# Patient Record
Sex: FEMALE | Race: WHITE | NOT HISPANIC OR LATINO | Employment: FULL TIME | URBAN - METROPOLITAN AREA
[De-identification: names, ages, dates, MRNs, and addresses within clinical notes are randomized per-mention and may not be internally consistent; named-entity substitution may affect disease eponyms.]

---

## 2017-03-08 ENCOUNTER — CONSULT (OUTPATIENT)
Dept: ONCOLOGY | Facility: CLINIC | Age: 42
End: 2017-03-08

## 2017-03-08 VITALS
SYSTOLIC BLOOD PRESSURE: 125 MMHG | HEART RATE: 72 BPM | WEIGHT: 198 LBS | DIASTOLIC BLOOD PRESSURE: 56 MMHG | BODY MASS INDEX: 32.99 KG/M2 | RESPIRATION RATE: 16 BRPM | HEIGHT: 65 IN | TEMPERATURE: 98.1 F

## 2017-03-08 DIAGNOSIS — C50.912 MALIGNANT NEOPLASM OF LEFT FEMALE BREAST, UNSPECIFIED SITE OF BREAST: Primary | ICD-10-CM

## 2017-03-08 PROCEDURE — 99204 OFFICE O/P NEW MOD 45 MIN: CPT | Performed by: INTERNAL MEDICINE

## 2017-03-08 NOTE — PROGRESS NOTES
Subjective     PROBLEM LIST:  1. [id number] invasive ductal carcinoma of the left breast, ER positive CO positive HER-2 negative  A) the patient presented with skin dimpling under the left breast.  A needle localized biopsy was done on 2017.  Pathology showed a 2.2 cm invasive ductal carcinoma.  There were focally positive margins.    CHIEF COMPLAINT: Newly diagnosed breast cancer      HISTORY OF PRESENT ILLNESS:  The patient is a 41 y.o. year old female, referred for evaluation of newly diagnosed breast cancer.  She reports that she noticed a left rib old appearance of the skin under her left breast.  She had had a prior mammogram at 36.  She contacted her doctor who set her up for a repeat mammogram.  This did show an abnormality in the left breast.  An initial biopsy showed a benign lesion but because of concern about the mammogram appearance she was referred for a surgical biopsy.  This was done by Dr. Bauer on 2017.  Her pathology showed a 2.2 cm ER-positive tumor with focally positive margins.    She currently says she's feeling well.  She has a little soreness in the breast but no other concerns.  She is trying to decide if she wants to have a lumpectomy or mastectomy for her cancer treatment.  After talking to friends and family members she is requesting referral to Dr. HARE for her breast cancer surgery.    REVIEW OF SYSTEMS:  A 14 point review of systems was performed and is negative except as noted above.    No past medical history on file.    GYN History: .  Menarche age 14.  Last menstrual period 2014.  She had an endometrial ablation in 2014.    No current outpatient prescriptions on file prior to visit.     No current facility-administered medications on file prior to visit.        No Known Allergies    Past Surgical History   Procedure Laterality Date   • Dilatation and curettage     • Tonsillectomy     • Breast biopsy Left 2017   • Breast lumpectomy Left 2018  "  • Hysteroscopy endometrial ablation tubal sterilization  07/2014   • Lipoma excision  07/2014     Right rib cage       Social History     Social History   • Marital status:      Spouse name: N/A   • Number of children: N/A   • Years of education: N/A     Social History Main Topics   • Smoking status: Former Smoker     Packs/day: 1.00     Years: 10.00     Types: Cigarettes     Quit date: 2004   • Smokeless tobacco: Never Used   • Alcohol use Yes      Comment: occ   • Drug use: No   • Sexual activity: Not Asked     Other Topics Concern   • None     Social History Narrative   • None    she is  and lives with her .  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    Family History   Problem Relation Age of Onset   • Throat cancer Maternal Uncle     no family history of breast or ovarian cancer    Objective     Visit Vitals   • /56   • Pulse 72   • Temp 98.1 °F (36.7 °C) (Temporal Artery )   • Resp 16   • Ht 64.5\" (163.8 cm)   • Wt 198 lb (89.8 kg)   • BMI 33.46 kg/m2     Performance Status: 0  General: well appearing female in no acute distress  Neuro: alert and oriented  HEENT: sclera anicteric, oropharynx clear  Lymphatics: no cervical, supraclavicular, or axillary adenopathy  Rest: There is a incision in the inner lower quadrant of the left breast.  Is healing well.  There is no significant erythema or induration  Cardiovascular: regular rate and rhythm, no murmurs  Lungs: clear to auscultation bilaterally  Abdomen: soft, nontender, nondistended.  No palpable organomegaly  Extremeties: no lower extremity edema  Skin: no rashes, lesions, bruising, or petechiae  Psych: mood and affect appropriate            Assessment/Plan     Mary Ambrosio is a 41 y.o. year old female with new diagnosed stage II ER positive breast cancer.  At this time we do not have any information on lymph nodes and additional surgery will be needed to excise the positive margins and do a sentinel lymph node biopsy.  This " was all explained to her previously by Dr. Bauer.  The patient has decided that she would like to see Dr. Ramsey Arellano for additional surgery, and in particular because she is considering bilateral mastectomy with reconstruction.    We spent some time discussing her surgical options.  I explained to her that in terms of her survival and breast cancer outcome there was no benefit to mastectomy versus lumpectomy followed by radiation.  Many patients will choose mastectomy based on a desire to avoid further mammography and for peace of mind.  We can have a more detailed discussion about adjuvant therapy options once we know the status of her lymph node.  On exam she does not have any palpable adenopathy.  Assuming that her axillary nodes are negative, this would be a stage IIa breast cancer and she would be a good candidate for Oncotype testing.  We did discuss that adjuvant hormonal therapy will be recommended, most likely with tamoxifen.    I will will plan to see her back in mid April.  This appointment can be moved if needed based on the timing of her surgery.           Nancy Caruso MD    3/8/2017

## 2017-03-14 ENCOUNTER — CLINICAL SUPPORT (OUTPATIENT)
Dept: GENETICS | Facility: HOSPITAL | Age: 42
End: 2017-03-14

## 2017-03-14 DIAGNOSIS — C50.919 MALIGNANT NEOPLASM OF FEMALE BREAST, UNSPECIFIED LATERALITY, UNSPECIFIED SITE OF BREAST: Primary | ICD-10-CM

## 2017-03-14 PROCEDURE — 96040: CPT | Performed by: GENETIC COUNSELOR, MS

## 2017-03-14 NOTE — PROGRESS NOTES
Mary Ambrosio is a 41-year old female who was seen for genetic counseling due to a personal history of breast cancer.  Ms. Ambrosio was recently diagnosed with an ER/VA+ invasive ductal breast cancer at age 41.  Ms. Ambrosio retains her uterus and ovaries.  Ms. Ambrosio was interested in discussing her risk for a hereditary cancer syndrome, and decided to pursue genetic testing.   Ms. Ambrosio opted to pursue comprehensive testing via the BreastNext panel ordered through [facility] which includes 17 genes that are known to increase the risk of breast cancer (GILDA, BARD1, BRCA1, BRCA2, BRIP1, CDH1, CHEK2, NBN, NF1, MRE11A, MUTYH, PALB2, PTEN, RAD50, RAD51C, RAD51D, TP53). Results are expected in 2-3 weeks.    FAMILY HISTORY:  Mat. Great-aunt: Pancreatic cancer  Mat. Great-uncle: Prostate cancer, 50s     RISK ASSESSMENT:  Ms. Ambrosio’s personal history of early onset breast cancer raises the question of a hereditary cancer syndrome.  We discussed BRCA1/2 testing as well as the option of pursuing a panel that would test for other genes known to impact breast cancer risk in addition to BRCA1/2.   Ms. Ambrosio clearly meets NCCN guidelines criteria for BRCA1/2 testing based on her age at diagnosis. Based on the family history, the likelihood of a BRCA1/2 mutation is approximately 5%.  This risk assessment is based on the family history information provided at the time of the appointment.  The assessment could change in the future should new information be obtained.    GENETIC COUNSELING (40 minutes): We reviewed the family history information in detail.  Cases of breast cancer follow three general patterns: sporadic, familial, and hereditary.  While most cancer is sporadic, some cases appear to occur in family clusters.  These cases are said to be familial and account for 10-20% of breast cancer cases.  Familial cases may be due to a combination of shared genes and environmental factors among family members.  In even fewer  families, the cancer is said to be inherited, and the genes responsible for the cancer are known.      Family histories typical of hereditary cancer syndromes usually include multiple first- and second-degree relatives diagnosed with cancer types that define a syndrome.  These cases tend to be diagnosed at younger-than-expected ages and can be bilateral or multifocal.  The cancer in these families follows an autosomal dominant inheritance pattern, which indicates the likely presence of a mutation in a cancer susceptibility gene.  Children and siblings of an individual believed to carry this mutation have a 50% chance of inheriting that mutation, thereby inheriting the increased risk to develop cancer.  These mutations can be passed down from the maternal or the paternal lineage.    Hereditary breast cancer accounts for 5-10% of all cases of breast cancer.  A significant proportion of hereditary breast cancer can be attributed to mutations in the BRCA1 and BRCA2 genes.  Mutations in these genes confer an increased risk for breast cancer, ovarian cancer, male breast cancer, prostate cancer and pancreatic cancer.  Women with a BRCA1 or BRCA2 mutation who have already been diagnosed with breast cancer have a 40-60% lifetime risk of a second breast cancer.     There are other genes that are known to be associated with an increased risk for breast cancer.  Some of these genes have well defined cancer risks and established management guidelines.  Other genes that can be tested for have been more recently described, and there may be less data regarding the risks and therefore may not have established  management guidelines.  Based on Ms. Ambrosio’s desire to get as much information as possible regarding her personal risks and potential risks for her family, she opted to pursue testing through a panel that would look at several other genes known to increase the risk for breast cancer.    GENETIC TESTING:  The risks, benefits  and limitations of genetic testing and implications for clinical management following testing were reviewed.  DNA test results can influence decisions regarding screening, prevention and surgical management.  Genetic testing can have significant psychological implications for both individuals and families.  Also discussed was the possibility of employment and insurance discrimination based on genetic test results and the laws in place to prevent this.    We discussed panel testing, which would involve testing for BRCA1/2 as well as several other genes at the same time (other genes include GILDA, BARD1, BRIP1, CDH1, CHEK2, NBN, NF1, MRE11A, MUTYH, PALB2, PTEN, RAD50, RAD51C, RAD51D, TP53). The benefits and limitations of genetic testing were discussed and Ms. Ambrosio decided to   pursue testing via the panel. The implications of a positive or negative test result were discussed. We discussed the possibility that, in some cases, genetic test results may be informative or may be ambiguous due to the identification of a genetic variant. These variants may or may not be associated with an increased cancer risk.  Given her personal history, a negative test result would not eliminate all breast cancer risk to her relatives, although the risk would not be as high as it would with positive genetic testing.        PLAN: Results are expected in 2-3 weeks.  Ms. Ambrosio is welcome to contact us in the meantime at [phone] with any questions she may have.      Jessica Sloan MS, [facility] [place]  Certified Genetic Counselor

## 2017-03-16 ENCOUNTER — DOCUMENTATION (OUTPATIENT)
Dept: OTHER | Facility: HOSPITAL | Age: 42
End: 2017-03-16

## 2017-03-16 NOTE — PROGRESS NOTES
Patient called to ask about oncotype - she wanted to know if it needed to be done before surgery - I explained that it would be ordered by Medical Oncologist if needed after we have the final pathology report - patient verbalized understanding.

## 2017-03-17 ENCOUNTER — DOCUMENTATION (OUTPATIENT)
Dept: OTHER | Facility: HOSPITAL | Age: 42
End: 2017-03-17

## 2017-03-17 NOTE — PROGRESS NOTES
I saw patient with Dr. HARE on 3/15/17. Patient' s  Guido was present. Stage IIA IG IDC. ER/SC positive and HER negative. The patient saw Dr Caruso prior to her surgical consultation.   She has an appointment to see genetics on 3/14/17. Arm measurement completed and given to Sendy for   EMR. Patient consented for decision study. Tumor at outside facility. Educational and supportive materials given and reviewed.

## 2017-03-31 ENCOUNTER — DOCUMENTATION (OUTPATIENT)
Dept: GENETICS | Facility: HOSPITAL | Age: 42
End: 2017-03-31

## 2017-04-11 ENCOUNTER — APPOINTMENT (OUTPATIENT)
Dept: PREADMISSION TESTING | Facility: HOSPITAL | Age: 42
End: 2017-04-11

## 2017-04-11 VITALS — HEIGHT: 66 IN | WEIGHT: 200.84 LBS | BODY MASS INDEX: 32.28 KG/M2

## 2017-04-11 LAB
ALBUMIN SERPL-MCNC: 4.3 G/DL (ref 3.2–4.8)
ALBUMIN/GLOB SERPL: 1.4 G/DL (ref 1.5–2.5)
ALP SERPL-CCNC: 79 U/L (ref 25–100)
ALT SERPL W P-5'-P-CCNC: 31 U/L (ref 7–40)
ANION GAP SERPL CALCULATED.3IONS-SCNC: 3 MMOL/L (ref 3–11)
AST SERPL-CCNC: 29 U/L (ref 0–33)
BILIRUB SERPL-MCNC: 0.7 MG/DL (ref 0.3–1.2)
BUN BLD-MCNC: 12 MG/DL (ref 9–23)
BUN/CREAT SERPL: 17.1 (ref 7–25)
CALCIUM SPEC-SCNC: 9.7 MG/DL (ref 8.7–10.4)
CHLORIDE SERPL-SCNC: 104 MMOL/L (ref 99–109)
CO2 SERPL-SCNC: 32 MMOL/L (ref 20–31)
CREAT BLD-MCNC: 0.7 MG/DL (ref 0.6–1.3)
DEPRECATED RDW RBC AUTO: 43 FL (ref 37–54)
ERYTHROCYTE [DISTWIDTH] IN BLOOD BY AUTOMATED COUNT: 13.3 % (ref 11.3–14.5)
GFR SERPL CREATININE-BSD FRML MDRD: 93 ML/MIN/1.73
GLOBULIN UR ELPH-MCNC: 3 GM/DL
GLUCOSE BLD-MCNC: 94 MG/DL (ref 70–100)
HCT VFR BLD AUTO: 43.2 % (ref 34.5–44)
HGB BLD-MCNC: 14 G/DL (ref 11.5–15.5)
MCH RBC QN AUTO: 28.4 PG (ref 27–31)
MCHC RBC AUTO-ENTMCNC: 32.4 G/DL (ref 32–36)
MCV RBC AUTO: 87.6 FL (ref 80–99)
PLATELET # BLD AUTO: 270 10*3/MM3 (ref 150–450)
PMV BLD AUTO: 9.8 FL (ref 6–12)
POTASSIUM BLD-SCNC: 3.9 MMOL/L (ref 3.5–5.5)
PROT SERPL-MCNC: 7.3 G/DL (ref 5.7–8.2)
RBC # BLD AUTO: 4.93 10*6/MM3 (ref 3.89–5.14)
SODIUM BLD-SCNC: 139 MMOL/L (ref 132–146)
WBC NRBC COR # BLD: 11.47 10*3/MM3 (ref 3.5–10.8)

## 2017-04-11 NOTE — PAT
Gaurav sy measurements:  Length: 21  Width: 15    Contacted doctors office re: orders from Dr. Burnett.  No orders were faxed during patient's visit in [facility].  Please have patient sign consent when orders are received from Dr. Burnett

## 2017-04-12 ENCOUNTER — HOSPITAL ENCOUNTER (OUTPATIENT)
Facility: HOSPITAL | Age: 42
Setting detail: OBSERVATION
Discharge: HOME | End: 2017-04-13
Attending: SURGERY | Admitting: SURGERY

## 2017-04-12 ENCOUNTER — ANESTHESIA EVENT (OUTPATIENT)
Dept: PERIOP | Facility: HOSPITAL | Age: 42
End: 2017-04-12

## 2017-04-12 ENCOUNTER — ANESTHESIA (OUTPATIENT)
Dept: PERIOP | Facility: HOSPITAL | Age: 42
End: 2017-04-12

## 2017-04-12 ENCOUNTER — HOSPITAL ENCOUNTER (OUTPATIENT)
Dept: NUCLEAR MEDICINE | Facility: HOSPITAL | Age: 42
Discharge: HOME | End: 2017-04-12

## 2017-04-12 DIAGNOSIS — C50.912 INVASIVE DUCTAL CARCINOMA OF LEFT BREAST (HCC): ICD-10-CM

## 2017-04-12 DIAGNOSIS — C50.919 BREAST CANCER (HCC): ICD-10-CM

## 2017-04-12 LAB
B-HCG UR QL: NEGATIVE
INTERNAL NEGATIVE CONTROL: NORMAL
INTERNAL POSITIVE CONTROL: REACTIVE
Lab: NORMAL

## 2017-04-12 PROCEDURE — 25010000003 CEFAZOLIN IN DEXTROSE 2-4 GM/100ML-% SOLUTION: Performed by: SURGERY

## 2017-04-12 PROCEDURE — 88331 PATH CONSLTJ SURG 1 BLK 1SPC: CPT | Performed by: SURGERY

## 2017-04-12 PROCEDURE — 38792 RA TRACER ID OF SENTINL NODE: CPT

## 2017-04-12 PROCEDURE — 25010000002 NEOSTIGMINE PER 0.5 MG: Performed by: NURSE ANESTHETIST, CERTIFIED REGISTERED

## 2017-04-12 PROCEDURE — C1789 PROSTHESIS, BREAST, IMP: HCPCS | Performed by: SURGERY

## 2017-04-12 PROCEDURE — 88307 TISSUE EXAM BY PATHOLOGIST: CPT | Performed by: SURGERY

## 2017-04-12 PROCEDURE — 25010000002 ONDANSETRON PER 1 MG: Performed by: NURSE ANESTHETIST, CERTIFIED REGISTERED

## 2017-04-12 PROCEDURE — 25010000002 PROPOFOL 10 MG/ML EMULSION: Performed by: NURSE ANESTHETIST, CERTIFIED REGISTERED

## 2017-04-12 PROCEDURE — 25010000002 DEXAMETHASONE SODIUM PHOSPHATE 10 MG/ML SOLUTION: Performed by: NURSE ANESTHETIST, CERTIFIED REGISTERED

## 2017-04-12 PROCEDURE — 25010000002 DEXAMETHASONE PER 1 MG: Performed by: NURSE ANESTHETIST, CERTIFIED REGISTERED

## 2017-04-12 PROCEDURE — G0378 HOSPITAL OBSERVATION PER HR: HCPCS

## 2017-04-12 PROCEDURE — A9541 TC99M SULFUR COLLOID: HCPCS | Performed by: SURGERY

## 2017-04-12 PROCEDURE — 25010000002 BUPRENORPHINE PER 0.1 MG: Performed by: NURSE ANESTHETIST, CERTIFIED REGISTERED

## 2017-04-12 PROCEDURE — 0 TECHNETIUM FILTERED SULFUR COLLOID: Performed by: SURGERY

## 2017-04-12 DEVICE — IMPLANTABLE DEVICE: Type: IMPLANTABLE DEVICE | Status: FUNCTIONAL

## 2017-04-12 RX ORDER — MAGNESIUM HYDROXIDE 1200 MG/15ML
LIQUID ORAL AS NEEDED
Status: DISCONTINUED | OUTPATIENT
Start: 2017-04-12 | End: 2017-04-12 | Stop reason: HOSPADM

## 2017-04-12 RX ORDER — ONDANSETRON 2 MG/ML
4 INJECTION INTRAMUSCULAR; INTRAVENOUS ONCE AS NEEDED
Status: DISCONTINUED | OUTPATIENT
Start: 2017-04-12 | End: 2017-04-12 | Stop reason: HOSPADM

## 2017-04-12 RX ORDER — PROMETHAZINE HYDROCHLORIDE 25 MG/ML
6.25 INJECTION, SOLUTION INTRAMUSCULAR; INTRAVENOUS EVERY 6 HOURS PRN
Status: DISCONTINUED | OUTPATIENT
Start: 2017-04-12 | End: 2017-04-13 | Stop reason: HOSPADM

## 2017-04-12 RX ORDER — PROMETHAZINE HYDROCHLORIDE 25 MG/ML
6.25 INJECTION, SOLUTION INTRAMUSCULAR; INTRAVENOUS ONCE AS NEEDED
Status: DISCONTINUED | OUTPATIENT
Start: 2017-04-12 | End: 2017-04-12 | Stop reason: HOSPADM

## 2017-04-12 RX ORDER — ACETAMINOPHEN 500 MG
1000 TABLET ORAL ONCE
Status: COMPLETED | OUTPATIENT
Start: 2017-04-12 | End: 2017-04-12

## 2017-04-12 RX ORDER — HYDROMORPHONE HYDROCHLORIDE 1 MG/ML
0.5 INJECTION, SOLUTION INTRAMUSCULAR; INTRAVENOUS; SUBCUTANEOUS
Status: DISCONTINUED | OUTPATIENT
Start: 2017-04-12 | End: 2017-04-12 | Stop reason: HOSPADM

## 2017-04-12 RX ORDER — SODIUM CHLORIDE, SODIUM LACTATE, POTASSIUM CHLORIDE, CALCIUM CHLORIDE 600; 310; 30; 20 MG/100ML; MG/100ML; MG/100ML; MG/100ML
100 INJECTION, SOLUTION INTRAVENOUS CONTINUOUS
Status: DISCONTINUED | OUTPATIENT
Start: 2017-04-12 | End: 2017-04-12 | Stop reason: SDUPTHER

## 2017-04-12 RX ORDER — BUPIVACAINE HYDROCHLORIDE 2.5 MG/ML
INJECTION, SOLUTION EPIDURAL; INFILTRATION; INTRACAUDAL AS NEEDED
Status: DISCONTINUED | OUTPATIENT
Start: 2017-04-12 | End: 2017-04-12 | Stop reason: SURG

## 2017-04-12 RX ORDER — CEFAZOLIN SODIUM 2 G/100ML
2 INJECTION, SOLUTION INTRAVENOUS EVERY 8 HOURS
Status: DISCONTINUED | OUTPATIENT
Start: 2017-04-12 | End: 2017-04-13 | Stop reason: HOSPADM

## 2017-04-12 RX ORDER — ATRACURIUM BESYLATE 10 MG/ML
INJECTION, SOLUTION INTRAVENOUS AS NEEDED
Status: DISCONTINUED | OUTPATIENT
Start: 2017-04-12 | End: 2017-04-12 | Stop reason: SURG

## 2017-04-12 RX ORDER — FENTANYL CITRATE 50 UG/ML
50 INJECTION, SOLUTION INTRAMUSCULAR; INTRAVENOUS
Status: DISCONTINUED | OUTPATIENT
Start: 2017-04-12 | End: 2017-04-12 | Stop reason: HOSPADM

## 2017-04-12 RX ORDER — SCOLOPAMINE TRANSDERMAL SYSTEM 1 MG/1
1 PATCH, EXTENDED RELEASE TRANSDERMAL ONCE
Status: DISCONTINUED | OUTPATIENT
Start: 2017-04-12 | End: 2017-04-12

## 2017-04-12 RX ORDER — FAMOTIDINE 20 MG/1
20 TABLET, FILM COATED ORAL ONCE
Status: COMPLETED | OUTPATIENT
Start: 2017-04-12 | End: 2017-04-12

## 2017-04-12 RX ORDER — SODIUM CHLORIDE 9 MG/ML
INJECTION, SOLUTION INTRAVENOUS CONTINUOUS PRN
Status: DISCONTINUED | OUTPATIENT
Start: 2017-04-12 | End: 2017-04-12 | Stop reason: HOSPADM

## 2017-04-12 RX ORDER — PROMETHAZINE HYDROCHLORIDE 25 MG/1
25 TABLET ORAL ONCE AS NEEDED
Status: DISCONTINUED | OUTPATIENT
Start: 2017-04-12 | End: 2017-04-12 | Stop reason: HOSPADM

## 2017-04-12 RX ORDER — PROMETHAZINE HYDROCHLORIDE 25 MG/1
25 SUPPOSITORY RECTAL ONCE AS NEEDED
Status: DISCONTINUED | OUTPATIENT
Start: 2017-04-12 | End: 2017-04-12 | Stop reason: HOSPADM

## 2017-04-12 RX ORDER — DEXAMETHASONE SODIUM PHOSPHATE 10 MG/ML
INJECTION, SOLUTION INTRAMUSCULAR; INTRAVENOUS AS NEEDED
Status: DISCONTINUED | OUTPATIENT
Start: 2017-04-12 | End: 2017-04-12 | Stop reason: SURG

## 2017-04-12 RX ORDER — SODIUM CHLORIDE, SODIUM LACTATE, POTASSIUM CHLORIDE, CALCIUM CHLORIDE 600; 310; 30; 20 MG/100ML; MG/100ML; MG/100ML; MG/100ML
9 INJECTION, SOLUTION INTRAVENOUS CONTINUOUS
Status: DISCONTINUED | OUTPATIENT
Start: 2017-04-12 | End: 2017-04-12 | Stop reason: SDUPTHER

## 2017-04-12 RX ORDER — ONDANSETRON 2 MG/ML
INJECTION INTRAMUSCULAR; INTRAVENOUS AS NEEDED
Status: DISCONTINUED | OUTPATIENT
Start: 2017-04-12 | End: 2017-04-12 | Stop reason: SURG

## 2017-04-12 RX ORDER — PREGABALIN 75 MG/1
75 CAPSULE ORAL ONCE
Status: COMPLETED | OUTPATIENT
Start: 2017-04-12 | End: 2017-04-12

## 2017-04-12 RX ORDER — SODIUM CHLORIDE 0.9 % (FLUSH) 0.9 %
1-10 SYRINGE (ML) INJECTION AS NEEDED
Status: DISCONTINUED | OUTPATIENT
Start: 2017-04-12 | End: 2017-04-12 | Stop reason: HOSPADM

## 2017-04-12 RX ORDER — DEXAMETHASONE SODIUM PHOSPHATE 4 MG/ML
INJECTION, SOLUTION INTRA-ARTICULAR; INTRALESIONAL; INTRAMUSCULAR; INTRAVENOUS; SOFT TISSUE AS NEEDED
Status: DISCONTINUED | OUTPATIENT
Start: 2017-04-12 | End: 2017-04-12 | Stop reason: SURG

## 2017-04-12 RX ORDER — CELECOXIB 200 MG/1
200 CAPSULE ORAL EVERY 12 HOURS
Status: DISCONTINUED | OUTPATIENT
Start: 2017-04-12 | End: 2017-04-13 | Stop reason: HOSPADM

## 2017-04-12 RX ORDER — ONDANSETRON 2 MG/ML
4 INJECTION INTRAMUSCULAR; INTRAVENOUS EVERY 6 HOURS PRN
Status: DISCONTINUED | OUTPATIENT
Start: 2017-04-12 | End: 2017-04-13 | Stop reason: HOSPADM

## 2017-04-12 RX ORDER — LIDOCAINE HYDROCHLORIDE 10 MG/ML
INJECTION, SOLUTION INFILTRATION; PERINEURAL AS NEEDED
Status: DISCONTINUED | OUTPATIENT
Start: 2017-04-12 | End: 2017-04-12 | Stop reason: SURG

## 2017-04-12 RX ORDER — LIDOCAINE HYDROCHLORIDE 10 MG/ML
1 INJECTION, SOLUTION EPIDURAL; INFILTRATION; INTRACAUDAL; PERINEURAL ONCE
Status: COMPLETED | OUTPATIENT
Start: 2017-04-12 | End: 2017-04-12

## 2017-04-12 RX ORDER — CARISOPRODOL 350 MG/1
350 TABLET ORAL EVERY 8 HOURS PRN
Status: DISCONTINUED | OUTPATIENT
Start: 2017-04-12 | End: 2017-04-13 | Stop reason: HOSPADM

## 2017-04-12 RX ORDER — ACETAMINOPHEN 500 MG
1000 TABLET ORAL EVERY 6 HOURS
Status: DISCONTINUED | OUTPATIENT
Start: 2017-04-13 | End: 2017-04-13 | Stop reason: HOSPADM

## 2017-04-12 RX ORDER — MEPERIDINE HYDROCHLORIDE 25 MG/ML
12.5 INJECTION INTRAMUSCULAR; INTRAVENOUS; SUBCUTANEOUS
Status: DISCONTINUED | OUTPATIENT
Start: 2017-04-12 | End: 2017-04-12 | Stop reason: HOSPADM

## 2017-04-12 RX ORDER — GLYCOPYRROLATE 0.2 MG/ML
INJECTION INTRAMUSCULAR; INTRAVENOUS AS NEEDED
Status: DISCONTINUED | OUTPATIENT
Start: 2017-04-12 | End: 2017-04-12 | Stop reason: SURG

## 2017-04-12 RX ORDER — DIPHENHYDRAMINE HYDROCHLORIDE 50 MG/ML
12.5 INJECTION INTRAMUSCULAR; INTRAVENOUS EVERY 6 HOURS PRN
Status: DISCONTINUED | OUTPATIENT
Start: 2017-04-12 | End: 2017-04-13 | Stop reason: HOSPADM

## 2017-04-12 RX ORDER — BUPRENORPHINE HYDROCHLORIDE 0.32 MG/ML
INJECTION INTRAMUSCULAR; INTRAVENOUS AS NEEDED
Status: DISCONTINUED | OUTPATIENT
Start: 2017-04-12 | End: 2017-04-12 | Stop reason: SURG

## 2017-04-12 RX ORDER — OXYCODONE HYDROCHLORIDE 5 MG/1
10 TABLET ORAL EVERY 4 HOURS PRN
Status: DISCONTINUED | OUTPATIENT
Start: 2017-04-12 | End: 2017-04-13 | Stop reason: HOSPADM

## 2017-04-12 RX ORDER — PROPOFOL 10 MG/ML
VIAL (ML) INTRAVENOUS AS NEEDED
Status: DISCONTINUED | OUTPATIENT
Start: 2017-04-12 | End: 2017-04-12 | Stop reason: SURG

## 2017-04-12 RX ORDER — FAMOTIDINE 10 MG/ML
20 INJECTION, SOLUTION INTRAVENOUS ONCE
Status: CANCELLED | OUTPATIENT
Start: 2017-04-12 | End: 2017-04-12

## 2017-04-12 RX ORDER — CEFAZOLIN SODIUM 2 G/100ML
2 INJECTION, SOLUTION INTRAVENOUS ONCE
Status: COMPLETED | OUTPATIENT
Start: 2017-04-12 | End: 2017-04-12

## 2017-04-12 RX ORDER — CARISOPRODOL 350 MG/1
350 TABLET ORAL 3 TIMES DAILY PRN
Status: DISCONTINUED | OUTPATIENT
Start: 2017-04-12 | End: 2017-04-12 | Stop reason: SDUPTHER

## 2017-04-12 RX ORDER — CELECOXIB 200 MG/1
200 CAPSULE ORAL ONCE
Status: COMPLETED | OUTPATIENT
Start: 2017-04-12 | End: 2017-04-12

## 2017-04-12 RX ORDER — PROPOFOL 10 MG/ML
VIAL (ML) INTRAVENOUS CONTINUOUS PRN
Status: DISCONTINUED | OUTPATIENT
Start: 2017-04-12 | End: 2017-04-12 | Stop reason: SURG

## 2017-04-12 RX ORDER — SODIUM CHLORIDE 9 MG/ML
100 INJECTION, SOLUTION INTRAVENOUS CONTINUOUS
Status: DISCONTINUED | OUTPATIENT
Start: 2017-04-12 | End: 2017-04-13 | Stop reason: HOSPADM

## 2017-04-12 RX ADMIN — CARISOPRODOL 350 MG: 350 TABLET ORAL at 19:29

## 2017-04-12 RX ADMIN — ONDANSETRON 4 MG: 2 INJECTION INTRAMUSCULAR; INTRAVENOUS at 16:58

## 2017-04-12 RX ADMIN — ROBINUL 0.4 MG: 0.2 INJECTION INTRAMUSCULAR; INTRAVENOUS at 17:20

## 2017-04-12 RX ADMIN — BUPIVACAINE HYDROCHLORIDE 30 ML: 2.5 INJECTION, SOLUTION EPIDURAL; INFILTRATION; INTRACAUDAL; PERINEURAL at 13:35

## 2017-04-12 RX ADMIN — CELECOXIB 200 MG: 200 CAPSULE ORAL at 12:07

## 2017-04-12 RX ADMIN — SCOPALAMINE 1 PATCH: 1 PATCH, EXTENDED RELEASE TRANSDERMAL at 11:46

## 2017-04-12 RX ADMIN — DEXAMETHASONE SODIUM PHOSPHATE 2 MG: 10 INJECTION, SOLUTION INTRAMUSCULAR; INTRAVENOUS at 13:40

## 2017-04-12 RX ADMIN — FAMOTIDINE 20 MG: 20 TABLET ORAL at 11:46

## 2017-04-12 RX ADMIN — Medication 3 MG: at 17:20

## 2017-04-12 RX ADMIN — BUPRENORPHINE HYDROCHLORIDE 0.15 MG: 0.3 INJECTION INTRAMUSCULAR; INTRAVENOUS at 13:35

## 2017-04-12 RX ADMIN — ATRACURIUM BESYLATE 50 MG: 10 INJECTION, SOLUTION INTRAVENOUS at 13:32

## 2017-04-12 RX ADMIN — CEFAZOLIN SODIUM 2 G: 2 INJECTION, SOLUTION INTRAVENOUS at 13:26

## 2017-04-12 RX ADMIN — ATRACURIUM BESYLATE 20 MG: 10 INJECTION, SOLUTION INTRAVENOUS at 15:30

## 2017-04-12 RX ADMIN — ACETAMINOPHEN 1000 MG: 500 TABLET ORAL at 11:45

## 2017-04-12 RX ADMIN — DEXAMETHASONE SODIUM PHOSPHATE 8 MG: 4 INJECTION, SOLUTION INTRAMUSCULAR; INTRAVENOUS at 13:45

## 2017-04-12 RX ADMIN — SODIUM CHLORIDE 100 ML/HR: 9 INJECTION, SOLUTION INTRAVENOUS at 18:00

## 2017-04-12 RX ADMIN — Medication 1 DOSE: at 13:26

## 2017-04-12 RX ADMIN — BUPIVACAINE HYDROCHLORIDE 30 ML: 2.5 INJECTION, SOLUTION EPIDURAL; INFILTRATION; INTRACAUDAL; PERINEURAL at 13:40

## 2017-04-12 RX ADMIN — PREGABALIN 75 MG: 75 CAPSULE ORAL at 11:45

## 2017-04-12 RX ADMIN — LIDOCAINE HYDROCHLORIDE 50 MG: 10 INJECTION, SOLUTION INFILTRATION; PERINEURAL at 13:32

## 2017-04-12 RX ADMIN — DEXAMETHASONE SODIUM PHOSPHATE 2 MG: 10 INJECTION, SOLUTION INTRAMUSCULAR; INTRAVENOUS at 13:35

## 2017-04-12 RX ADMIN — CEFAZOLIN SODIUM 2 G: 2 INJECTION, SOLUTION INTRAVENOUS at 21:49

## 2017-04-12 RX ADMIN — LIDOCAINE HYDROCHLORIDE 1 ML: 10 INJECTION, SOLUTION EPIDURAL; INFILTRATION; INTRACAUDAL; PERINEURAL at 11:46

## 2017-04-12 RX ADMIN — SODIUM CHLORIDE, POTASSIUM CHLORIDE, SODIUM LACTATE AND CALCIUM CHLORIDE: 600; 310; 30; 20 INJECTION, SOLUTION INTRAVENOUS at 15:45

## 2017-04-12 RX ADMIN — CELECOXIB 200 MG: 200 CAPSULE ORAL at 21:49

## 2017-04-12 RX ADMIN — PROPOFOL 200 MCG/KG/MIN: 10 INJECTION, EMULSION INTRAVENOUS at 13:32

## 2017-04-12 RX ADMIN — PROPOFOL 200 MG: 10 INJECTION, EMULSION INTRAVENOUS at 13:32

## 2017-04-12 RX ADMIN — BUPRENORPHINE HYDROCHLORIDE 0.15 MG: 0.3 INJECTION INTRAMUSCULAR; INTRAVENOUS at 13:40

## 2017-04-12 RX ADMIN — OXYCODONE HYDROCHLORIDE 10 MG: 5 TABLET ORAL at 19:29

## 2017-04-12 RX ADMIN — SODIUM CHLORIDE, POTASSIUM CHLORIDE, SODIUM LACTATE AND CALCIUM CHLORIDE 9 ML/HR: 600; 310; 30; 20 INJECTION, SOLUTION INTRAVENOUS at 11:46

## 2017-04-12 RX ADMIN — SODIUM CHLORIDE, POTASSIUM CHLORIDE, SODIUM LACTATE AND CALCIUM CHLORIDE 100 ML/HR: 600; 310; 30; 20 INJECTION, SOLUTION INTRAVENOUS at 18:39

## 2017-04-12 NOTE — BRIEF OP NOTE
BREAST MASTECTOMY WITH SENTINEL NODE BIOPSY AND AXILLARY NODE DISSECTION  Procedure Note    Mary VINOD Daily  4/12/2017    Pre-op Diagnosis:   Left breast cancer    Post-op Diagnosis:     Post-Op Diagnosis Codes:     * Cancer of overlapping sites of left female breast [C50.812]    Procedure/CPT® Codes:      Procedure(s):  LEFT BREAST MASTECTOMY WITH LEFT SENTINEL NODE BIOPSY AND RIGHT PROPHYLATIC MASTECTOMY  BILATERAL IMMED STAGED BREAST RECONSTRUCTION WITH EXPANDERS AND ALLODERM    Surgeon(s):  MD Kym Petty MD    Anesthesia: General    Staff:   Circulator: Jamie Vance RN  Scrub Person: Devonte Villasenor; Amy Chaidez  Nursing Assistant: Rama Saha CNA  Assistant: SIVA Carlson; Aniya Arcos PA-C    Estimated Blood Loss: * No values recorded between 4/12/2017 12:00 AM and 4/12/2017  3:23 PM *  Urine Voided: 125 mL    Specimens:                  ID Type Source Tests Collected by Time Destination   A :  Tissue Breast, Right TISSUE EXAM Patrice Almonte MD 4/12/2017 1425    B :  Tissue [place] Lymph Node TISSUE EXAM Patrice Almonte MD 4/12/2017 1459    C : Silk tie on the lateral access of breast, 1.08kg   Tissue Breast, Left TISSUE EXAM Patrice Almonte MD 4/12/2017 1519          Drains:   Urethral Catheter 04/12/17 1340 100% silicone 16 (Active)           Findings: SLN negative    Complications: none      Patrice Almonte MD     Date: 4/12/2017  Time: 3:23 PM

## 2017-04-12 NOTE — ANESTHESIA PROCEDURE NOTES
Airway  Urgency: elective    Date/Time: 4/12/2017 1:29 PM  End Time:4/12/2017 1:33 PM  Airway not difficult    General Information and Staff    Patient location during procedure: OR  Anesthesiologist: BOBBY OHARA  CRNA: ERICKSON HOUSER    Indications and Patient Condition  Indications for airway management: airway protection    Preoxygenated: yes  MILS not maintained throughout  Mask difficulty assessment: 1 - vent by mask    Final Airway Details  Final airway type: endotracheal airway      Successful airway: ETT  Cuffed: yes   Successful intubation technique: direct laryngoscopy  Endotracheal tube insertion site: oral  Blade: Olvin  Blade size: #3  ETT size: 7.0 mm  Cormack-Lehane Classification: grade I - full view of glottis  Placement verified by: chest auscultation and capnometry   Inital cuff pressure (cm H2O): 15  Cuff volume (mL): 6  Measured from: lips  ETT to lips (cm): 21  Number of attempts at approach: 1    Additional Comments  Negative epigastric sounds, Breath sound equal bilaterally with symmetric chest rise and fall

## 2017-04-12 NOTE — ANESTHESIA POSTPROCEDURE EVALUATION
Patient: Mary Ambrosio    Procedure Summary     Date Anesthesia Start Anesthesia Stop Room / Location    04/12/17 1016 [id number]  [facility] / [facility]       Procedure Diagnosis Surgeon Provider    LEFT BREAST MASTECTOMY WITH LEFT SENTINEL NODE BIOPSY AND POSS AXILLARY NODE DISSECTION, RIGHT PROPHYLATIC MASTECTOMY (Bilateral Breast); BILATERAL IMMED STAGED BREAST RECONSTRUCTION WITH EXPANDERS AND ALLODERM (Bilateral Breast) Cancer of overlapping sites of left female breast Patrice Almonte MD; MD Dean Monson MD          Anesthesia Type: general  Last vitals  BP      Temp      Pulse     Resp      SpO2        Post Anesthesia Care and Evaluation    Patient location during evaluation: PACU  Patient participation: complete - patient participated  Level of consciousness: sleepy but conscious  Pain score: 0  Pain management: adequate  Airway patency: patent  Anesthetic complications: No anesthetic complications  PONV Status: none  Cardiovascular status: hemodynamically stable and acceptable  Respiratory status: nonlabored ventilation, acceptable and nasal cannula  Hydration status: acceptable

## 2017-04-12 NOTE — OP NOTE
Date of Procedure: 4-12-17  Preoperative Diagnosis: acquired absence bilateral breasts, breast cancer (c50.812)  Postoperative Diagnosis: same  Procedure Performed: immediate staged bilateral breast reconstruction with expanders and alloderm  Surgeon: Kym Burnett MD  Assistant: SIVA Nunez  EBL: less than 10ml  Complications: none  HPI: the patient is a 41 year old with breast cancer who underwent bilateral mastectomies today and is undergoing immediate staged bilateral breast reconstruction with expanders and alloderm. The risks benefits and all alternatives to the procedure were discussed at length with the patient who has verbalized full understanding and wishes to proceed.  Description of the procedure/findings: The patient underwent bilateral mastectomies today and following this, I scrubbed into the case and the area was redraped over the existing drapes. We then started on the left side and created a subpectoral pocket. Meticulous hemostasis was maintained throughout. The pocket was irrigated with antibiotic irrigation. An expander was placed in this pocket and alloderm was placed and sewn in place to cover the lower half of the expander. It was sewn in with monocryl to the lower border of the pectoralis, laterally to the serratus and inferiorly to the inframammary fold. The subpectoral pocket was closed over a 10 oni drain and a 15 oni drain was placed in the subcutaneous pocket. The drains were sutured to the skin with 2-0 silk drain sutures and connected to bulbs. The incision was closed with deep dermal and soft tissue 3-0 vicryl and a running subcuticular 4-0 monocryl. In identical fashion, right sided breast reconstruction was performed. The areas were cleaned, dried, and dressed with skin affix and steristrips. She was placed in a postsurgical bra and was doing well throughout and at the completion of the procedure. There were no complications from the procedure.  All needle, sponge and  instrument counts were correct at the end of the procedure.

## 2017-04-12 NOTE — ANESTHESIA PROCEDURE NOTES
Peripheral Block    Patient location during procedure: OR  Start time: 4/12/2017 1:31 PM  Stop time: 4/12/2017 1:41 PM  Reason for block: at surgeon's request and post-op pain management  Performed by  Anesthesiologist: BOBBY OHARA  Preanesthetic Checklist  Completed: patient identified, site marked, surgical consent, pre-op evaluation, timeout performed, IV checked, risks and benefits discussed and monitors and equipment checked  Peripheral Block Prep:  Sterile barriers:cap, gloves, gown and mask  Prep: ChloraPrep  Patient monitoring: blood pressure monitoring, continuous pulse oximetry and EKG  Peripheral Procedure  Nursing cardiac assessment comments yes: General Anesthesia.  Guidance:ultrasound guided and landmark technique  Images:still images obtained    Laterality:Bilateral  Block Type:PECS I and PECS II (PECS)  Injection Technique:single-shot  Needle Type:short-bevel  Needle Gauge:20 G    Medications  Preservative Free Saline:10ml  Comment:Block Injection:  Total volume of LA divided between Right and Left sided blocks       Adjuncts:   Buprenorphine 0.30 mcg ,Decadron 4 mg PSF  Local Injected:bupivacaine 0.25% without epinephrine Local Amount Injected:60mL  Post Assessment  Injection Assessment: negative aspiration for heme and incremental injection  Patient Tolerance:comfortable throughout block  Complications:no  Additional Notes  The pt. Was placed in the Supine Position and was anesthetized per  General Anesthesia .     The insertion site was prepped with CHG and Ultrasound guidance with In-Plane techniquewas  a 4inch BBraun 360 degree echogenic needle was visualized.  Normal Saline PSF was  utilized for hydrodissection of tissue. PECS 1 Block- Pectoralis Major and Minor where identified and LA was injected between PMM and PmM at the level of the 3rd Rib(10ml),  PECS 2-  Pectoralis Minor and Serratus muscle where identified and the needle was advanced laterally in-plane with the 4th rib as a  backstop, pleura was monitored.  LA was injected between SA and PmM at the level of 4th rib( 20ml).  LA injection spread was visualized, injection was incremental 1-5ml, normal or low injection pressure, no intravascular injection, no pneumothorax appreciated.  Thank You.

## 2017-04-12 NOTE — INTERVAL H&P NOTE
"[facility] Pre-op    Full history and physical note from office is up to date.  See office note attached.    /63 (BP Location: Right arm, Patient Position: Lying)  Pulse 69  Temp 98.8 °F (37.1 °C) (Temporal Artery )   Resp 16  Ht 66\" (167.6 cm)  Wt 200 lb 13.4 oz (91.1 kg)  SpO2 97%  BMI 32.42 kg/m2    IMM:  Influenza:  Yes 2016  Pneumococcal:  Yes < 5 years  Tetanus:  unknown    Cancer Staging (if applicable)  Cancer Patient: __ yes __no __unknown__N/A; If yes, clinical stage T:__ N:__M:__, stage group or __N/A    Lashawn Almazan, APRN 4/12/2017 12:07 PM    "

## 2017-04-12 NOTE — ANESTHESIA PREPROCEDURE EVALUATION
Anesthesia Evaluation     Patient summary reviewed and Nursing notes reviewed   no history of anesthetic complications:  NPO Status: > 8 hours   Airway   Mallampati: II  TM distance: >3 FB  Neck ROM: full  no difficulty expected  Dental      Pulmonary - negative pulmonary ROS and normal exam   Cardiovascular - negative cardio ROS and normal exam        Neuro/Psych- negative ROS  GI/Hepatic/Renal/Endo - negative ROS     Musculoskeletal (-) negative ROS    Abdominal    Substance History - negative use     OB/GYN negative ob/gyn ROS         Other          Other Comment: brast cancer                            Anesthesia Plan    ASA 2     general   (Pecs  )  intravenous induction   Anesthetic plan and risks discussed with patient.    Plan discussed with CRNA.

## 2017-04-13 VITALS
BODY MASS INDEX: 32.28 KG/M2 | DIASTOLIC BLOOD PRESSURE: 53 MMHG | HEIGHT: 66 IN | RESPIRATION RATE: 16 BRPM | HEART RATE: 62 BPM | OXYGEN SATURATION: 95 % | TEMPERATURE: 98.1 F | SYSTOLIC BLOOD PRESSURE: 109 MMHG | WEIGHT: 200.84 LBS

## 2017-04-13 PROCEDURE — G0378 HOSPITAL OBSERVATION PER HR: HCPCS

## 2017-04-13 RX ORDER — CARISOPRODOL 350 MG/1
350 TABLET ORAL EVERY 8 HOURS PRN
Qty: 20 TABLET | Refills: 0 | Status: SHIPPED | OUTPATIENT
Start: 2017-04-13 | End: 2017-04-22

## 2017-04-13 RX ORDER — OXYCODONE HYDROCHLORIDE 10 MG/1
5 TABLET ORAL EVERY 4 HOURS PRN
Qty: 20 TABLET | Refills: 0 | Status: SHIPPED | OUTPATIENT
Start: 2017-04-13 | End: 2017-04-22

## 2017-04-13 RX ORDER — CELECOXIB 200 MG/1
200 CAPSULE ORAL EVERY 12 HOURS
Qty: 8 CAPSULE | Refills: 0 | Status: SHIPPED | OUTPATIENT
Start: 2017-04-13 | End: 2017-04-18

## 2017-04-13 RX ORDER — CEPHALEXIN 500 MG/1
500 CAPSULE ORAL 3 TIMES DAILY
Qty: 21 CAPSULE | Refills: 0 | Status: SHIPPED | OUTPATIENT
Start: 2017-04-13 | End: 2017-04-20

## 2017-04-13 RX ADMIN — ACETAMINOPHEN 1000 MG: 500 TABLET, FILM COATED ORAL at 12:07

## 2017-04-13 RX ADMIN — CARISOPRODOL 350 MG: 350 TABLET ORAL at 03:45

## 2017-04-13 RX ADMIN — SODIUM CHLORIDE 100 ML/HR: 9 INJECTION, SOLUTION INTRAVENOUS at 05:08

## 2017-04-13 RX ADMIN — OXYCODONE HYDROCHLORIDE 10 MG: 5 TABLET ORAL at 12:10

## 2017-04-13 RX ADMIN — OXYCODONE HYDROCHLORIDE 10 MG: 5 TABLET ORAL at 08:15

## 2017-04-13 RX ADMIN — ACETAMINOPHEN 1000 MG: 500 TABLET, FILM COATED ORAL at 00:02

## 2017-04-13 RX ADMIN — CELECOXIB 200 MG: 200 CAPSULE ORAL at 08:15

## 2017-04-13 NOTE — PROGRESS NOTES
"Mary Ambrosio  1975  [id number]    Surgery Progress Note    Date of visit: 4/13/2017    Subjective:minimal pain  No narcotics    Objective:    /56  Pulse 64  Temp 97.8 °F (36.6 °C)  Resp 16  Ht 66\" (167.6 cm)  Wt 200 lb 13.4 oz (91.1 kg)  SpO2 98%  BMI 32.42 kg/m2    Intake/Output Summary (Last 24 hours) at 04/13/17 0728  Last data filed at 04/13/17 0508   Gross per 24 hour   Intake          3763.33 ml   Output             4525 ml   Net          -761.67 ml       CV: Regular rate and rhythm  L: normal air entry  BREAST: flaps viable. MINA drainage adequate        LABS:      Results from last 7 days  Lab Units 04/11/17  1234   WBC 10*3/mm3 11.47*   HEMOGLOBIN g/dL 14.0   HEMATOCRIT % 43.2   PLATELETS 10*3/mm3 270       Results from last 7 days  Lab Units 04/11/17  1234   SODIUM mmol/L 139   POTASSIUM mmol/L 3.9   CHLORIDE mmol/L 104   TOTAL CO2 mmol/L 32.0*   BUN mg/dL 12   CREATININE mg/dL 0.70   CALCIUM mg/dL 9.7   BILIRUBIN mg/dL 0.7   ALK PHOS U/L 79   ALT (SGPT) U/L 31   AST (SGOT) U/L 29   GLUCOSE mg/dL 94       Results from last 7 days  Lab Units 04/11/17  1234   SODIUM mmol/L 139   POTASSIUM mmol/L 3.9   CHLORIDE mmol/L 104   TOTAL CO2 mmol/L 32.0*   BUN mg/dL 12   CREATININE mg/dL 0.70   GLUCOSE mg/dL 94   CALCIUM mg/dL 9.7     No results found for: LIPASE      Assessment/ Plan: stable post-op course  Increase activity  DC home  F/U in one week    Problem List Items Addressed This Visit     Breast cancer    Relevant Orders    Tissue Exam (Completed)            Patrice Almonte MD  4/13/2017  7:28 AM    "

## 2017-04-13 NOTE — PROGRESS NOTES
Discharge Planning Assessment  [facility]     Patient Name: Mary Ambrosio  MRN: [id number]  Today's Date: 4/13/2017    Admit Date: 4/12/2017          Discharge Needs Assessment       04/13/17 1040    Living Environment    Lives With spouse;child([place]), dependent    Living Arrangements house    Provides Primary Care For child([place])    Primary Care Provided By spouse/significant other    Quality Of Family Relationships supportive    Able to Return to Prior Living Arrangements yes    Discharge Needs Assessment    Concerns To Be Addressed adjustment to diagnosis/illness concerns;grief and loss concerns;no discharge needs identified;basic needs concerns;coping/stress concerns    Readmission Within The Last 30 Days no previous admission in last 30 days    Anticipated Changes Related to Illness other (see comments)   Post surgical recuperation    Equipment Currently Used at Home none    Equipment Needed After Discharge none    Transportation Available car    Discharge Disposition home or self-care    Discharge Contact Information if Applicable [phone]            Discharge Plan       04/13/17 1041    Case Management/Social Work Plan    Plan Home    Patient/Family In Agreement With Plan yes    Additional Comments Anticipates discharge to home with no discharge planning needs identified.         Discharge Placement     No information found        Expected Discharge Date and Time     Expected Discharge Date Expected Discharge Time    Apr 13, 2017               Demographic Summary       04/13/17 1035    Referral Information    Admission Type outpatient in a bed    Referral Source admission list    Record Reviewed medical record    Contact Information    Permission Granted to Share Information With     Primary Care Physician Information    Name Souleymanechris Guevara            Functional Status       04/13/17 1036    Functional Status Current    Ambulation 0-->independent    Transferring 0-->independent     Toileting 0-->independent    Bathing 0-->independent    Dressing 0-->independent    Eating 0-->independent    Communication 0-->understands/communicates without difficulty    Change in Functional Status Since Onset of Current Illness/Injury no    Functional Status Prior    Ambulation 0-->independent    Transferring 0-->independent    Toileting 0-->independent    Bathing 0-->independent    Dressing 0-->independent    Eating 0-->independent    Communication 0-->understands/communicates without difficulty    IADL    Medications independent    Meal Preparation assistive person   Tells me her spouse helps with household chores    Housekeeping assistive person    Laundry assistive person    Shopping independent    Oral Care independent    Activity Tolerance    Current Activity Limitations other (see comments)   Post surgical restrictions    Usual Activity Tolerance excellent    Current Activity Tolerance excellent    Employment/Financial    Employment/Finance Comments Tells me she has [employer] insurance, no concerns with medications coverage            Psychosocial     None            Abuse/Neglect     None            Legal     None            Substance Abuse     None            Patient Forms     None          July Fofana RN

## 2017-04-13 NOTE — PLAN OF CARE
Problem: Patient Care Overview (Adult)  Goal: Plan of Care Review  Outcome: Ongoing (interventions implemented as appropriate)    04/13/17 0428   Coping/Psychosocial Response Interventions   Plan Of Care Reviewed With patient   Patient Care Overview   Progress progress toward functional goals as expected   Outcome Evaluation   Outcome Summary/Follow up Plan minimal pain, rested well, lopez intact, good uop.         04/13/17 0428   Coping/Psychosocial Response Interventions   Plan Of Care Reviewed With patient   Patient Care Overview   Progress progress toward functional goals as expected   Outcome Evaluation   Outcome Summary/Follow up Plan minimal pain, rested well, lopez intact, good uop.       Goal: Adult Individualization and Mutuality  Outcome: Ongoing (interventions implemented as appropriate)  Goal: Discharge Needs Assessment  Outcome: Ongoing (interventions implemented as appropriate)    Problem: Mastectomy (Adult)  Goal: Signs and Symptoms of Listed Potential Problems Will be Absent or Manageable (Mastectomy)  Outcome: Ongoing (interventions implemented as appropriate)

## 2017-04-13 NOTE — PROGRESS NOTES
"The patient reports soreness, but says she is overall feeling well.  /53 (BP Location: Right leg, Patient Position: Lying)  Pulse 62  Temp 98.1 °F (36.7 °C) (Oral)   Resp 16  Ht 66\" (167.6 cm)  Wt 200 lb 13.4 oz (91.1 kg)  SpO2 95%  BMI 32.42 kg/m2  Bilaateral breast skin viable, incisions are intact with steristrips and drains look mostly serous.  Plan for discharge home this morning.     "

## 2017-04-13 NOTE — OP NOTE
DATE OF PROCEDURE:  04/12/2017    PREOPERATIVE DIAGNOSIS: Left breast invasive cancer at the 9 oclock position.     POSTOPERATIVE DIAGNOSIS: Left breast invasive cancer at the 9 oclock position.     PROCEDURES PERFORMED:  1. Left sentinel lymph node injection.   2. Left skin sparing mastectomy.   3. Left sentinel lymph node biopsy.   4. Prophylactic right skin sparing mastectomy.     ANESTHESIA: General.     SURGEON: Patrice Almonte MD    ASSISTANT: Cj Burnett PA-C    FINDINGS: The patient had 1 left sentinel lymph node that was negative for metastases.     ESTIMATED BLOOD LOSS: Very minimal.     INDICATIONS: The patient is a pleasant 41-year-old lady who presented with abnormal finding on mammogram and ultrasound in the left breast at the 9 oclock position with a mass that measured about 2.2 cm that prompted excisional biopsy that showed intermediate grade invasive ductal carcinoma with positive margins. She presents today for the above procedure with plan to proceed with immediate reconstruction by Dr. Burnett.     DESCRIPTION OF PROCEDURE: The patient was taken to the operating room by anesthesia and placed supine on the table. Following induction of general endotracheal anesthesia, TEDs and SCDs were placed. A Leach catheter was placed. She received 2 g of Kefzol IV. Then, 530 mCi of radioactive technetium was injected in the left subareolar region. Anesthesia placed pectoralis nerve block using ultrasound guidance bilaterally. The breasts, chest, axilla and upper arms were then prepped and draped in a sterile fashion. A timeout was observed. We proceeded with prophylactic right skin sparing mastectomy first which was done via transverse elliptical incision encompassing the areolar nipple complex. Superior and inferior flaps were raised dissecting the breast tissue away from the flaps down toward the muscle that was exposed maintaining adequate thickness of the flaps. We then proceeded by removing the breast  off the underlying pectoralis major muscle taking the fascia along with the specimen, starting medially and proceeding laterally with no difficulty. Some of the larger vessels of the breast were ligated with 3-0 silk suture. The breast was marked with a silk suture laterally, removed as a whole and sent fresh to pathology. The wound was inspected and was noted to be under adequate hemostasis. It was well irrigated with water with clear return of fluid noted. We then proceeded with the left skin sparing mastectomy, which was done in a similar fashion. Note, the elliptical incision encompassed the scar that she had at the medial aspect of the breast. Flaps were also raised dissecting the breast tissue away from the flaps down to where the muscle was exposed. Once in the axilla, we identified a large node that was consistent with a sentinel lymph node that had a very high radioactive count with no other palpable nodes or radioactivity noted in the axilla. This was removed as a whole and there was only one node in the specimen that was noted to be negative for metastases on frozen section. The breast also was removed as a whole, tacked with a silk suture laterally, and sent fresh to pathology. The wound was irrigated with water with clear return of fluid noted. The flaps were viable. At this time, Dr. Burnett proceeded with immediate reconstruction. The patient was doing relatively well with anesthesia. Sponge count and needle count were correct at the end of my procedure.       MD YELITZA Barrera/rory  DD: 04/12/2017 18:43:01  DT: 04/12/2017 20:01:31  Voice Rec. ID #[id number]  Voice Original ID #[id number]  Doc ID #[id number]  Rev. #0  cc:

## 2017-04-14 LAB
CYTO UR: NORMAL
LAB AP CASE REPORT: NORMAL
LAB AP CLINICAL INFORMATION: NORMAL
LAB AP DIAGNOSIS COMMENT: NORMAL
Lab: NORMAL
Lab: NORMAL
PATH REPORT.FINAL DX SPEC: NORMAL
PATH REPORT.GROSS SPEC: NORMAL

## 2017-04-18 ENCOUNTER — OFFICE VISIT (OUTPATIENT)
Dept: ONCOLOGY | Facility: CLINIC | Age: 42
End: 2017-04-18

## 2017-04-18 VITALS
HEART RATE: 75 BPM | TEMPERATURE: 98.8 F | HEIGHT: 66 IN | WEIGHT: 202 LBS | BODY MASS INDEX: 32.47 KG/M2 | DIASTOLIC BLOOD PRESSURE: 53 MMHG | SYSTOLIC BLOOD PRESSURE: 117 MMHG | RESPIRATION RATE: 16 BRPM

## 2017-04-18 DIAGNOSIS — C50.912 MALIGNANT NEOPLASM OF LEFT FEMALE BREAST, UNSPECIFIED SITE OF BREAST: Primary | ICD-10-CM

## 2017-04-18 PROCEDURE — 99214 OFFICE O/P EST MOD 30 MIN: CPT | Performed by: INTERNAL MEDICINE

## 2017-04-18 RX ORDER — CYCLOBENZAPRINE HCL 5 MG
TABLET ORAL
COMMUNITY
Start: 2017-04-13 | End: 2017-12-12

## 2017-04-18 RX ORDER — HYDROCODONE BITARTRATE AND ACETAMINOPHEN 5; 325 MG/1; MG/1
TABLET ORAL
COMMUNITY
Start: 2017-02-08 | End: 2017-04-18

## 2017-04-18 NOTE — PROGRESS NOTES
"      PROBLEM LIST:  1. [id number] invasive ductal carcinoma of the left breast, ER positive [place] positive HER-2 negative  A) the patient presented with skin dimpling under the left breast. A needle localized biopsy was done on 2/8/2017. Pathology showed a 2.2 cm invasive ductal carcinoma. There were focally positive margins.   Bilateral mastectomy done on 4/12/17.  Pathology showed residual in situ DICIS.  0/1 SLN involved.  [id number] (Stage IIA)    Subjective     HISTORY OF PRESENT ILLNESS:   Mary Ambrosio returns for follow-up.   She had bilateral mastectomy last week.  She still has some burning pain on the sides of her torso but this is getting better.  She has not had a bowel movement since surgery.    Past Medical History, Past Surgical History, Social History, Family History have been reviewed and are without significant changes except as mentioned.    Review of Systems   A comprehensive 14 point review of systems was performed and was negative except as mentioned.    Medications:  The current medication list was reviewed in the EMR    ALLERGIES:    Allergies   Allergen Reactions   • Tegaderm Ag Mesh [Silver] Rash       Objective      /53  Pulse 75  Temp 98.8 °F (37.1 °C) (Temporal Artery )   Resp 16  Ht 66\" (167.6 cm)  Wt 202 lb (91.6 kg)  BMI 32.6 kg/m2     Performance Status: 0    General: well appearing female in no acute distress  Neuro: alert and oriented  HEENT: sclera anicteric, oropharynx clear  Lymphatics: no cervical, supraclavicular, or axillary adenopathy  Cardiovascular: regular rate and rhythm, no murmurs  Lungs: clear to auscultation bilaterally  Abdomen: soft, nontender, nondistended.  No palpable organomegaly  Extremeties: no lower extremity edema  Skin: no rashes, lesions, bruising, or petechiae  Psych: mood and affect appropriate          Assessment/Plan   Mary Ambrosio is a 41 y.o. year old female with stage II a ER positive ME positive HER-2 negative invasive ductal " carcinoma of the left breast.  She returns today about a week after her bilateral mastectomy.  Her surgery did show some residual DCIS but no more invasive carcinoma.  Her lymph node was uninvolved.  We reviewed her pathology report and staging.  I recommend that we do an Oncotype test which we discussed in detail at her last visit.  With a high risk recurrence score, I would recommend adjuvant chemotherapy, likely with Taxotere and cyclophosphamide.  Regardless of the Oncotype score, I would recommend at least 5 years of adjuvant hormonal therapy.  We will send Oncotype test today and I will see her back in 2 weeks to review the results.    She is constipated, secondary to her surgery and pain medication use.  I recommended she start taking MiraLAX once daily.                  Visit time was 25 minutes, greater than 50% spent in counseling      Nancy Caruso MD  [facility]    4/18/2017          CC:

## 2017-05-02 ENCOUNTER — OFFICE VISIT (OUTPATIENT)
Dept: ONCOLOGY | Facility: CLINIC | Age: 42
End: 2017-05-02

## 2017-05-02 ENCOUNTER — EDUCATION (OUTPATIENT)
Dept: CARDIOLOGY | Facility: HOSPITAL | Age: 42
End: 2017-05-02

## 2017-05-02 VITALS
WEIGHT: 205 LBS | HEIGHT: 66 IN | DIASTOLIC BLOOD PRESSURE: 80 MMHG | BODY MASS INDEX: 32.95 KG/M2 | TEMPERATURE: 97.6 F | HEART RATE: 72 BPM | RESPIRATION RATE: 16 BRPM | SYSTOLIC BLOOD PRESSURE: 130 MMHG

## 2017-05-02 DIAGNOSIS — C50.919 MALIGNANT NEOPLASM OF FEMALE BREAST, UNSPECIFIED LATERALITY, UNSPECIFIED SITE OF BREAST: Primary | ICD-10-CM

## 2017-05-02 DIAGNOSIS — C50.912 MALIGNANT NEOPLASM OF LEFT FEMALE BREAST, UNSPECIFIED SITE OF BREAST: ICD-10-CM

## 2017-05-02 DIAGNOSIS — L65.9 ALOPECIA: ICD-10-CM

## 2017-05-02 DIAGNOSIS — C50.912 MALIGNANT NEOPLASM OF LEFT FEMALE BREAST, UNSPECIFIED SITE OF BREAST: Primary | ICD-10-CM

## 2017-05-02 PROCEDURE — 99214 OFFICE O/P EST MOD 30 MIN: CPT | Performed by: INTERNAL MEDICINE

## 2017-05-02 RX ORDER — PROCHLORPERAZINE MALEATE 10 MG
10 TABLET ORAL EVERY 6 HOURS PRN
Qty: 30 TABLET | Refills: 5 | Status: SHIPPED | OUTPATIENT
Start: 2017-05-02 | End: 2017-10-03

## 2017-05-02 RX ORDER — ONDANSETRON HYDROCHLORIDE 8 MG/1
8 TABLET, FILM COATED ORAL 3 TIMES DAILY PRN
Qty: 30 TABLET | Refills: 5 | Status: SHIPPED | OUTPATIENT
Start: 2017-05-02 | End: 2017-10-03

## 2017-05-02 RX ORDER — DEXAMETHASONE 4 MG/1
TABLET ORAL
Qty: 12 TABLET | Refills: 3 | Status: SHIPPED | OUTPATIENT
Start: 2017-05-02 | End: 2017-07-12 | Stop reason: SDUPTHER

## 2017-05-02 RX ORDER — TRAMADOL HYDROCHLORIDE 50 MG/1
50 TABLET ORAL EVERY 6 HOURS PRN
COMMUNITY
Start: 2017-04-25 | End: 2017-08-02

## 2017-05-09 ENCOUNTER — DOCUMENTATION (OUTPATIENT)
Dept: SOCIAL WORK | Facility: HOSPITAL | Age: 42
End: 2017-05-09

## 2017-05-09 ENCOUNTER — DOCUMENTATION (OUTPATIENT)
Dept: NUTRITION | Facility: HOSPITAL | Age: 42
End: 2017-05-09

## 2017-05-09 ENCOUNTER — INFUSION (OUTPATIENT)
Dept: ONCOLOGY | Facility: HOSPITAL | Age: 42
End: 2017-05-09

## 2017-05-09 ENCOUNTER — OFFICE VISIT (OUTPATIENT)
Dept: ONCOLOGY | Facility: CLINIC | Age: 42
End: 2017-05-09

## 2017-05-09 ENCOUNTER — EDUCATION (OUTPATIENT)
Dept: ONCOLOGY | Facility: HOSPITAL | Age: 42
End: 2017-05-09

## 2017-05-09 VITALS
TEMPERATURE: 97.7 F | WEIGHT: 200.8 LBS | HEART RATE: 72 BPM | HEIGHT: 66 IN | SYSTOLIC BLOOD PRESSURE: 132 MMHG | DIASTOLIC BLOOD PRESSURE: 66 MMHG | RESPIRATION RATE: 16 BRPM | BODY MASS INDEX: 32.27 KG/M2

## 2017-05-09 DIAGNOSIS — C50.912 MALIGNANT NEOPLASM OF LEFT FEMALE BREAST, UNSPECIFIED SITE OF BREAST: Primary | ICD-10-CM

## 2017-05-09 PROBLEM — C50.919 BREAST CANCER (HCC): Status: RESOLVED | Noted: 2017-04-12 | Resolved: 2017-05-09

## 2017-05-09 LAB
ALBUMIN SERPL-MCNC: 4.2 G/DL (ref 3.2–4.8)
ALBUMIN/GLOB SERPL: 1.3 G/DL (ref 1.5–2.5)
ALP SERPL-CCNC: 102 U/L (ref 25–100)
ALT SERPL W P-5'-P-CCNC: 20 U/L (ref 7–40)
ANION GAP SERPL CALCULATED.3IONS-SCNC: 9 MMOL/L (ref 3–11)
AST SERPL-CCNC: 17 U/L (ref 0–33)
BILIRUB SERPL-MCNC: 0.2 MG/DL (ref 0.3–1.2)
BUN BLD-MCNC: 17 MG/DL (ref 9–23)
BUN/CREAT SERPL: 21.3 (ref 7–25)
CALCIUM SPEC-SCNC: 9.7 MG/DL (ref 8.7–10.4)
CHLORIDE SERPL-SCNC: 103 MMOL/L (ref 99–109)
CO2 SERPL-SCNC: 24 MMOL/L (ref 20–31)
CREAT BLD-MCNC: 0.8 MG/DL (ref 0.6–1.3)
ERYTHROCYTE [DISTWIDTH] IN BLOOD BY AUTOMATED COUNT: 13 % (ref 11.3–14.5)
GFR SERPL CREATININE-BSD FRML MDRD: 79 ML/MIN/1.73
GLOBULIN UR ELPH-MCNC: 3.2 GM/DL
GLUCOSE BLD-MCNC: 208 MG/DL (ref 70–100)
HCT VFR BLD AUTO: 38 % (ref 34.5–44)
HGB BLD-MCNC: 12.5 G/DL (ref 11.5–15.5)
LYMPHOCYTES # BLD AUTO: 1.2 10*3/MM3 (ref 0.6–4.8)
LYMPHOCYTES NFR BLD AUTO: 9.9 % (ref 24–44)
MCH RBC QN AUTO: 27.7 PG (ref 27–31)
MCHC RBC AUTO-ENTMCNC: 32.9 G/DL (ref 32–36)
MCV RBC AUTO: 84.2 FL (ref 80–99)
MONOCYTES # BLD AUTO: 0.5 10*3/MM3 (ref 0–1)
MONOCYTES NFR BLD AUTO: 3.8 % (ref 0–12)
NEUTROPHILS # BLD AUTO: 10.7 10*3/MM3 (ref 1.5–8.3)
NEUTROPHILS NFR BLD AUTO: 86.3 % (ref 41–71)
PLATELET # BLD AUTO: 376 10*3/MM3 (ref 150–450)
PMV BLD AUTO: 7.7 FL (ref 6–12)
POTASSIUM BLD-SCNC: 4 MMOL/L (ref 3.5–5.5)
PROT SERPL-MCNC: 7.4 G/DL (ref 5.7–8.2)
RBC # BLD AUTO: 4.51 10*6/MM3 (ref 3.89–5.14)
SODIUM BLD-SCNC: 136 MMOL/L (ref 132–146)
WBC NRBC COR # BLD: 12.4 10*3/MM3 (ref 3.5–10.8)

## 2017-05-09 PROCEDURE — 96413 CHEMO IV INFUSION 1 HR: CPT

## 2017-05-09 PROCEDURE — 80053 COMPREHEN METABOLIC PANEL: CPT

## 2017-05-09 PROCEDURE — 25010000002 PALONOSETRON PER 25 MCG: Performed by: INTERNAL MEDICINE

## 2017-05-09 PROCEDURE — 25010000002 CYCLOPHOSPHAMIDE PER 100 MG: Performed by: INTERNAL MEDICINE

## 2017-05-09 PROCEDURE — 99214 OFFICE O/P EST MOD 30 MIN: CPT | Performed by: INTERNAL MEDICINE

## 2017-05-09 PROCEDURE — 85025 COMPLETE CBC W/AUTO DIFF WBC: CPT

## 2017-05-09 PROCEDURE — 96417 CHEMO IV INFUS EACH ADDL SEQ: CPT

## 2017-05-09 PROCEDURE — 25010000002 PEGFILGRASTIM 6 MG/0.6ML PREFILLED SYRINGE KIT: Performed by: INTERNAL MEDICINE

## 2017-05-09 PROCEDURE — 36415 COLL VENOUS BLD VENIPUNCTURE: CPT

## 2017-05-09 PROCEDURE — 96375 TX/PRO/DX INJ NEW DRUG ADDON: CPT

## 2017-05-09 PROCEDURE — 96377 APPLICATON ON-BODY INJECTOR: CPT

## 2017-05-09 PROCEDURE — 25010000002 DOCETAXEL 20 MG/ML CONCENTRATION 4 ML VIAL: Performed by: INTERNAL MEDICINE

## 2017-05-09 RX ORDER — SODIUM CHLORIDE 9 MG/ML
250 INJECTION, SOLUTION INTRAVENOUS ONCE
Status: COMPLETED | OUTPATIENT
Start: 2017-05-09 | End: 2017-05-09

## 2017-05-09 RX ORDER — PALONOSETRON 0.05 MG/ML
0.25 INJECTION, SOLUTION INTRAVENOUS ONCE
Status: CANCELLED | OUTPATIENT
Start: 2017-05-09

## 2017-05-09 RX ORDER — PALONOSETRON 0.05 MG/ML
0.25 INJECTION, SOLUTION INTRAVENOUS ONCE
Status: COMPLETED | OUTPATIENT
Start: 2017-05-09 | End: 2017-05-09

## 2017-05-09 RX ORDER — SODIUM CHLORIDE 9 MG/ML
250 INJECTION, SOLUTION INTRAVENOUS ONCE
Status: CANCELLED | OUTPATIENT
Start: 2017-05-09

## 2017-05-09 RX ADMIN — DOCETAXEL 150 MG: 80 INJECTION, SOLUTION INTRAVENOUS at 10:57

## 2017-05-09 RX ADMIN — SODIUM CHLORIDE 250 ML: 9 INJECTION, SOLUTION INTRAVENOUS at 10:26

## 2017-05-09 RX ADMIN — PALONOSETRON HYDROCHLORIDE 0.25 MG: 0.25 INJECTION INTRAVENOUS at 10:27

## 2017-05-09 RX ADMIN — CYCLOPHOSPHAMIDE 1210 MG: 1 INJECTION, POWDER, FOR SOLUTION INTRAVENOUS; ORAL at 12:13

## 2017-05-09 RX ADMIN — PEGFILGRASTIM 6 MG: KIT SUBCUTANEOUS at 12:53

## 2017-05-12 ENCOUNTER — TELEPHONE (OUTPATIENT)
Dept: ONCOLOGY | Facility: CLINIC | Age: 42
End: 2017-05-12

## 2017-05-15 ENCOUNTER — TELEPHONE (OUTPATIENT)
Dept: ONCOLOGY | Facility: CLINIC | Age: 42
End: 2017-05-15

## 2017-05-17 ENCOUNTER — TELEPHONE (OUTPATIENT)
Dept: ONCOLOGY | Facility: CLINIC | Age: 42
End: 2017-05-17

## 2017-05-17 RX ORDER — SUMATRIPTAN 50 MG/1
TABLET, FILM COATED ORAL
Qty: 5 TABLET | Refills: 1 | Status: SHIPPED | OUTPATIENT
Start: 2017-05-17 | End: 2017-08-23 | Stop reason: SDUPTHER

## 2017-05-31 ENCOUNTER — APPOINTMENT (OUTPATIENT)
Dept: ONCOLOGY | Facility: HOSPITAL | Age: 42
End: 2017-05-31

## 2017-06-07 ENCOUNTER — APPOINTMENT (OUTPATIENT)
Dept: ONCOLOGY | Facility: HOSPITAL | Age: 42
End: 2017-06-07

## 2017-06-14 ENCOUNTER — APPOINTMENT (OUTPATIENT)
Dept: ONCOLOGY | Facility: HOSPITAL | Age: 42
End: 2017-06-14

## 2017-06-21 ENCOUNTER — APPOINTMENT (OUTPATIENT)
Dept: ONCOLOGY | Facility: HOSPITAL | Age: 42
End: 2017-06-21

## 2017-06-21 ENCOUNTER — OFFICE VISIT (OUTPATIENT)
Dept: ONCOLOGY | Facility: CLINIC | Age: 42
End: 2017-06-21

## 2017-06-21 VITALS
DIASTOLIC BLOOD PRESSURE: 56 MMHG | BODY MASS INDEX: 33.91 KG/M2 | HEIGHT: 66 IN | RESPIRATION RATE: 16 BRPM | TEMPERATURE: 98 F | WEIGHT: 211 LBS | SYSTOLIC BLOOD PRESSURE: 111 MMHG | HEART RATE: 68 BPM

## 2017-06-21 DIAGNOSIS — C50.912 MALIGNANT NEOPLASM OF LEFT FEMALE BREAST, UNSPECIFIED SITE OF BREAST: Primary | ICD-10-CM

## 2017-06-21 PROCEDURE — 99214 OFFICE O/P EST MOD 30 MIN: CPT | Performed by: INTERNAL MEDICINE

## 2017-06-21 RX ORDER — DOXYCYCLINE HYCLATE 100 MG/1
CAPSULE ORAL
COMMUNITY
Start: 2017-05-20 | End: 2017-08-02

## 2017-06-21 RX ORDER — VALACYCLOVIR HYDROCHLORIDE 1 G/1
1000 TABLET, FILM COATED ORAL 3 TIMES DAILY
COMMUNITY
Start: 2017-06-20 | End: 2017-08-02

## 2017-06-21 NOTE — PROGRESS NOTES
"      PROBLEM LIST:  1. [id number] invasive ductal carcinoma of the left breast, ER positive [place] positive HER-2 negative  A) the patient presented with skin dimpling under the left breast. A needle localized biopsy was done on 2/8/2017. Pathology showed a 2.2 cm invasive ductal carcinoma. There were focally positive margins.   Bilateral mastectomy done on 4/12/17.  Pathology showed residual in situ DICIS.  0/1 SLN involved.  [id number] (Stage IIA).  Oncotype score 34 in the high risk group.  Adjuvant chemotherapy with taxotere and cyclophophamide started 5/9/17.    Subjective     HISTORY OF PRESENT ILLNESS:   Mary Ambrosio returns for follow-up.   She received her first dose of TC and says it was ok other than a severe headache that lasted for nine days.  It finally resolved after imitrex.  She did not have much nausea and otherwise tolerated the treatment well.     Prior to her second cycle, the wound over the right breast expander opened up.  She had to have it re-sutured and her chemo has been on hold since then.  In the past week she has developed shingles affecting the left chest just below the breast.  The skin over the breast is mostly numb, but she does have some discomfort of the skin just under the breast.  She started on valtrex yesterday.    Past Medical History, Past Surgical History, Social History, Family History have been reviewed and are without significant changes except as mentioned.    Review of Systems   A comprehensive 14 point review of systems was performed and was negative except as mentioned.    Medications:  The current medication list was reviewed in the EMR    ALLERGIES:    Allergies   Allergen Reactions   • Tegaderm Ag Mesh [Silver] Rash       Objective      /56  Pulse 68  Temp 98 °F (36.7 °C)  Resp 16  Ht 66\" (167.6 cm)  Wt 211 lb (95.7 kg)  BMI 34.06 kg/m2     Performance Status: 0    General: well appearing female in no acute distress  Neuro: alert and oriented  HEENT: " sclera anicteric, oropharynx clear  Lymphatics: no cervical, supraclavicular, or axillary adenopathy  Chest: Status post bilateral mastectomy. There is a 3 mm opening in the center of the right breast incision without drainage.  Cardiovascular: regular rate and rhythm, no murmurs  Lungs: clear to auscultation bilaterally  Abdomen: soft, nontender, nondistended.  No palpable organomegaly  Extremeties: no lower extremity edema  Skin: small patch of vesicles under the left breast.  Diffuse erythema over the left tissue expander  Psych: mood and affect appropriate          Assessment/Plan   Mary Ambrosio is a 41 y.o. year old female with stage II a ER positive [place] positive HER-2 negative invasive ductal carcinoma of the left breast with a high risk oncotype.  She received 1 cycle of TC and has required delay of her chemotherapy due to a wound complication.  We will hopefully be able to resume treamtent next week.    She has developed shingles affecting the left chest wall, which is likely leading to the erythema of skin over the left tissue expander.  She will continue acyclovir for 2 weeks.  As long as this is improving I think we can still resume chemotherapy in the next 1-2 weeks.    She tolerated her treatment fairly well other than a prolonged migraine which finally resolved with imitrex.    I will plan to see her back again next week.            Visit time was 25 minutes, greater than 50% spent in counseling      Nancy Caruso MD  [facility]    6/21/2017          CC:

## 2017-07-12 ENCOUNTER — INFUSION (OUTPATIENT)
Dept: ONCOLOGY | Facility: HOSPITAL | Age: 42
End: 2017-07-12

## 2017-07-12 ENCOUNTER — OFFICE VISIT (OUTPATIENT)
Dept: ONCOLOGY | Facility: CLINIC | Age: 42
End: 2017-07-12

## 2017-07-12 VITALS
HEIGHT: 66 IN | WEIGHT: 215 LBS | BODY MASS INDEX: 34.55 KG/M2 | SYSTOLIC BLOOD PRESSURE: 126 MMHG | TEMPERATURE: 97.3 F | DIASTOLIC BLOOD PRESSURE: 61 MMHG | RESPIRATION RATE: 16 BRPM | HEART RATE: 59 BPM

## 2017-07-12 DIAGNOSIS — C50.912 MALIGNANT NEOPLASM OF LEFT FEMALE BREAST, UNSPECIFIED SITE OF BREAST: Primary | ICD-10-CM

## 2017-07-12 DIAGNOSIS — C50.912 MALIGNANT NEOPLASM OF LEFT FEMALE BREAST, UNSPECIFIED SITE OF BREAST: ICD-10-CM

## 2017-07-12 LAB
ALBUMIN SERPL-MCNC: 4.3 G/DL (ref 3.2–4.8)
ALBUMIN/GLOB SERPL: 1.4 G/DL (ref 1.5–2.5)
ALP SERPL-CCNC: 75 U/L (ref 25–100)
ALT SERPL W P-5'-P-CCNC: 30 U/L (ref 7–40)
ANION GAP SERPL CALCULATED.3IONS-SCNC: 7 MMOL/L (ref 3–11)
AST SERPL-CCNC: 31 U/L (ref 0–33)
BILIRUB SERPL-MCNC: 0.5 MG/DL (ref 0.3–1.2)
BUN BLD-MCNC: 12 MG/DL (ref 9–23)
BUN/CREAT SERPL: 17.1 (ref 7–25)
CALCIUM SPEC-SCNC: 10.3 MG/DL (ref 8.7–10.4)
CHLORIDE SERPL-SCNC: 106 MMOL/L (ref 99–109)
CO2 SERPL-SCNC: 23 MMOL/L (ref 20–31)
CREAT BLD-MCNC: 0.7 MG/DL (ref 0.6–1.3)
CREAT BLDA-MCNC: 0.7 MG/DL (ref 0.6–1.3)
ERYTHROCYTE [DISTWIDTH] IN BLOOD BY AUTOMATED COUNT: 14.8 % (ref 11.3–14.5)
GFR SERPL CREATININE-BSD FRML MDRD: 93 ML/MIN/1.73
GLOBULIN UR ELPH-MCNC: 3.1 GM/DL
GLUCOSE BLD-MCNC: 124 MG/DL (ref 70–100)
HCT VFR BLD AUTO: 40 % (ref 34.5–44)
HGB BLD-MCNC: 13 G/DL (ref 11.5–15.5)
LYMPHOCYTES # BLD AUTO: 1.3 10*3/MM3 (ref 0.6–4.8)
LYMPHOCYTES NFR BLD AUTO: 7.9 % (ref 24–44)
MCH RBC QN AUTO: 27.6 PG (ref 27–31)
MCHC RBC AUTO-ENTMCNC: 32.5 G/DL (ref 32–36)
MCV RBC AUTO: 84.9 FL (ref 80–99)
MONOCYTES # BLD AUTO: 0.1 10*3/MM3 (ref 0–1)
MONOCYTES NFR BLD AUTO: 0.4 % (ref 0–12)
NEUTROPHILS # BLD AUTO: 15.1 10*3/MM3 (ref 1.5–8.3)
NEUTROPHILS NFR BLD AUTO: 91.7 % (ref 41–71)
PLATELET # BLD AUTO: 389 10*3/MM3 (ref 150–450)
PMV BLD AUTO: 7.9 FL (ref 6–12)
POTASSIUM BLD-SCNC: 4.2 MMOL/L (ref 3.5–5.5)
PROT SERPL-MCNC: 7.4 G/DL (ref 5.7–8.2)
RBC # BLD AUTO: 4.7 10*6/MM3 (ref 3.89–5.14)
SODIUM BLD-SCNC: 136 MMOL/L (ref 132–146)
WBC NRBC COR # BLD: 16.5 10*3/MM3 (ref 3.5–10.8)

## 2017-07-12 PROCEDURE — 96377 APPLICATON ON-BODY INJECTOR: CPT

## 2017-07-12 PROCEDURE — 25010000002 CYCLOPHOSPHAMIDE PER 100 MG: Performed by: INTERNAL MEDICINE

## 2017-07-12 PROCEDURE — 99214 OFFICE O/P EST MOD 30 MIN: CPT | Performed by: INTERNAL MEDICINE

## 2017-07-12 PROCEDURE — 96417 CHEMO IV INFUS EACH ADDL SEQ: CPT

## 2017-07-12 PROCEDURE — 25010000002 PALONOSETRON PER 25 MCG: Performed by: INTERNAL MEDICINE

## 2017-07-12 PROCEDURE — 96375 TX/PRO/DX INJ NEW DRUG ADDON: CPT

## 2017-07-12 PROCEDURE — 85025 COMPLETE CBC W/AUTO DIFF WBC: CPT

## 2017-07-12 PROCEDURE — 36415 COLL VENOUS BLD VENIPUNCTURE: CPT

## 2017-07-12 PROCEDURE — 25010000002 PEGFILGRASTIM 6 MG/0.6ML PREFILLED SYRINGE KIT: Performed by: INTERNAL MEDICINE

## 2017-07-12 PROCEDURE — 25010000002 DOCETAXEL 20 MG/ML CONCENTRATION 4 ML VIAL: Performed by: INTERNAL MEDICINE

## 2017-07-12 PROCEDURE — 96413 CHEMO IV INFUSION 1 HR: CPT

## 2017-07-12 PROCEDURE — 82565 ASSAY OF CREATININE: CPT

## 2017-07-12 PROCEDURE — 80053 COMPREHEN METABOLIC PANEL: CPT

## 2017-07-12 RX ORDER — PALONOSETRON 0.05 MG/ML
0.25 INJECTION, SOLUTION INTRAVENOUS ONCE
Status: CANCELLED | OUTPATIENT
Start: 2017-07-12

## 2017-07-12 RX ORDER — SODIUM CHLORIDE 9 MG/ML
250 INJECTION, SOLUTION INTRAVENOUS ONCE
Status: COMPLETED | OUTPATIENT
Start: 2017-07-12 | End: 2017-07-12

## 2017-07-12 RX ORDER — PALONOSETRON 0.05 MG/ML
0.25 INJECTION, SOLUTION INTRAVENOUS ONCE
Status: COMPLETED | OUTPATIENT
Start: 2017-07-12 | End: 2017-07-12

## 2017-07-12 RX ORDER — SODIUM CHLORIDE 9 MG/ML
250 INJECTION, SOLUTION INTRAVENOUS ONCE
Status: CANCELLED | OUTPATIENT
Start: 2017-07-12

## 2017-07-12 RX ORDER — DEXAMETHASONE 4 MG/1
TABLET ORAL
Qty: 12 TABLET | Refills: 3 | Status: SHIPPED | OUTPATIENT
Start: 2017-07-12 | End: 2017-10-03

## 2017-07-12 RX ADMIN — SODIUM CHLORIDE 250 ML: 9 INJECTION, SOLUTION INTRAVENOUS at 14:04

## 2017-07-12 RX ADMIN — PALONOSETRON HYDROCHLORIDE 0.25 MG: 0.25 INJECTION INTRAVENOUS at 14:04

## 2017-07-12 RX ADMIN — CYCLOPHOSPHAMIDE 1210 MG: 1 INJECTION, POWDER, FOR SOLUTION INTRAVENOUS; ORAL at 15:21

## 2017-07-12 RX ADMIN — DOCETAXEL 150 MG: 80 INJECTION, SOLUTION INTRAVENOUS at 14:18

## 2017-07-12 RX ADMIN — PEGFILGRASTIM 6 MG: KIT SUBCUTANEOUS at 16:37

## 2017-07-12 NOTE — PROGRESS NOTES
"      PROBLEM LIST:  1. [id number] invasive ductal carcinoma of the left breast, ER positive [place] positive HER-2 negative  A) the patient presented with skin dimpling under the left breast. A needle localized biopsy was done on 2/8/2017. Pathology showed a 2.2 cm invasive ductal carcinoma. There were focally positive margins.   Bilateral mastectomy done on 4/12/17.  Pathology showed residual in situ DICIS.  0/1 SLN involved.  [id number] (Stage IIA).  Oncotype score 34 in the high risk group.  Adjuvant chemotherapy with taxotere and cyclophophamide started 5/9/17.  Long delay between cycle 1 and cycle 2 due to mastectomy wound infection.    Subjective     HISTORY OF PRESENT ILLNESS:   Mary Ambrosio returns for follow-up.   She is now ready to resume chemotherapy treatment.  She says her incisions are completely healed.  The shingles lesions are drying up and resolving.  She has completed taking valacyclovir.  She says she is doing well and is ready to restart treatment today.    Past Medical History, Past Surgical History, Social History, Family History have been reviewed and are without significant changes except as mentioned.    Review of Systems   A comprehensive 14 point review of systems was performed and was negative except as mentioned.    Medications:  The current medication list was reviewed in the EMR    ALLERGIES:    Allergies   Allergen Reactions   • Tegaderm Ag Mesh [Silver] Rash       Objective      /61 Comment: RUE  Pulse 59  Temp 97.3 °F (36.3 °C) (Temporal Artery )   Resp 16  Ht 66\" (167.6 cm)  Wt 215 lb (97.5 kg)  BMI 34.7 kg/m2     Performance Status: 0    General: well appearing female in no acute distress  Neuro: alert and oriented  HEENT: sclera anicteric, oropharynx clear  Lymphatics: no cervical, supraclavicular, or axillary adenopathy  Cardiovascular: regular rate and rhythm, no murmurs  Lungs: clear to auscultation bilaterally  Abdomen: soft, nontender, nondistended.  No palpable " organomegaly  Extremeties: no lower extremity edema  Skin: no rashes or lesions  Psych: mood and affect appropriate          Assessment/Plan   Mary Ambrosio is a 41 y.o. year old female with stage II a ER positive [place] positive HER-2 negative invasive ductal carcinoma of the left breast with a high risk oncotype.  She returns to resume chemotherapy treatment with TC.  We will proceed with cycle 2 today.  She will receive a total of 4 doses.  With her previous treatment she had a migraine headache.  She has Valtrex to use if needed.  She will call if she has uncontrolled symptoms.    Shingles: Status post Valtrex treatment.  The lesions are resolving and no longer symptomatic.        F/u in 3 weeks for cycle 3.            Visit time was 25 minutes, greater than 50% spent in counseling      Nancy Caruso MD  [facility]    7/12/2017          CC:

## 2017-08-02 ENCOUNTER — OFFICE VISIT (OUTPATIENT)
Dept: ONCOLOGY | Facility: CLINIC | Age: 42
End: 2017-08-02

## 2017-08-02 ENCOUNTER — INFUSION (OUTPATIENT)
Dept: ONCOLOGY | Facility: HOSPITAL | Age: 42
End: 2017-08-02

## 2017-08-02 VITALS
HEIGHT: 66 IN | DIASTOLIC BLOOD PRESSURE: 60 MMHG | HEART RATE: 91 BPM | TEMPERATURE: 97.2 F | BODY MASS INDEX: 35.36 KG/M2 | SYSTOLIC BLOOD PRESSURE: 140 MMHG | RESPIRATION RATE: 16 BRPM | WEIGHT: 220 LBS

## 2017-08-02 DIAGNOSIS — C50.912 MALIGNANT NEOPLASM OF LEFT FEMALE BREAST, UNSPECIFIED SITE OF BREAST: ICD-10-CM

## 2017-08-02 DIAGNOSIS — C50.912 MALIGNANT NEOPLASM OF LEFT FEMALE BREAST, UNSPECIFIED SITE OF BREAST: Primary | ICD-10-CM

## 2017-08-02 LAB
ALBUMIN SERPL-MCNC: 4.5 G/DL (ref 3.2–4.8)
ALBUMIN/GLOB SERPL: 1.4 G/DL (ref 1.5–2.5)
ALP SERPL-CCNC: 85 U/L (ref 25–100)
ALT SERPL W P-5'-P-CCNC: 42 U/L (ref 7–40)
ANION GAP SERPL CALCULATED.3IONS-SCNC: 9 MMOL/L (ref 3–11)
AST SERPL-CCNC: 27 U/L (ref 0–33)
BILIRUB SERPL-MCNC: 0.3 MG/DL (ref 0.3–1.2)
BUN BLD-MCNC: 14 MG/DL (ref 9–23)
BUN/CREAT SERPL: 15.6 (ref 7–25)
CALCIUM SPEC-SCNC: 9.7 MG/DL (ref 8.7–10.4)
CHLORIDE SERPL-SCNC: 106 MMOL/L (ref 99–109)
CO2 SERPL-SCNC: 22 MMOL/L (ref 20–31)
CREAT BLD-MCNC: 0.9 MG/DL (ref 0.6–1.3)
ERYTHROCYTE [DISTWIDTH] IN BLOOD BY AUTOMATED COUNT: 15.4 % (ref 11.3–14.5)
GFR SERPL CREATININE-BSD FRML MDRD: 69 ML/MIN/1.73
GLOBULIN UR ELPH-MCNC: 3.2 GM/DL
GLUCOSE BLD-MCNC: 198 MG/DL (ref 70–100)
HCT VFR BLD AUTO: 37.6 % (ref 34.5–44)
HGB BLD-MCNC: 12.3 G/DL (ref 11.5–15.5)
LYMPHOCYTES # BLD AUTO: 1.1 10*3/MM3 (ref 0.6–4.8)
LYMPHOCYTES NFR BLD AUTO: 6.9 % (ref 24–44)
MAGNESIUM SERPL-MCNC: 2 MG/DL (ref 1.3–2.7)
MCH RBC QN AUTO: 27.8 PG (ref 27–31)
MCHC RBC AUTO-ENTMCNC: 32.6 G/DL (ref 32–36)
MCV RBC AUTO: 85.1 FL (ref 80–99)
MONOCYTES # BLD AUTO: 0.3 10*3/MM3 (ref 0–1)
MONOCYTES NFR BLD AUTO: 2.2 % (ref 0–12)
NEUTROPHILS # BLD AUTO: 13.9 10*3/MM3 (ref 1.5–8.3)
NEUTROPHILS NFR BLD AUTO: 90.9 % (ref 41–71)
PLATELET # BLD AUTO: 449 10*3/MM3 (ref 150–450)
PMV BLD AUTO: 7.8 FL (ref 6–12)
POTASSIUM BLD-SCNC: 4 MMOL/L (ref 3.5–5.5)
PROT SERPL-MCNC: 7.7 G/DL (ref 5.7–8.2)
RBC # BLD AUTO: 4.42 10*6/MM3 (ref 3.89–5.14)
SODIUM BLD-SCNC: 137 MMOL/L (ref 132–146)
WBC NRBC COR # BLD: 15.3 10*3/MM3 (ref 3.5–10.8)

## 2017-08-02 PROCEDURE — 96375 TX/PRO/DX INJ NEW DRUG ADDON: CPT

## 2017-08-02 PROCEDURE — 83735 ASSAY OF MAGNESIUM: CPT

## 2017-08-02 PROCEDURE — 85025 COMPLETE CBC W/AUTO DIFF WBC: CPT

## 2017-08-02 PROCEDURE — 99214 OFFICE O/P EST MOD 30 MIN: CPT | Performed by: INTERNAL MEDICINE

## 2017-08-02 PROCEDURE — 36415 COLL VENOUS BLD VENIPUNCTURE: CPT

## 2017-08-02 PROCEDURE — 96377 APPLICATON ON-BODY INJECTOR: CPT

## 2017-08-02 PROCEDURE — 25010000002 CYCLOPHOSPHAMIDE PER 100 MG: Performed by: INTERNAL MEDICINE

## 2017-08-02 PROCEDURE — 96415 CHEMO IV INFUSION ADDL HR: CPT

## 2017-08-02 PROCEDURE — 25010000002 PEGFILGRASTIM 6 MG/0.6ML PREFILLED SYRINGE KIT: Performed by: INTERNAL MEDICINE

## 2017-08-02 PROCEDURE — 25010000002 DOCETAXEL 20 MG/ML CONCENTRATION 4 ML VIAL: Performed by: INTERNAL MEDICINE

## 2017-08-02 PROCEDURE — 25010000002 PALONOSETRON PER 25 MCG: Performed by: INTERNAL MEDICINE

## 2017-08-02 PROCEDURE — 80053 COMPREHEN METABOLIC PANEL: CPT

## 2017-08-02 PROCEDURE — 96413 CHEMO IV INFUSION 1 HR: CPT

## 2017-08-02 PROCEDURE — 96417 CHEMO IV INFUS EACH ADDL SEQ: CPT

## 2017-08-02 PROCEDURE — 96372 THER/PROPH/DIAG INJ SC/IM: CPT

## 2017-08-02 RX ORDER — SODIUM CHLORIDE 9 MG/ML
250 INJECTION, SOLUTION INTRAVENOUS ONCE
Status: COMPLETED | OUTPATIENT
Start: 2017-08-02 | End: 2017-08-02

## 2017-08-02 RX ORDER — PALONOSETRON 0.05 MG/ML
0.25 INJECTION, SOLUTION INTRAVENOUS ONCE
Status: CANCELLED | OUTPATIENT
Start: 2017-08-02

## 2017-08-02 RX ORDER — SODIUM CHLORIDE 9 MG/ML
250 INJECTION, SOLUTION INTRAVENOUS ONCE
Status: CANCELLED | OUTPATIENT
Start: 2017-08-02

## 2017-08-02 RX ORDER — PALONOSETRON 0.05 MG/ML
0.25 INJECTION, SOLUTION INTRAVENOUS ONCE
Status: COMPLETED | OUTPATIENT
Start: 2017-08-02 | End: 2017-08-02

## 2017-08-02 RX ADMIN — CYCLOPHOSPHAMIDE 1210 MG: 1 INJECTION, POWDER, FOR SOLUTION INTRAVENOUS; ORAL at 12:37

## 2017-08-02 RX ADMIN — SODIUM CHLORIDE 250 ML: 9 INJECTION, SOLUTION INTRAVENOUS at 11:07

## 2017-08-02 RX ADMIN — DOCETAXEL 150 MG: 80 INJECTION, SOLUTION INTRAVENOUS at 11:28

## 2017-08-02 RX ADMIN — PEGFILGRASTIM 6 MG: KIT SUBCUTANEOUS at 13:46

## 2017-08-02 RX ADMIN — PALONOSETRON HYDROCHLORIDE 0.25 MG: 0.25 INJECTION INTRAVENOUS at 11:06

## 2017-08-02 NOTE — PROGRESS NOTES
"      PROBLEM LIST:  1. [id number] invasive ductal carcinoma of the left breast, ER positive OR positive HER-2 negative  A) the patient presented with skin dimpling under the left breast. A needle localized biopsy was done on 2/8/2017. Pathology showed a 2.2 cm invasive ductal carcinoma. There were focally positive margins.   Bilateral mastectomy done on 4/12/17.  Pathology showed residual in situ DICIS.  0/1 SLN involved.  [id number] (Stage IIA).  Oncotype score 34 in the high risk group.  Adjuvant chemotherapy with taxotere and cyclophophamide started 5/9/17.  Long delay between cycle 1 and cycle 2 due to mastectomy wound infection.    Subjective     HISTORY OF PRESENT ILLNESS:   Mary Ambrosio returns for follow-up.   She again had a headache after this cycle but was able to manage it with Imitrex.  She did not have any nausea or diarrhea.  She did notice some muscle twitching and spasms.  The muscles around her eyes were twitching frequently.  She had some muscle cramps in her legs at night.    Past Medical History, Past Surgical History, Social History, Family History have been reviewed and are without significant changes except as mentioned.    Review of Systems   A comprehensive 14 point review of systems was performed and was negative except as mentioned.    Medications:  The current medication list was reviewed in the EMR    ALLERGIES:    Allergies   Allergen Reactions   • Tegaderm Ag Mesh [Silver] Rash       Objective      /60 Comment: RUE  Pulse 91  Temp 97.2 °F (36.2 °C) (Temporal Artery )   Resp 16  Ht 66\" (167.6 cm)  Wt 220 lb (99.8 kg)  BMI 35.51 kg/m2     Performance Status: 0    General: well appearing female in no acute distress  Neuro: alert and oriented  HEENT: sclera anicteric, oropharynx clear  Lymphatics: no cervical, supraclavicular, or axillary adenopathy  Cardiovascular: regular rate and rhythm, no murmurs  Lungs: clear to auscultation bilaterally  Abdomen: soft, nontender, " nondistended.  No palpable organomegaly  Extremeties: no lower extremity edema  Skin: no rashes or lesions  Psych: mood and affect appropriate          Assessment/Plan   Mary Ambrosio is a 42 y.o. year old female with stage II a ER positive OR positive HER-2 negative invasive ductal carcinoma of the left breast with a high risk oncotype.  She is here for her third cycle of Taxotere and cyclophosphamide.  She is doing pretty well with treatment but is having some headaches and muscle cramps.  For her muscle cramps I recommended drinking plenty of fluids and doing some light exercise and stretching before bed.  I will check her potassium and magnesium levels today and replace these if needed.  She does have some Flexeril left from her surgery and she can use this at night time if needed for sleeping.          F/u in 3 weeks for cycle 4.            Visit time was 25 minutes, greater than 50% spent in counseling      Nancy Caruso MD  [facility]    8/2/2017          CC:

## 2017-08-10 ENCOUNTER — TELEPHONE (OUTPATIENT)
Dept: ONCOLOGY | Facility: HOSPITAL | Age: 42
End: 2017-08-10

## 2017-08-10 RX ORDER — METHYLPREDNISOLONE 4 MG/1
TABLET ORAL
Qty: 21 TABLET | Refills: 0 | Status: SHIPPED | OUTPATIENT
Start: 2017-08-10 | End: 2017-10-03

## 2017-08-10 NOTE — TELEPHONE ENCOUNTER
Spoke with Mary Ambrosio via telephone about a developing rash. She reports the rash began last night on her arms, she took benadryl before going to bed which provided no relief. This morning she woke up and the rash has spread throughout her body and is described as big, red raised spots. She took benadryl again this morning without relief. She is not experiencing pain with the rash, only itching. She denies changes in her voice or difficulty breathing. A prescription for Medrol dosepak (methyprednisolone 4 mg, quantity 21) will be called into her pharmacy today. I discussed with her how to take the methyprednisolone (6 tablets today, 5 tabs the next day, etc.). She was instructed to call the clinic if her rash does not improve.    Radha Cummings, PharmD  Pharmacy Resident  8/10/2017  10:55 AM

## 2017-08-21 ENCOUNTER — TELEPHONE (OUTPATIENT)
Dept: ONCOLOGY | Facility: CLINIC | Age: 42
End: 2017-08-21

## 2017-08-21 DIAGNOSIS — C50.112 MALIGNANT NEOPLASM OF CENTRAL PORTION OF LEFT FEMALE BREAST (HCC): Primary | ICD-10-CM

## 2017-08-21 RX ORDER — HYDROXYZINE PAMOATE 25 MG/1
25 CAPSULE ORAL EVERY 6 HOURS PRN
Qty: 60 CAPSULE | Refills: 1 | Status: SHIPPED | OUTPATIENT
Start: 2017-08-21 | End: 2017-10-03

## 2017-08-21 NOTE — TELEPHONE ENCOUNTER
----- Message from Alice Cano sent at 8/21/2017 11:35 AM EDT -----  Regarding: ADY - SIDE EFFECTS FROM CHEMO   Contact: [phone]  PATIENT CALLED REGARDING SIDE EFFECTS FROM CHEMO.    PATIENT IS STILL BREAKING OUT IN HIVES ALL OVER. SHE WAS PUT ON A HIGH DOSAGE OF PREDNISONE UNTIL SHE FOLLOWS UP WITH DR. GARSIA BY THE [facility] IN [place].     SHE SEES DR. GARSIA WEDNESDAY BEFORE CHEMO, BUT WANTS TO KNOW IF THERE IS SOMETHING ELSE THEY SHOULD TRY BEFORE THEN.

## 2017-08-21 NOTE — TELEPHONE ENCOUNTER
Dr Ceron ordered visteral for itch and advised to use benadryl cream for rash/hives.  Patient called and educated.

## 2017-08-23 ENCOUNTER — OFFICE VISIT (OUTPATIENT)
Dept: ONCOLOGY | Facility: CLINIC | Age: 42
End: 2017-08-23

## 2017-08-23 ENCOUNTER — APPOINTMENT (OUTPATIENT)
Dept: ONCOLOGY | Facility: HOSPITAL | Age: 42
End: 2017-08-23

## 2017-08-23 VITALS
SYSTOLIC BLOOD PRESSURE: 132 MMHG | DIASTOLIC BLOOD PRESSURE: 69 MMHG | HEIGHT: 66 IN | BODY MASS INDEX: 35.68 KG/M2 | RESPIRATION RATE: 18 BRPM | TEMPERATURE: 97.2 F | WEIGHT: 222 LBS | HEART RATE: 83 BPM

## 2017-08-23 DIAGNOSIS — C50.112 MALIGNANT NEOPLASM OF CENTRAL PORTION OF LEFT FEMALE BREAST (HCC): Primary | ICD-10-CM

## 2017-08-23 PROCEDURE — S0354 CANCER TREATMENT PLAN CHANGE: HCPCS | Performed by: INTERNAL MEDICINE

## 2017-08-23 PROCEDURE — 99214 OFFICE O/P EST MOD 30 MIN: CPT | Performed by: INTERNAL MEDICINE

## 2017-08-23 RX ORDER — PREDNISONE 1 MG/1
TABLET ORAL
COMMUNITY
Start: 2017-08-12 | End: 2017-10-03

## 2017-08-23 RX ORDER — SUMATRIPTAN 50 MG/1
TABLET, FILM COATED ORAL
Qty: 5 TABLET | Refills: 1 | Status: SHIPPED | OUTPATIENT
Start: 2017-08-23 | End: 2017-12-27 | Stop reason: SDUPTHER

## 2017-08-23 NOTE — PROGRESS NOTES
PROBLEM LIST:  1. [id number] invasive ductal carcinoma of the left breast, ER positive [place] positive HER-2 negative  A) the patient presented with skin dimpling under the left breast. A needle localized biopsy was done on 2/8/2017. Pathology showed a 2.2 cm invasive ductal carcinoma. There were focally positive margins.   Bilateral mastectomy done on 4/12/17.  Pathology showed residual in situ DICIS.  0/1 SLN involved.  [id number] (Stage IIA).  Oncotype score 34 in the high risk group.  Adjuvant chemotherapy with taxotere and cyclophophamide started 5/9/17.  Long delay between cycle 1 and cycle 2 due to mastectomy wound infection.  Severe allergic reaction after cycle 3 with hives, tightness in throat, and swelling of face that lasted for over 2 weeks despite high dose steroids.  CMF given for cycle #4.    Subjective     HISTORY OF PRESENT ILLNESS:   Mary Ambrosio returns for follow-up.  About a week after this last cycle she developed a whole-body rash consisting of hives and significant swelling of her face and around her eyes.  She started having tightness in her throat she went to the emergency room where she received a dose of steroids.  She was discharged on prednisone and has been taking Benadryl and Vistaril around-the-clock.  Despite this she continues to develop new hives now 2 weeks into it.  The throat swelling and facial swelling have resolved.  She was taking 20 mg of prednisone daily, but stopped this yesterday to begin her dexamethasone that she normally takes prior to treatment.    Past Medical History, Past Surgical History, Social History, Family History have been reviewed and are without significant changes except as mentioned.    Review of Systems   A comprehensive 14 point review of systems was performed and was negative except as mentioned.    Medications:  The current medication list was reviewed in the EMR    ALLERGIES:    Allergies   Allergen Reactions   • Taxotere [Docetaxel] Hives and  "Shortness Of Breath   • Tegaderm Ag Mesh [Silver] Rash       Objective      /69  Pulse 83  Temp 97.2 °F (36.2 °C)  Resp 18  Ht 66\" (167.6 cm)  Wt 222 lb (101 kg)  BMI 35.83 kg/m2     Performance Status: 0    General: well appearing female in no acute distress  Neuro: alert and oriented  HEENT: sclera anicteric, oropharynx clear  Lymphatics: no cervical, supraclavicular, or axillary adenopathy  Cardiovascular: regular rate and rhythm, no murmurs  Lungs: clear to auscultation bilaterally  Abdomen: soft, nontender, nondistended.  No palpable organomegaly  Extremeties: no lower extremity edema  Skin: No current rashes or urticaria.  Psych: mood and affect appropriate          Assessment/Plan   Mary Ambrosio is a 42 y.o. year old female with stage II a ER positive [place] positive HER-2 negative invasive ductal carcinoma of the left breast with a high risk oncotype.  After cycle 3 of Taxotere and cyclophosphamide she developed a severe allergic reaction with hives, throat swelling, and facial edema.  This is improving but she continues to have symptoms now 2 weeks after the reaction first appeared.  I'm not comfortable giving her additional Taxotere or really any taxanes.  We discussed stopping chemotherapy now versus getting a fourth cycle with different medications.  Given the long delay between cycle 1 and 2, I would feel more comfortable with giving her a fourth cycle of treatment.  We will plan to treat with cyclophosphamide, methotrexate, and fluorouracil.  We will delay until next week, as I would like her reaction to be completely resolved before we resume any type of chemotherapy.  We discussed potential side effects of CMF including nausea, myelosuppression, diarrhea, mouth sores, and fatigue.  She is additionally developing some neuropathy symptoms, so this is another reason why stopping Taxotere is beneficial.    I will see her back again in one week.                  Visit time was 25 minutes, " greater than 50% spent in counseling      Nancy Caruso MD  [facility] Hematology and Oncology    8/23/2017          CC:

## 2017-08-30 ENCOUNTER — OFFICE VISIT (OUTPATIENT)
Dept: ONCOLOGY | Facility: CLINIC | Age: 42
End: 2017-08-30

## 2017-08-30 ENCOUNTER — INFUSION (OUTPATIENT)
Dept: ONCOLOGY | Facility: HOSPITAL | Age: 42
End: 2017-08-30

## 2017-08-30 VITALS
RESPIRATION RATE: 16 BRPM | SYSTOLIC BLOOD PRESSURE: 138 MMHG | WEIGHT: 222 LBS | HEART RATE: 96 BPM | HEIGHT: 66 IN | BODY MASS INDEX: 35.68 KG/M2 | DIASTOLIC BLOOD PRESSURE: 63 MMHG | TEMPERATURE: 97.7 F

## 2017-08-30 DIAGNOSIS — C50.112 MALIGNANT NEOPLASM OF CENTRAL PORTION OF LEFT FEMALE BREAST (HCC): Primary | ICD-10-CM

## 2017-08-30 LAB
ALBUMIN SERPL-MCNC: 4.3 G/DL (ref 3.2–4.8)
ALBUMIN/GLOB SERPL: 1.6 G/DL (ref 1.5–2.5)
ALP SERPL-CCNC: 88 U/L (ref 25–100)
ALT SERPL W P-5'-P-CCNC: 50 U/L (ref 7–40)
ANION GAP SERPL CALCULATED.3IONS-SCNC: 10 MMOL/L (ref 3–11)
AST SERPL-CCNC: 31 U/L (ref 0–33)
BILIRUB SERPL-MCNC: 0.5 MG/DL (ref 0.3–1.2)
BUN BLD-MCNC: 15 MG/DL (ref 9–23)
BUN/CREAT SERPL: 18.8 (ref 7–25)
CALCIUM SPEC-SCNC: 9.3 MG/DL (ref 8.7–10.4)
CHLORIDE SERPL-SCNC: 103 MMOL/L (ref 99–109)
CO2 SERPL-SCNC: 25 MMOL/L (ref 20–31)
CREAT BLD-MCNC: 0.8 MG/DL (ref 0.6–1.3)
ERYTHROCYTE [DISTWIDTH] IN BLOOD BY AUTOMATED COUNT: 16.4 % (ref 11.3–14.5)
GFR SERPL CREATININE-BSD FRML MDRD: 79 ML/MIN/1.73
GLOBULIN UR ELPH-MCNC: 2.7 GM/DL
GLUCOSE BLD-MCNC: 111 MG/DL (ref 70–100)
HCT VFR BLD AUTO: 40.4 % (ref 34.5–44)
HGB BLD-MCNC: 13.4 G/DL (ref 11.5–15.5)
LYMPHOCYTES # BLD AUTO: 2.1 10*3/MM3 (ref 0.6–4.8)
LYMPHOCYTES NFR BLD AUTO: 13.8 % (ref 24–44)
MCH RBC QN AUTO: 28.7 PG (ref 27–31)
MCHC RBC AUTO-ENTMCNC: 33.1 G/DL (ref 32–36)
MCV RBC AUTO: 86.7 FL (ref 80–99)
MONOCYTES # BLD AUTO: 0.9 10*3/MM3 (ref 0–1)
MONOCYTES NFR BLD AUTO: 5.7 % (ref 0–12)
NEUTROPHILS # BLD AUTO: 12.2 10*3/MM3 (ref 1.5–8.3)
NEUTROPHILS NFR BLD AUTO: 80.5 % (ref 41–71)
PLATELET # BLD AUTO: 386 10*3/MM3 (ref 150–450)
PMV BLD AUTO: 6.7 FL (ref 6–12)
POTASSIUM BLD-SCNC: 3.8 MMOL/L (ref 3.5–5.5)
PROT SERPL-MCNC: 7 G/DL (ref 5.7–8.2)
RBC # BLD AUTO: 4.66 10*6/MM3 (ref 3.89–5.14)
SODIUM BLD-SCNC: 138 MMOL/L (ref 132–146)
WBC NRBC COR # BLD: 15.1 10*3/MM3 (ref 3.5–10.8)

## 2017-08-30 PROCEDURE — 80053 COMPREHEN METABOLIC PANEL: CPT

## 2017-08-30 PROCEDURE — 96375 TX/PRO/DX INJ NEW DRUG ADDON: CPT

## 2017-08-30 PROCEDURE — 96411 CHEMO IV PUSH ADDL DRUG: CPT

## 2017-08-30 PROCEDURE — 25010000002 METHOTREXATE PF 100 MG/4ML SOLUTION: Performed by: INTERNAL MEDICINE

## 2017-08-30 PROCEDURE — 36415 COLL VENOUS BLD VENIPUNCTURE: CPT

## 2017-08-30 PROCEDURE — 25010000002 FLUOROURACIL PER 500 MG: Performed by: INTERNAL MEDICINE

## 2017-08-30 PROCEDURE — 85025 COMPLETE CBC W/AUTO DIFF WBC: CPT

## 2017-08-30 PROCEDURE — 96413 CHEMO IV INFUSION 1 HR: CPT

## 2017-08-30 PROCEDURE — 99213 OFFICE O/P EST LOW 20 MIN: CPT | Performed by: INTERNAL MEDICINE

## 2017-08-30 PROCEDURE — 25010000002 PALONOSETRON PER 25 MCG: Performed by: INTERNAL MEDICINE

## 2017-08-30 PROCEDURE — 25010000002 CYCLOPHOSPHAMIDE PER 100 MG: Performed by: INTERNAL MEDICINE

## 2017-08-30 PROCEDURE — 25010000002 DEXAMETHASONE PER 1 MG: Performed by: INTERNAL MEDICINE

## 2017-08-30 RX ORDER — METHOTREXATE SODIUM 25 MG/ML
40 INJECTION, SOLUTION INTRA-ARTERIAL; INTRAMUSCULAR; INTRAVENOUS ONCE
Status: CANCELLED | OUTPATIENT
Start: 2017-09-06

## 2017-08-30 RX ORDER — PALONOSETRON 0.05 MG/ML
0.25 INJECTION, SOLUTION INTRAVENOUS ONCE
Status: CANCELLED | OUTPATIENT
Start: 2017-08-30

## 2017-08-30 RX ORDER — METHOTREXATE SODIUM 25 MG/ML
40 INJECTION, SOLUTION INTRA-ARTERIAL; INTRAMUSCULAR; INTRAVENOUS ONCE
Status: CANCELLED | OUTPATIENT
Start: 2017-08-30

## 2017-08-30 RX ORDER — METHOTREXATE 25 MG/ML
40 INJECTION, SOLUTION INTRA-ARTERIAL; INTRAMUSCULAR; INTRATHECAL; INTRAVENOUS ONCE
Status: COMPLETED | OUTPATIENT
Start: 2017-08-30 | End: 2017-08-30

## 2017-08-30 RX ORDER — PALONOSETRON 0.05 MG/ML
0.25 INJECTION, SOLUTION INTRAVENOUS ONCE
Status: CANCELLED | OUTPATIENT
Start: 2017-09-06

## 2017-08-30 RX ORDER — SODIUM CHLORIDE 9 MG/ML
250 INJECTION, SOLUTION INTRAVENOUS ONCE
Status: CANCELLED | OUTPATIENT
Start: 2017-08-30

## 2017-08-30 RX ORDER — SODIUM CHLORIDE 9 MG/ML
250 INJECTION, SOLUTION INTRAVENOUS ONCE
Status: CANCELLED | OUTPATIENT
Start: 2017-09-06

## 2017-08-30 RX ORDER — SODIUM CHLORIDE 9 MG/ML
250 INJECTION, SOLUTION INTRAVENOUS ONCE
Status: COMPLETED | OUTPATIENT
Start: 2017-08-30 | End: 2017-08-30

## 2017-08-30 RX ORDER — FLUOROURACIL 50 MG/ML
600 INJECTION, SOLUTION INTRAVENOUS ONCE
Status: CANCELLED | OUTPATIENT
Start: 2017-09-06

## 2017-08-30 RX ORDER — FLUOROURACIL 50 MG/ML
600 INJECTION, SOLUTION INTRAVENOUS ONCE
Status: COMPLETED | OUTPATIENT
Start: 2017-08-30 | End: 2017-08-30

## 2017-08-30 RX ORDER — PALONOSETRON 0.05 MG/ML
0.25 INJECTION, SOLUTION INTRAVENOUS ONCE
Status: COMPLETED | OUTPATIENT
Start: 2017-08-30 | End: 2017-08-30

## 2017-08-30 RX ORDER — FLUOROURACIL 50 MG/ML
600 INJECTION, SOLUTION INTRAVENOUS ONCE
Status: CANCELLED | OUTPATIENT
Start: 2017-08-30

## 2017-08-30 RX ADMIN — FLUOROURACIL 1250 MG: 50 INJECTION, SOLUTION INTRAVENOUS at 12:28

## 2017-08-30 RX ADMIN — PALONOSETRON HYDROCHLORIDE 0.25 MG: 0.25 INJECTION INTRAVENOUS at 10:21

## 2017-08-30 RX ADMIN — DEXAMETHASONE SODIUM PHOSPHATE 12 MG: 4 INJECTION, SOLUTION INTRAMUSCULAR; INTRAVENOUS at 10:23

## 2017-08-30 RX ADMIN — CYCLOPHOSPHAMIDE 1250 MG: 1 INJECTION, POWDER, FOR SOLUTION INTRAVENOUS; ORAL at 11:20

## 2017-08-30 RX ADMIN — SODIUM CHLORIDE 250 ML: 9 INJECTION, SOLUTION INTRAVENOUS at 10:23

## 2017-08-30 RX ADMIN — METHOTREXATE 84 MG: 25 INJECTION, SOLUTION INTRA-ARTERIAL; INTRAMUSCULAR; INTRATHECAL; INTRAVENOUS at 12:25

## 2017-08-30 NOTE — PROGRESS NOTES
"      PROBLEM LIST:  1. [id number] invasive ductal carcinoma of the left breast, ER positive [place] positive HER-2 negative  A) the patient presented with skin dimpling under the left breast. A needle localized biopsy was done on 2/8/2017. Pathology showed a 2.2 cm invasive ductal carcinoma. There were focally positive margins.   Bilateral mastectomy done on 4/12/17.  Pathology showed residual in situ DICIS.  0/1 SLN involved.  [id number] (Stage IIA).  Oncotype score 34 in the high risk group.  Adjuvant chemotherapy with taxotere and cyclophophamide started 5/9/17.  Long delay between cycle 1 and cycle 2 due to mastectomy wound infection.  Severe allergic reaction after cycle 3 with hives, tightness in throat, and swelling of face that lasted for over 2 weeks despite high dose steroids.  CMF given for cycle #4.    Subjective     HISTORY OF PRESENT ILLNESS:   Mary Ambrosio returns for follow-up.  She says she is feeling well and her rash has completely resolved over the past 3-4 days.  She will take her last dose of prednisone tomorrow.  She is feeling well and is ready to finish her chemotherapy.    Past Medical History, Past Surgical History, Social History, Family History have been reviewed and are without significant changes except as mentioned.    Review of Systems   A comprehensive 14 point review of systems was performed and was negative except as mentioned.    Medications:  The current medication list was reviewed in the EMR    ALLERGIES:    Allergies   Allergen Reactions   • Taxotere [Docetaxel] Hives and Shortness Of Breath   • Tegaderm Ag Mesh [Silver] Rash       Objective      /63 Comment: RUE  Pulse 96  Temp 97.7 °F (36.5 °C) (Temporal Artery )   Resp 16  Ht 66\" (167.6 cm)  Wt 222 lb (101 kg)  BMI 35.83 kg/m2     Performance Status: 0    General: well appearing female in no acute distress  Neuro: alert and oriented  HEENT: sclera anicteric, oropharynx clear  Lymphatics: no cervical, " supraclavicular, or axillary adenopathy  Cardiovascular: regular rate and rhythm, no murmurs  Lungs: clear to auscultation bilaterally  Abdomen: soft, nontender, nondistended.  No palpable organomegaly  Extremeties: no lower extremity edema  Skin: No current rashes or urticaria.  Psych: mood and affect appropriate          Assessment/Plan   Mary Ambrosio is a 42 y.o. year old female with stage II a ER positive [place] positive HER-2 negative invasive ductal carcinoma of the left breast with a high risk oncotype.  We will proceed with cycle 4 of chemotherapy today using CMF.  She will receive day 8 treatment next week.  After her treatment is completed I will see her back in about a month to recheck labs.  At that point we will discuss hormonal therapy in more detail.              Visit time was 15 minutes, greater than 50% spent in counseling      Nancy Caruso MD  [facility] Hematology and Oncology    8/30/2017          CC:

## 2017-09-07 ENCOUNTER — INFUSION (OUTPATIENT)
Dept: ONCOLOGY | Facility: HOSPITAL | Age: 42
End: 2017-09-07

## 2017-09-07 VITALS
SYSTOLIC BLOOD PRESSURE: 133 MMHG | RESPIRATION RATE: 16 BRPM | HEART RATE: 93 BPM | TEMPERATURE: 97.7 F | DIASTOLIC BLOOD PRESSURE: 60 MMHG | HEIGHT: 66 IN

## 2017-09-07 DIAGNOSIS — C50.112 MALIGNANT NEOPLASM OF CENTRAL PORTION OF LEFT FEMALE BREAST (HCC): Primary | ICD-10-CM

## 2017-09-07 LAB
ALBUMIN SERPL-MCNC: 3.9 G/DL (ref 3.2–4.8)
ALBUMIN/GLOB SERPL: 1.6 G/DL (ref 1.5–2.5)
ALP SERPL-CCNC: 79 U/L (ref 25–100)
ALT SERPL W P-5'-P-CCNC: 36 U/L (ref 7–40)
ANION GAP SERPL CALCULATED.3IONS-SCNC: 9 MMOL/L (ref 3–11)
AST SERPL-CCNC: 23 U/L (ref 0–33)
BILIRUB SERPL-MCNC: 0.5 MG/DL (ref 0.3–1.2)
BUN BLD-MCNC: 14 MG/DL (ref 9–23)
BUN/CREAT SERPL: 20 (ref 7–25)
CALCIUM SPEC-SCNC: 9 MG/DL (ref 8.7–10.4)
CHLORIDE SERPL-SCNC: 105 MMOL/L (ref 99–109)
CO2 SERPL-SCNC: 21 MMOL/L (ref 20–31)
CREAT BLD-MCNC: 0.7 MG/DL (ref 0.6–1.3)
ERYTHROCYTE [DISTWIDTH] IN BLOOD BY AUTOMATED COUNT: 15.1 % (ref 11.3–14.5)
GFR SERPL CREATININE-BSD FRML MDRD: 92 ML/MIN/1.73
GLOBULIN UR ELPH-MCNC: 2.5 GM/DL
GLUCOSE BLD-MCNC: 117 MG/DL (ref 70–100)
HCT VFR BLD AUTO: 37.1 % (ref 34.5–44)
HGB BLD-MCNC: 11.8 G/DL (ref 11.5–15.5)
LYMPHOCYTES # BLD AUTO: 1.3 10*3/MM3 (ref 0.6–4.8)
LYMPHOCYTES NFR BLD AUTO: 19.4 % (ref 24–44)
MCH RBC QN AUTO: 27.8 PG (ref 27–31)
MCHC RBC AUTO-ENTMCNC: 31.9 G/DL (ref 32–36)
MCV RBC AUTO: 87.2 FL (ref 80–99)
MONOCYTES # BLD AUTO: 0.4 10*3/MM3 (ref 0–1)
MONOCYTES NFR BLD AUTO: 6.4 % (ref 0–12)
NEUTROPHILS # BLD AUTO: 5.1 10*3/MM3 (ref 1.5–8.3)
NEUTROPHILS NFR BLD AUTO: 74.2 % (ref 41–71)
PLATELET # BLD AUTO: 322 10*3/MM3 (ref 150–450)
PMV BLD AUTO: 6.9 FL (ref 6–12)
POTASSIUM BLD-SCNC: 3.9 MMOL/L (ref 3.5–5.5)
PROT SERPL-MCNC: 6.4 G/DL (ref 5.7–8.2)
RBC # BLD AUTO: 4.25 10*6/MM3 (ref 3.89–5.14)
SODIUM BLD-SCNC: 135 MMOL/L (ref 132–146)
WBC NRBC COR # BLD: 6.9 10*3/MM3 (ref 3.5–10.8)

## 2017-09-07 PROCEDURE — 96367 TX/PROPH/DG ADDL SEQ IV INF: CPT

## 2017-09-07 PROCEDURE — 25010000002 CYCLOPHOSPHAMIDE PER 100 MG: Performed by: INTERNAL MEDICINE

## 2017-09-07 PROCEDURE — 25010000002 METHOTREXATE SODIUM SOLUTION: Performed by: INTERNAL MEDICINE

## 2017-09-07 PROCEDURE — 25010000002 PALONOSETRON PER 25 MCG: Performed by: INTERNAL MEDICINE

## 2017-09-07 PROCEDURE — 96409 CHEMO IV PUSH SNGL DRUG: CPT

## 2017-09-07 PROCEDURE — 25010000002 DEXAMETHASONE PER 1 MG: Performed by: INTERNAL MEDICINE

## 2017-09-07 PROCEDURE — 96413 CHEMO IV INFUSION 1 HR: CPT

## 2017-09-07 PROCEDURE — 96411 CHEMO IV PUSH ADDL DRUG: CPT

## 2017-09-07 PROCEDURE — 85025 COMPLETE CBC W/AUTO DIFF WBC: CPT

## 2017-09-07 PROCEDURE — 80053 COMPREHEN METABOLIC PANEL: CPT

## 2017-09-07 PROCEDURE — 25010000002 FLUOROURACIL PER 500 MG: Performed by: INTERNAL MEDICINE

## 2017-09-07 PROCEDURE — 96375 TX/PRO/DX INJ NEW DRUG ADDON: CPT

## 2017-09-07 RX ORDER — FLUOROURACIL 50 MG/ML
600 INJECTION, SOLUTION INTRAVENOUS ONCE
Status: COMPLETED | OUTPATIENT
Start: 2017-09-07 | End: 2017-09-07

## 2017-09-07 RX ORDER — PALONOSETRON 0.05 MG/ML
0.25 INJECTION, SOLUTION INTRAVENOUS ONCE
Status: COMPLETED | OUTPATIENT
Start: 2017-09-07 | End: 2017-09-07

## 2017-09-07 RX ORDER — SODIUM CHLORIDE 9 MG/ML
250 INJECTION, SOLUTION INTRAVENOUS ONCE
Status: COMPLETED | OUTPATIENT
Start: 2017-09-07 | End: 2017-09-07

## 2017-09-07 RX ORDER — METHOTREXATE SODIUM 25 MG/ML
40 INJECTION, SOLUTION INTRA-ARTERIAL; INTRAMUSCULAR; INTRAVENOUS ONCE
Status: COMPLETED | OUTPATIENT
Start: 2017-09-07 | End: 2017-09-07

## 2017-09-07 RX ADMIN — PALONOSETRON HYDROCHLORIDE 0.25 MG: 0.25 INJECTION INTRAVENOUS at 10:09

## 2017-09-07 RX ADMIN — SODIUM CHLORIDE 250 ML: 9 INJECTION, SOLUTION INTRAVENOUS at 10:12

## 2017-09-07 RX ADMIN — METHOTREXATE 84 MG: 25 INJECTION INTRA-ARTERIAL; INTRAMUSCULAR; INTRATHECAL; INTRAVENOUS at 12:01

## 2017-09-07 RX ADMIN — DEXAMETHASONE SODIUM PHOSPHATE 12 MG: 4 INJECTION, SOLUTION INTRAMUSCULAR; INTRAVENOUS at 10:11

## 2017-09-07 RX ADMIN — FLUOROURACIL 1250 MG: 50 INJECTION, SOLUTION INTRAVENOUS at 12:12

## 2017-09-07 RX ADMIN — CYCLOPHOSPHAMIDE 1250 MG: 1 INJECTION, POWDER, FOR SOLUTION INTRAVENOUS; ORAL at 10:54

## 2017-10-03 ENCOUNTER — LAB (OUTPATIENT)
Dept: LAB | Facility: HOSPITAL | Age: 42
End: 2017-10-03

## 2017-10-03 ENCOUNTER — OFFICE VISIT (OUTPATIENT)
Dept: ONCOLOGY | Facility: CLINIC | Age: 42
End: 2017-10-03

## 2017-10-03 VITALS
TEMPERATURE: 97.4 F | HEART RATE: 76 BPM | BODY MASS INDEX: 36.64 KG/M2 | DIASTOLIC BLOOD PRESSURE: 65 MMHG | SYSTOLIC BLOOD PRESSURE: 133 MMHG | WEIGHT: 228 LBS | HEIGHT: 66 IN | RESPIRATION RATE: 18 BRPM

## 2017-10-03 DIAGNOSIS — C50.112 MALIGNANT NEOPLASM OF CENTRAL PORTION OF LEFT BREAST IN FEMALE, ESTROGEN RECEPTOR POSITIVE (HCC): Primary | ICD-10-CM

## 2017-10-03 DIAGNOSIS — Z17.0 MALIGNANT NEOPLASM OF CENTRAL PORTION OF LEFT BREAST IN FEMALE, ESTROGEN RECEPTOR POSITIVE (HCC): Primary | ICD-10-CM

## 2017-10-03 DIAGNOSIS — C50.112 MALIGNANT NEOPLASM OF CENTRAL PORTION OF LEFT FEMALE BREAST (HCC): ICD-10-CM

## 2017-10-03 LAB
ALBUMIN SERPL-MCNC: 4.6 G/DL (ref 3.2–4.8)
ALBUMIN/GLOB SERPL: 1.4 G/DL (ref 1.5–2.5)
ALP SERPL-CCNC: 97 U/L (ref 25–100)
ALT SERPL W P-5'-P-CCNC: 31 U/L (ref 7–40)
ANION GAP SERPL CALCULATED.3IONS-SCNC: 9 MMOL/L (ref 3–11)
AST SERPL-CCNC: 31 U/L (ref 0–33)
BILIRUB SERPL-MCNC: 0.8 MG/DL (ref 0.3–1.2)
BUN BLD-MCNC: 13 MG/DL (ref 9–23)
BUN/CREAT SERPL: 14.4 (ref 7–25)
CALCIUM SPEC-SCNC: 9.9 MG/DL (ref 8.7–10.4)
CHLORIDE SERPL-SCNC: 104 MMOL/L (ref 99–109)
CO2 SERPL-SCNC: 23 MMOL/L (ref 20–31)
CREAT BLD-MCNC: 0.9 MG/DL (ref 0.6–1.3)
ERYTHROCYTE [DISTWIDTH] IN BLOOD BY AUTOMATED COUNT: 16 % (ref 11.3–14.5)
GFR SERPL CREATININE-BSD FRML MDRD: 69 ML/MIN/1.73
GLOBULIN UR ELPH-MCNC: 3.2 GM/DL
GLUCOSE BLD-MCNC: 104 MG/DL (ref 70–100)
HCT VFR BLD AUTO: 46.4 % (ref 34.5–44)
HGB BLD-MCNC: 14.8 G/DL (ref 11.5–15.5)
LYMPHOCYTES # BLD AUTO: 1.7 10*3/MM3 (ref 0.6–4.8)
LYMPHOCYTES NFR BLD AUTO: 19.2 % (ref 24–44)
MCH RBC QN AUTO: 28 PG (ref 27–31)
MCHC RBC AUTO-ENTMCNC: 32 G/DL (ref 32–36)
MCV RBC AUTO: 87.7 FL (ref 80–99)
MONOCYTES # BLD AUTO: 0.5 10*3/MM3 (ref 0–1)
MONOCYTES NFR BLD AUTO: 5.6 % (ref 0–12)
NEUTROPHILS # BLD AUTO: 6.6 10*3/MM3 (ref 1.5–8.3)
NEUTROPHILS NFR BLD AUTO: 75.2 % (ref 41–71)
PLATELET # BLD AUTO: 305 10*3/MM3 (ref 150–450)
PMV BLD AUTO: 8 FL (ref 6–12)
POTASSIUM BLD-SCNC: 3.7 MMOL/L (ref 3.5–5.5)
PROT SERPL-MCNC: 7.8 G/DL (ref 5.7–8.2)
RBC # BLD AUTO: 5.29 10*6/MM3 (ref 3.89–5.14)
SODIUM BLD-SCNC: 136 MMOL/L (ref 132–146)
WBC NRBC COR # BLD: 8.8 10*3/MM3 (ref 3.5–10.8)

## 2017-10-03 PROCEDURE — 80053 COMPREHEN METABOLIC PANEL: CPT | Performed by: INTERNAL MEDICINE

## 2017-10-03 PROCEDURE — 36415 COLL VENOUS BLD VENIPUNCTURE: CPT

## 2017-10-03 PROCEDURE — 99214 OFFICE O/P EST MOD 30 MIN: CPT | Performed by: INTERNAL MEDICINE

## 2017-10-03 PROCEDURE — 85025 COMPLETE CBC W/AUTO DIFF WBC: CPT

## 2017-10-03 NOTE — PROGRESS NOTES
PROBLEM LIST:  1. [id number] invasive ductal carcinoma of the left breast, ER positive [place] positive HER-2 negative  A) the patient presented with skin dimpling under the left breast. A needle localized biopsy was done on 2/8/2017. Pathology showed a 2.2 cm invasive ductal carcinoma. There were focally positive margins.   Bilateral mastectomy done on 4/12/17.  Pathology showed residual in situ DICIS.  0/1 SLN involved.  [id number] (Stage IIA).  Oncotype score 34 in the high risk group.  Adjuvant chemotherapy with taxotere and cyclophophamide started 5/9/17.  Long delay between cycle 1 and cycle 2 due to mastectomy wound infection.  Severe allergic reaction after cycle 3 with hives, tightness in throat, and swelling of face that lasted for over 2 weeks despite high dose steroids.  CMF given for cycle #4.    Subjective     HISTORY OF PRESENT ILLNESS:   Mary Ambrosio returns for follow-up.  She says her last cycle of chemotherapy went well.  She really didn't have any side effects.  Currently she notices some tingling in her hands and altered sensation in the bottom of her feet.  She is having occasional migraines about every 2 weeks.  She is taking a muscle relaxer intermittently but otherwise is on no medications right now.  She says she has gained about 40 pounds during her breast cancer treatment.  She was referred to see a counselor regarding body image issues.  She says she doesn't want to go because she doesn't want to talk about it.  She is having hot flashes.    Past Medical History, Past Surgical History, Social History, Family History have been reviewed and are without significant changes except as mentioned.    Review of Systems   A comprehensive 14 point review of systems was performed and was negative except as mentioned.    Medications:  The current medication list was reviewed in the EMR    ALLERGIES:    Allergies   Allergen Reactions   • Taxotere [Docetaxel] Hives and Shortness Of Breath   • Tegaderm  "Ag Mesh [Silver] Rash       Objective      /65 Comment: RUE  Pulse 76  Temp 97.4 °F (36.3 °C) (Temporal Artery )   Resp 18  Ht 66\" (167.6 cm)  Wt 228 lb (103 kg)  BMI 36.8 kg/m2     Performance Status: 0    General: well appearing female in no acute distress  Neuro: alert and oriented  HEENT: sclera anicteric, oropharynx clear  Lymphatics: no cervical, supraclavicular, or axillary adenopathy  Cardiovascular: regular rate and rhythm, no murmurs  Lungs: clear to auscultation bilaterally  Abdomen: soft, nontender, nondistended.  No palpable organomegaly  Extremeties: no lower extremity edema  Skin: No current rashes or urticaria.  Psych: mood and affect appropriate      Labs: CMP is unremarkable    Assessment/Plan   Mary Ambrosio is a 42 y.o. year old female with stage II a ER positive [place] positive HER-2 negative invasive ductal carcinoma of the left breast with a high risk oncotype.  She has completed 4 cycles of chemotherapy.  We discussed options for adjuvant hormonal therapy.  A study from last year showed benefit to ovarian function suppression plus an aromatase inhibitor compared to tamoxifen in premenopausal women.  Tamoxifen is also an effective alternative.  We discussed the side effects and risks of both approaches.  After this discussion she would like to proceed with tamoxifen.  Potential side effects of tamoxifen treatment including an increased risk of blood clots, and increased risk of uterine cancer, hot flashes, vaginal dryness, and sleep or mood disturbance.    She currently is struggling with her weight gain and the changes in her body also bilateral breast cancer.  I encouraged her to follow through with the counseling appointment.  We also discussed that Effexor can be a helpful medication in the setting both for management of depression and also decreasing the severity of hot flashes.    I will see her back in about a month to see how she is tolerating tamoxifen.        "         Visit time was 25 minutes, greater than 50% spent in counseling      Nancy Caruso MD  [facility] Hematology and Oncology    10/3/2017          CC:

## 2017-10-04 ENCOUNTER — TELEPHONE (OUTPATIENT)
Dept: ONCOLOGY | Facility: CLINIC | Age: 42
End: 2017-10-04

## 2017-10-04 RX ORDER — TAMOXIFEN CITRATE 20 MG/1
20 TABLET ORAL DAILY
Qty: 30 TABLET | Refills: 11 | Status: SHIPPED | OUTPATIENT
Start: 2017-10-04 | End: 2018-08-07 | Stop reason: SDUPTHER

## 2017-10-04 NOTE — TELEPHONE ENCOUNTER
----- Message from Keyla Lund sent at 10/4/2017  2:35 PM EDT -----  Regarding: ADY-CHEMO MEDICATION  Contact: [phone]  Patient called to see when her chemo medication will be called in? It needs to be called into [facility] iin [place], [place]?

## 2017-10-12 ENCOUNTER — OFFICE VISIT (OUTPATIENT)
Dept: PSYCHIATRY | Facility: CLINIC | Age: 42
End: 2017-10-12

## 2017-10-12 DIAGNOSIS — F32.A ANXIETY AND DEPRESSION: Primary | ICD-10-CM

## 2017-10-12 DIAGNOSIS — C50.912 MALIGNANT NEOPLASM OF LEFT FEMALE BREAST, UNSPECIFIED ESTROGEN RECEPTOR STATUS, UNSPECIFIED SITE OF BREAST (HCC): ICD-10-CM

## 2017-10-12 DIAGNOSIS — F41.9 ANXIETY AND DEPRESSION: Primary | ICD-10-CM

## 2017-10-12 PROCEDURE — 90791 PSYCH DIAGNOSTIC EVALUATION: CPT | Performed by: PSYCHOLOGIST

## 2017-10-20 NOTE — PROGRESS NOTES
PROGRESS NOTE  Data:  Mary Ambrosio is a 42 y.o. female who met with the undersigned for a regularly scheduled individual outpatient psychotherapy session from 9:00 - 9:45am.     Clinical Maneuvering/Intervention:     Pt talked about struggling with body image problems and feeling very emotional after having had breast cancer. She will be getting reconstructive surgery and has put on weight which the pt explains as very difficult for her because she no longer feels attractive and interested in being intimate with her . The process of going through breast cancer, treatment, surgery and the psychological challenges were discussed. The distress she is feeling was normalized. She was provided insight and education about not only healing physically but also emotionally. Feelings were validated. Perspective changing was conducted in that, the therapist helped her to allow herself permission to feel different things, but also see how she will continue to heal, get reconstruction and it is possible to lose some weight. The pt was tearful in session and got a chance to express many things and feel support and understood by this therapist. She expressed gratitude for today's session.     Assessment      The pt suffers with anxiety, depression, and body image problems. She seems to continue to benefit from counseling as she learns ways to be stronger and better manage these symptoms. She is a good candidate for counseling as she seemed to respond positively to interventions today.     Mental Status Exam  Hygiene:  good  Dress: normal  Attitude:  Cooperative and proactive  Motor Activity:  normal  Speech: normal  Mood:  distressed  Affect:  congruent  Thought Processes:  normal  Thought Content:  appropriate to the situation  Suicidal Thoughts:  not endorsed  Homicidal Thoughts:  not endorsed  Crisis Safety Plan: not needed as of late  Hallucinations:  none     Patient's Support  Network Includes:  , family, friends, medical team, therapist     Progress toward goal: there is evidence to suggest that she is starting to learn new coping skills for stress and motivated to improve the quality of her life     Functional Status: moderate     Prognosis: anxiety and depression     Plan     Pt will practice allowing herself to feel different emotions without assuming that she will get stuck in anxiety/depressive state long term. She is to continue to be open to loved ones who she finds supportive and discuss with them her emotional/psychological struggles in order to feel as supported as possible as she heals (and in turn, feel less distressed).     Dulce Maria Guzman, PhD, LP

## 2017-11-07 ENCOUNTER — OFFICE VISIT (OUTPATIENT)
Dept: ONCOLOGY | Facility: CLINIC | Age: 42
End: 2017-11-07

## 2017-11-07 VITALS
SYSTOLIC BLOOD PRESSURE: 122 MMHG | HEART RATE: 70 BPM | HEIGHT: 66 IN | RESPIRATION RATE: 16 BRPM | DIASTOLIC BLOOD PRESSURE: 60 MMHG | TEMPERATURE: 97.8 F | BODY MASS INDEX: 36.96 KG/M2 | WEIGHT: 230 LBS

## 2017-11-07 DIAGNOSIS — Z17.0 MALIGNANT NEOPLASM OF CENTRAL PORTION OF LEFT BREAST IN FEMALE, ESTROGEN RECEPTOR POSITIVE (HCC): Primary | ICD-10-CM

## 2017-11-07 DIAGNOSIS — C50.112 MALIGNANT NEOPLASM OF CENTRAL PORTION OF LEFT BREAST IN FEMALE, ESTROGEN RECEPTOR POSITIVE (HCC): Primary | ICD-10-CM

## 2017-11-07 PROCEDURE — 99214 OFFICE O/P EST MOD 30 MIN: CPT | Performed by: INTERNAL MEDICINE

## 2017-11-07 RX ORDER — CALCIPOTRIENE AND BETAMETHASONE DIPROPIONATE 50; .5 UG/G; MG/G
SUSPENSION TOPICAL
COMMUNITY
Start: 2017-10-18 | End: 2017-12-12

## 2017-11-07 RX ORDER — VENLAFAXINE HYDROCHLORIDE 37.5 MG/1
37.5 CAPSULE, EXTENDED RELEASE ORAL DAILY
Qty: 30 CAPSULE | Refills: 3 | Status: SHIPPED | OUTPATIENT
Start: 2017-11-07 | End: 2017-12-27 | Stop reason: SDUPTHER

## 2017-11-07 NOTE — PROGRESS NOTES
PROBLEM LIST:  1. [id number] invasive ductal carcinoma of the left breast, ER positive [place] positive HER-2 negative  A) the patient presented with skin dimpling under the left breast. A needle localized biopsy was done on 2/8/2017. Pathology showed a 2.2 cm invasive ductal carcinoma. There were focally positive margins.   Bilateral mastectomy done on 4/12/17.  Pathology showed residual in situ DICIS.  0/1 SLN involved.  [id number] (Stage IIA).  Oncotype score 34 in the high risk group.  Adjuvant chemotherapy with taxotere and cyclophophamide started 5/9/17.  Long delay between cycle 1 and cycle 2 due to mastectomy wound infection.  Severe allergic reaction after cycle 3 with hives, tightness in throat, and swelling of face that lasted for over 2 weeks despite high dose steroids.  CMF given for cycle #4.  B) tamoxifen started October 2017    Subjective     HISTORY OF PRESENT ILLNESS:   Mary Ambrosio returns for follow-up.   She has been taking tamoxifen.  She is having hot flashes.  She also feels achy and stiff in all of her  Joints.  She has some neuropathy affecting both hands and feet and she is worried about not being able to return to her job at [employer].  She has been trying to walk but her feet are painful and she is worried about being able to stand for 10 hours when she returns to work.  She does reports feelings of depression.  She is not sleeping well.      Past Medical History, Past Surgical History, Social History, Family History have been reviewed and are without significant changes except as mentioned.    Review of Systems   A comprehensive 14 point review of systems was performed and was negative except as mentioned.    Medications:  The current medication list was reviewed in the EMR    ALLERGIES:    Allergies   Allergen Reactions   • Taxotere [Docetaxel] Hives and Shortness Of Breath   • Tegaderm Ag Mesh [Silver] Rash       Objective      /60 Comment: RUE  Pulse 70  Temp 97.8 °F (36.6 °C)  "(Temporal Artery )   Resp 16  Ht 66\" (167.6 cm)  Wt 230 lb (104 kg)  BMI 37.12 kg/m2     Performance Status: 0    General: well appearing female in no acute distress  Neuro: alert and oriented  HEENT: sclera anicteric, oropharynx clear  Lymphatics: no cervical, supraclavicular, or axillary adenopathy  Cardiovascular: regular rate and rhythm, no murmurs  Lungs: clear to auscultation bilaterally  Abdomen: soft, nontender, nondistended.  No palpable organomegaly  Extremeties: no lower extremity edema  Skin: No current rashes or urticaria.  Psych: flat affect, teaful, poor eye contact      Labs: CMP is unremarkable    Assessment/Plan   Mary Ambrosio is a 42 y.o. year old female with stage II a ER positive [place] positive HER-2 negative invasive ductal carcinoma of the left breast with a high risk oncotype.  She has started tamoxifen and is having some difficulty with joint pains and hot flashes.  Overall she is stressed and frustrated with not feeling well, neuropathy symptoms, concerns about her job, and weight gain.   I do think she would benefit from regular exercise.  I think she also is experiencing some anxiety and depression which is common in this setting.  I believe many of her current symptoms will improve with time.  We will try starting effexor at a low dose and I will see her back in 6 weeks to see how she is doing with it.                Visit time was 25 minutes, greater than 50% spent in counseling      Nancy Caruso MD  [facility] Hematology and Oncology    11/7/2017          CC:          "

## 2017-12-12 ENCOUNTER — ANESTHESIA EVENT (OUTPATIENT)
Dept: PERIOP | Facility: HOSPITAL | Age: 42
End: 2017-12-12

## 2017-12-13 ENCOUNTER — ANESTHESIA (OUTPATIENT)
Dept: PERIOP | Facility: HOSPITAL | Age: 42
End: 2017-12-13

## 2017-12-13 ENCOUNTER — HOSPITAL ENCOUNTER (OUTPATIENT)
Facility: HOSPITAL | Age: 42
Setting detail: SURGERY ADMIT
Discharge: HOME | End: 2017-12-13
Attending: FAMILY MEDICINE | Admitting: FAMILY MEDICINE

## 2017-12-13 VITALS
BODY MASS INDEX: 36.16 KG/M2 | HEIGHT: 66 IN | WEIGHT: 225 LBS | RESPIRATION RATE: 18 BRPM | HEART RATE: 67 BPM | OXYGEN SATURATION: 91 % | DIASTOLIC BLOOD PRESSURE: 73 MMHG | TEMPERATURE: 98 F | SYSTOLIC BLOOD PRESSURE: 142 MMHG

## 2017-12-13 DIAGNOSIS — C50.919 BREAST CANCER (HCC): ICD-10-CM

## 2017-12-13 LAB
B-HCG UR QL: NEGATIVE
DEPRECATED RDW RBC AUTO: 38.5 FL (ref 37–54)
ERYTHROCYTE [DISTWIDTH] IN BLOOD BY AUTOMATED COUNT: 12.4 % (ref 11.3–14.5)
HCT VFR BLD AUTO: 40.9 % (ref 34.5–44)
HGB BLD-MCNC: 13.6 G/DL (ref 11.5–15.5)
INTERNAL NEGATIVE CONTROL: NORMAL
INTERNAL POSITIVE CONTROL: REACTIVE
Lab: NORMAL
MCH RBC QN AUTO: 28.2 PG (ref 27–31)
MCHC RBC AUTO-ENTMCNC: 33.3 G/DL (ref 32–36)
MCV RBC AUTO: 84.9 FL (ref 80–99)
PLATELET # BLD AUTO: 261 10*3/MM3 (ref 150–450)
PMV BLD AUTO: 9.6 FL (ref 6–12)
RBC # BLD AUTO: 4.82 10*6/MM3 (ref 3.89–5.14)
WBC NRBC COR # BLD: 6.88 10*3/MM3 (ref 3.5–10.8)

## 2017-12-13 PROCEDURE — 25010000002 ONDANSETRON PER 1 MG: Performed by: NURSE ANESTHETIST, CERTIFIED REGISTERED

## 2017-12-13 PROCEDURE — 88305 TISSUE EXAM BY PATHOLOGIST: CPT | Performed by: FAMILY MEDICINE

## 2017-12-13 PROCEDURE — 25010000002 DEXAMETHASONE PER 1 MG: Performed by: NURSE ANESTHETIST, CERTIFIED REGISTERED

## 2017-12-13 PROCEDURE — 25010000002 PROPOFOL 1000 MG/ML EMULSION: Performed by: NURSE ANESTHETIST, CERTIFIED REGISTERED

## 2017-12-13 PROCEDURE — 25010000003 CEFAZOLIN IN DEXTROSE 2-4 GM/100ML-% SOLUTION: Performed by: FAMILY MEDICINE

## 2017-12-13 PROCEDURE — 85027 COMPLETE CBC AUTOMATED: CPT | Performed by: ANESTHESIOLOGY

## 2017-12-13 PROCEDURE — 25010000002 HYDROMORPHONE PER 4 MG: Performed by: NURSE ANESTHETIST, CERTIFIED REGISTERED

## 2017-12-13 PROCEDURE — 25010000002 PROPOFOL 10 MG/ML EMULSION: Performed by: NURSE ANESTHETIST, CERTIFIED REGISTERED

## 2017-12-13 PROCEDURE — C1789 PROSTHESIS, BREAST, IMP: HCPCS | Performed by: FAMILY MEDICINE

## 2017-12-13 PROCEDURE — 25010000002 NEOSTIGMINE PER 0.5 MG: Performed by: NURSE ANESTHETIST, CERTIFIED REGISTERED

## 2017-12-13 PROCEDURE — 25010000002 MIDAZOLAM PER 1 MG: Performed by: ANESTHESIOLOGY

## 2017-12-13 PROCEDURE — 25010000002 EPINEPHRINE PER 0.1 MG: Performed by: FAMILY MEDICINE

## 2017-12-13 PROCEDURE — 25010000002 DEXAMETHASONE SODIUM PHOSPHATE 10 MG/ML SOLUTION 1 ML VIAL: Performed by: NURSE ANESTHETIST, CERTIFIED REGISTERED

## 2017-12-13 DEVICE — IMPLANTABLE DEVICE: Type: IMPLANTABLE DEVICE | Site: BREAST | Status: FUNCTIONAL

## 2017-12-13 RX ORDER — CEFAZOLIN SODIUM 2 G/100ML
2 INJECTION, SOLUTION INTRAVENOUS ONCE
Status: COMPLETED | OUTPATIENT
Start: 2017-12-13 | End: 2017-12-13

## 2017-12-13 RX ORDER — PROMETHAZINE HYDROCHLORIDE 25 MG/ML
6.25 INJECTION, SOLUTION INTRAMUSCULAR; INTRAVENOUS ONCE AS NEEDED
Status: DISCONTINUED | OUTPATIENT
Start: 2017-12-13 | End: 2017-12-13 | Stop reason: HOSPADM

## 2017-12-13 RX ORDER — ONDANSETRON 2 MG/ML
INJECTION INTRAMUSCULAR; INTRAVENOUS AS NEEDED
Status: DISCONTINUED | OUTPATIENT
Start: 2017-12-13 | End: 2017-12-13 | Stop reason: SURG

## 2017-12-13 RX ORDER — ONDANSETRON 2 MG/ML
4 INJECTION INTRAMUSCULAR; INTRAVENOUS ONCE AS NEEDED
Status: COMPLETED | OUTPATIENT
Start: 2017-12-13 | End: 2017-12-13

## 2017-12-13 RX ORDER — HYDROCODONE BITARTRATE AND ACETAMINOPHEN 5; 325 MG/1; MG/1
1 TABLET ORAL ONCE AS NEEDED
Status: DISCONTINUED | OUTPATIENT
Start: 2017-12-13 | End: 2017-12-13 | Stop reason: HOSPADM

## 2017-12-13 RX ORDER — MIDAZOLAM HYDROCHLORIDE 1 MG/ML
2 INJECTION INTRAMUSCULAR; INTRAVENOUS ONCE AS NEEDED
Status: COMPLETED | OUTPATIENT
Start: 2017-12-13 | End: 2017-12-13

## 2017-12-13 RX ORDER — IBUPROFEN 600 MG/1
600 TABLET ORAL EVERY 6 HOURS PRN
Qty: 30 TABLET | Refills: 0 | Status: SHIPPED | OUTPATIENT
Start: 2017-12-13 | End: 2017-12-27

## 2017-12-13 RX ORDER — SODIUM CHLORIDE 9 MG/ML
INJECTION, SOLUTION INTRAVENOUS AS NEEDED
Status: DISCONTINUED | OUTPATIENT
Start: 2017-12-13 | End: 2017-12-13 | Stop reason: HOSPADM

## 2017-12-13 RX ORDER — ACETAMINOPHEN 325 MG/1
650 TABLET ORAL EVERY 4 HOURS PRN
Qty: 60 TABLET | Refills: 0 | Status: SHIPPED | OUTPATIENT
Start: 2017-12-13 | End: 2017-12-27

## 2017-12-13 RX ORDER — PROMETHAZINE HYDROCHLORIDE 25 MG/1
25 TABLET ORAL ONCE AS NEEDED
Status: DISCONTINUED | OUTPATIENT
Start: 2017-12-13 | End: 2017-12-13 | Stop reason: HOSPADM

## 2017-12-13 RX ORDER — PREGABALIN 75 MG/1
75 CAPSULE ORAL ONCE
Status: COMPLETED | OUTPATIENT
Start: 2017-12-13 | End: 2017-12-13

## 2017-12-13 RX ORDER — LIDOCAINE HYDROCHLORIDE 10 MG/ML
0.5 INJECTION, SOLUTION EPIDURAL; INFILTRATION; INTRACAUDAL; PERINEURAL ONCE AS NEEDED
Status: COMPLETED | OUTPATIENT
Start: 2017-12-13 | End: 2017-12-13

## 2017-12-13 RX ORDER — FAMOTIDINE 20 MG/1
20 TABLET, FILM COATED ORAL ONCE
Status: COMPLETED | OUTPATIENT
Start: 2017-12-13 | End: 2017-12-13

## 2017-12-13 RX ORDER — GLYCOPYRROLATE 0.2 MG/ML
INJECTION INTRAMUSCULAR; INTRAVENOUS AS NEEDED
Status: DISCONTINUED | OUTPATIENT
Start: 2017-12-13 | End: 2017-12-13 | Stop reason: SURG

## 2017-12-13 RX ORDER — ACETAMINOPHEN 500 MG
1000 TABLET ORAL ONCE
Status: COMPLETED | OUTPATIENT
Start: 2017-12-13 | End: 2017-12-13

## 2017-12-13 RX ORDER — DEXAMETHASONE SODIUM PHOSPHATE 4 MG/ML
INJECTION, SOLUTION INTRA-ARTICULAR; INTRALESIONAL; INTRAMUSCULAR; INTRAVENOUS; SOFT TISSUE AS NEEDED
Status: DISCONTINUED | OUTPATIENT
Start: 2017-12-13 | End: 2017-12-13 | Stop reason: SURG

## 2017-12-13 RX ORDER — PROPOFOL 10 MG/ML
VIAL (ML) INTRAVENOUS AS NEEDED
Status: DISCONTINUED | OUTPATIENT
Start: 2017-12-13 | End: 2017-12-13 | Stop reason: SURG

## 2017-12-13 RX ORDER — SODIUM CHLORIDE, SODIUM LACTATE, POTASSIUM CHLORIDE, CALCIUM CHLORIDE 600; 310; 30; 20 MG/100ML; MG/100ML; MG/100ML; MG/100ML
9 INJECTION, SOLUTION INTRAVENOUS CONTINUOUS
Status: DISCONTINUED | OUTPATIENT
Start: 2017-12-13 | End: 2017-12-13 | Stop reason: HOSPADM

## 2017-12-13 RX ORDER — FENTANYL CITRATE 50 UG/ML
50 INJECTION, SOLUTION INTRAMUSCULAR; INTRAVENOUS
Status: DISCONTINUED | OUTPATIENT
Start: 2017-12-13 | End: 2017-12-13 | Stop reason: HOSPADM

## 2017-12-13 RX ORDER — SODIUM CHLORIDE 0.9 % (FLUSH) 0.9 %
1-10 SYRINGE (ML) INJECTION AS NEEDED
Status: DISCONTINUED | OUTPATIENT
Start: 2017-12-13 | End: 2017-12-13

## 2017-12-13 RX ORDER — FAMOTIDINE 10 MG/ML
20 INJECTION, SOLUTION INTRAVENOUS ONCE
Status: DISCONTINUED | OUTPATIENT
Start: 2017-12-13 | End: 2017-12-13

## 2017-12-13 RX ORDER — ATRACURIUM BESYLATE 10 MG/ML
INJECTION, SOLUTION INTRAVENOUS AS NEEDED
Status: DISCONTINUED | OUTPATIENT
Start: 2017-12-13 | End: 2017-12-13 | Stop reason: SURG

## 2017-12-13 RX ORDER — OXYCODONE HYDROCHLORIDE 5 MG/1
5 TABLET ORAL EVERY 4 HOURS PRN
Qty: 30 TABLET | Refills: 0 | Status: SHIPPED | OUTPATIENT
Start: 2017-12-13 | End: 2017-12-27

## 2017-12-13 RX ORDER — CELECOXIB 200 MG/1
200 CAPSULE ORAL ONCE
Status: COMPLETED | OUTPATIENT
Start: 2017-12-13 | End: 2017-12-13

## 2017-12-13 RX ORDER — LIDOCAINE HYDROCHLORIDE 10 MG/ML
INJECTION, SOLUTION INFILTRATION; PERINEURAL AS NEEDED
Status: DISCONTINUED | OUTPATIENT
Start: 2017-12-13 | End: 2017-12-13 | Stop reason: SURG

## 2017-12-13 RX ORDER — PROMETHAZINE HYDROCHLORIDE 25 MG/1
25 SUPPOSITORY RECTAL ONCE AS NEEDED
Status: DISCONTINUED | OUTPATIENT
Start: 2017-12-13 | End: 2017-12-13 | Stop reason: HOSPADM

## 2017-12-13 RX ORDER — HYDROMORPHONE HYDROCHLORIDE 1 MG/ML
0.5 INJECTION, SOLUTION INTRAMUSCULAR; INTRAVENOUS; SUBCUTANEOUS
Status: DISCONTINUED | OUTPATIENT
Start: 2017-12-13 | End: 2017-12-13 | Stop reason: HOSPADM

## 2017-12-13 RX ADMIN — CEFAZOLIN SODIUM 2 G: 2 INJECTION, SOLUTION INTRAVENOUS at 09:00

## 2017-12-13 RX ADMIN — PREGABALIN 75 MG: 75 CAPSULE ORAL at 08:55

## 2017-12-13 RX ADMIN — MIDAZOLAM HYDROCHLORIDE 2 MG: 1 INJECTION, SOLUTION INTRAMUSCULAR; INTRAVENOUS at 09:00

## 2017-12-13 RX ADMIN — CELECOXIB 200 MG: 200 CAPSULE ORAL at 08:54

## 2017-12-13 RX ADMIN — LIDOCAINE HYDROCHLORIDE 0.2 ML: 10 INJECTION, SOLUTION EPIDURAL; INFILTRATION; INTRACAUDAL; PERINEURAL at 07:48

## 2017-12-13 RX ADMIN — ACETAMINOPHEN 1000 MG: 500 TABLET ORAL at 08:54

## 2017-12-13 RX ADMIN — GLYCOPYRROLATE 0.4 MG: 0.2 INJECTION, SOLUTION INTRAMUSCULAR; INTRAVENOUS at 11:00

## 2017-12-13 RX ADMIN — DEXAMETHASONE SODIUM PHOSPHATE 60 ML: 10 INJECTION, SOLUTION INTRAMUSCULAR; INTRAVENOUS at 09:14

## 2017-12-13 RX ADMIN — PROPOFOL 50 MCG/KG/MIN: 10 INJECTION, EMULSION INTRAVENOUS at 09:11

## 2017-12-13 RX ADMIN — HYDROMORPHONE HYDROCHLORIDE 0.5 MG: 1 INJECTION, SOLUTION INTRAMUSCULAR; INTRAVENOUS; SUBCUTANEOUS at 11:30

## 2017-12-13 RX ADMIN — LIDOCAINE HYDROCHLORIDE 50 MG: 10 INJECTION, SOLUTION INFILTRATION; PERINEURAL at 09:07

## 2017-12-13 RX ADMIN — HYDROMORPHONE HYDROCHLORIDE 0.5 MG: 1 INJECTION, SOLUTION INTRAMUSCULAR; INTRAVENOUS; SUBCUTANEOUS at 11:40

## 2017-12-13 RX ADMIN — PROPOFOL 200 MG: 10 INJECTION, EMULSION INTRAVENOUS at 09:07

## 2017-12-13 RX ADMIN — FAMOTIDINE 20 MG: 20 TABLET, FILM COATED ORAL at 07:59

## 2017-12-13 RX ADMIN — ONDANSETRON 4 MG: 2 INJECTION INTRAMUSCULAR; INTRAVENOUS at 12:40

## 2017-12-13 RX ADMIN — DEXAMETHASONE SODIUM PHOSPHATE 4 MG: 4 INJECTION, SOLUTION INTRAMUSCULAR; INTRAVENOUS at 09:18

## 2017-12-13 RX ADMIN — Medication 3 MG: at 11:00

## 2017-12-13 RX ADMIN — ATRACURIUM BESYLATE 40 MG: 10 INJECTION, SOLUTION INTRAVENOUS at 09:07

## 2017-12-13 RX ADMIN — SODIUM CHLORIDE, POTASSIUM CHLORIDE, SODIUM LACTATE AND CALCIUM CHLORIDE: 600; 310; 30; 20 INJECTION, SOLUTION INTRAVENOUS at 10:54

## 2017-12-13 RX ADMIN — SODIUM CHLORIDE, POTASSIUM CHLORIDE, SODIUM LACTATE AND CALCIUM CHLORIDE 9 ML/HR: 600; 310; 30; 20 INJECTION, SOLUTION INTRAVENOUS at 07:48

## 2017-12-13 RX ADMIN — ONDANSETRON 4 MG: 2 INJECTION INTRAMUSCULAR; INTRAVENOUS at 10:51

## 2017-12-13 NOTE — ANESTHESIA PROCEDURE NOTES
Peripheral Block    Patient location during procedure: OR  Start time: 12/13/2017 9:10 AM  Stop time: 12/13/2017 9:14 AM  Reason for block: at surgeon's request and post-op pain management  Performed by  Anesthesiologist: ARIAS POWERS  CRNA: KAREL KURTZ  Preanesthetic Checklist  Completed: patient identified, site marked, surgical consent, pre-op evaluation, timeout performed, IV checked, risks and benefits discussed and monitors and equipment checked  Prep:  Pt Position: supine  Sterile barriers:cap, gloves, gown and mask  Prep: ChloraPrep  Patient monitoring: blood pressure monitoring, continuous pulse oximetry and EKG  Procedure  Sedation:yes  Performed under: general  Guidance:ultrasound guided and landmark technique  Images:still images obtained    Laterality:Bilateral  Block Type:PECS I and PECS II  Injection Technique:single-shot  Needle Type:short-bevel  Needle Gauge:20 G    Medications  Preservative Free Saline:10ml  Comment:Block Injection:  Total volume of LA divided between Right and Left sided blocks       Adjuncts:  Decadron 4 mg PSF  Local Injected:bupivacaine 0.25% Local Amount Injected:60mL  Post Assessment  Injection Assessment: negative aspiration for heme and incremental injection  Patient Tolerance:comfortable throughout block  Complications:no  Additional Notes  The pt. Was placed in the Supine Position and GA was induced     The insertion site was prepped with CHG and Ultrasound guidance with In-Plane techniquewas  a 4inch BBraun 360 degree echogenic needle was visualized.  Normal Saline PSF was  utilized for hydrodissection of tissue. PECS 1 Block- Pectoralis Major and Minor where identified and LA was injected between PMM and PmM at the level of the 3rd Rib(10ml),  PECS 2-  Pectoralis Minor and Serratus muscle where identified and the needle was advanced laterally in-plane with the 4th rib as a backstop, pleura was monitored.  LA was injected between SA and PmM at the level of 4th  rib( 20ml).  LA injection spread was visualized, injection was incremental 1-5ml, normal or low injection pressure, no intravascular injection, no pneumothorax appreciated.  Thank You.

## 2017-12-13 NOTE — INTERVAL H&P NOTE
H&P reviewed. The patient was examined and there are no changes to the H&P.     Heart: RRR  Lungs: CTAB    Immunizations:    Tetanus: Unknown     Influenza: No     Pneumo: No    Labs were reviewed.       Plan: Exchange Bilateral Tissue Expanders to permanent       The risks benefits and alternatives to bilateral exchange of expanders to implants and bilateral breast revision were discussed with the patient who verbalizes full understanding and wishes to proceed.

## 2017-12-13 NOTE — ANESTHESIA PROCEDURE NOTES
Airway  Urgency: elective    Date/Time: 12/13/2017 9:09 AM  Airway not difficult    General Information and Staff    Patient location during procedure: OR  CRNA: KAREL KURTZ    Indications and Patient Condition  Indications for airway management: airway protection    Preoxygenated: yes  MILS not maintained throughout  Mask difficulty assessment: 1 - vent by mask    Final Airway Details  Final airway type: endotracheal airway      Successful airway: ETT  Cuffed: yes   Successful intubation technique: direct laryngoscopy  Facilitating devices/methods: intubating stylet  Endotracheal tube insertion site: oral  Blade: Olvin  Blade size: #3  ETT size: 7.0 mm  Cormack-Lehane Classification: grade I - full view of glottis  Placement verified by: chest auscultation and capnometry   Cuff volume (mL): 8  Measured from: teeth  ETT to teeth (cm): 21  Number of attempts at approach: 1    Additional Comments  Smooth IV induction.  Atraumatic ET intubation.  Dentition unchanged.  Negative epigastric sounds, Breath sound equal bilaterally with symmetric chest rise and fall

## 2017-12-13 NOTE — ANESTHESIA PREPROCEDURE EVALUATION
Anesthesia Evaluation     Patient summary reviewed and Nursing notes reviewed   no history of anesthetic complications:  NPO Solid Status: > 8 hours  NPO Liquid Status: > 8 hours     Airway   Mallampati: II  TM distance: >3 FB  Neck ROM: full  no difficulty expected  Dental - normal exam     Pulmonary - negative pulmonary ROS and normal exam    breath sounds clear to auscultation  Cardiovascular - negative cardio ROS and normal exam    Rhythm: regular  Rate: normal        Neuro/Psych- negative ROS  GI/Hepatic/Renal/Endo - negative ROS     Musculoskeletal (-) negative ROS    Abdominal    Substance History      OB/GYN negative ob/gyn ROS         Other      history of cancer (breast s/p mastectomy + chemo)                                    Anesthesia Plan    ASA 3     general     intravenous induction   Anesthetic plan and risks discussed with patient.    Plan discussed with CRNA.

## 2017-12-13 NOTE — ANESTHESIA POSTPROCEDURE EVALUATION
"Patient: Mary Ambrosio    Procedure Summary     Date Anesthesia Start Anesthesia Stop Room / Location    12/13/17 0900 1115 [facility] / [facility]       Procedure Diagnosis Surgeon Provider    EXCHANGE BILATERAL TISSUE EXPANDERS  TO PERMANENT IMPLANTS (Bilateral Breast) No diagnosis on file. MD Ronald Monson MD          Anesthesia Type: general  Last vitals  BP   126/76 (Simultaneous filing. User may not have seen previous data.) (12/13/17 1114)   Temp   97.3 °F (36.3 °C) (Simultaneous filing. User may not have seen previous data.) (12/13/17 1114)   Pulse   90 (Simultaneous filing. User may not have seen previous data.) (12/13/17 1114)   Resp   20 (Simultaneous filing. User may not have seen previous data.) (12/13/17 1114)     SpO2   96 % (Simultaneous filing. User may not have seen previous data.) (12/13/17 1114)     Post Anesthesia Care and Evaluation    Patient location during evaluation: PACU  Patient participation: complete - patient participated  Level of consciousness: awake and alert  Pain score: 0  Pain management: adequate  Airway patency: patent  Anesthetic complications: No anesthetic complications  PONV Status: none  Cardiovascular status: hemodynamically stable and acceptable  Respiratory status: nonlabored ventilation, acceptable and nasal cannula  Hydration status: acceptable    Comments: Pt transported to PACU with O2 via nasal cannula at 4 L/MIN. Vital signs stable.  Pt shows no signs of distress.  Report given to PACU RN. /76 (BP Location: Right leg) Comment: Simultaneous filing. User may not have seen previous data.  Pulse 90 Comment: Simultaneous filing. User may not have seen previous data.  Temp 97.3 °F (36.3 °C) (Tympanic)  Comment: Simultaneous filing. User may not have seen previous data. Comment (Src): Simultaneous filing. User may not have seen previous data.  Resp 20 Comment: Simultaneous filing. User may not have seen previous data.  Ht 167.6 cm (66\") "  Wt 102 kg (225 lb)  SpO2 96% Comment: Simultaneous filing. User may not have seen previous data.  Breastfeeding? No  BMI 36.32 kg/m2

## 2017-12-13 NOTE — OP NOTE
Mary Ambrosio  [id number]    Date of Procedure: 12/13/17    Preoperative Diagnosis: Acquired Absence of Bilateral Breasts, personal history of breast cancer    Postoperative Diagnosis: same    Procedure: Bilateral exchange of expanders to permanent implants, bilateral revision of reconstructed breasts.    Surgeon: Kym Burnett MD    Assistant: SIVA Carlson    Anesthesia: General    Estimated Blood Loss: less than 10ml    Complications: none      HPI/Indications: The patient is a 42 year old with a history of breast cancer who underwent bilateral mastectomies for cancer. She has undergone expander placement and full expansion and presents now for exchange of tissue expanders to implants. She has inferolateral displacement of the bilateral expanders and also presents for revision of the bilateral reconstructed breasts to improve the positioning. The risks, benefits and alternatives to this procedure were discussed with the patient who verbalizes full understanding and wishes to proceed.      Description of the procedure/findings: The patient was brought to the operating room and placed supine on the operating room table. After general endotracheal anesthesia and pecs block was established, she was prepped and draped sterilely. 1:[id number] epinephrine was placed in the prior mastectomy scars and the planned areas of excision of redundant inferolateral skin excess. The right breast mastectomy scar was incised and the expander deflated and removed. The capsule underwent capsulotomy and capsulectomy and capsulorrhaphy to close down the lateral pockets and the pocket was irrigated with saline irrigation. Sizer confirmed implant size and then using a sheehan funnel and no touch technique, an implant was placed, 800ml silicone, the incision was closed with capsular and deep dermal closure with 3-0 vicryl, a running subcuticular 4-0 Monocryl. In identical fashion, left breast exchange and capsulotomy was performed and an  800ml silicone implant placed and closure performed. .The patient tolerated the procedure well, was dressed in a postsurgical bra and awakened in the operating room in good condition. All needle, sponge, and instrument counts were correct at the completion of the procedure.

## 2017-12-14 LAB
LAB AP CASE REPORT: NORMAL
LAB AP CLINICAL INFORMATION: NORMAL
Lab: NORMAL
PATH REPORT.FINAL DX SPEC: NORMAL
PATH REPORT.GROSS SPEC: NORMAL

## 2017-12-27 ENCOUNTER — LAB (OUTPATIENT)
Dept: LAB | Facility: HOSPITAL | Age: 42
End: 2017-12-27

## 2017-12-27 ENCOUNTER — OFFICE VISIT (OUTPATIENT)
Dept: ONCOLOGY | Facility: CLINIC | Age: 42
End: 2017-12-27

## 2017-12-27 VITALS
SYSTOLIC BLOOD PRESSURE: 108 MMHG | RESPIRATION RATE: 18 BRPM | WEIGHT: 226 LBS | HEIGHT: 66 IN | BODY MASS INDEX: 36.32 KG/M2 | DIASTOLIC BLOOD PRESSURE: 64 MMHG | HEART RATE: 72 BPM

## 2017-12-27 DIAGNOSIS — C50.112 MALIGNANT NEOPLASM OF CENTRAL PORTION OF LEFT FEMALE BREAST, UNSPECIFIED ESTROGEN RECEPTOR STATUS (HCC): Primary | ICD-10-CM

## 2017-12-27 DIAGNOSIS — C50.112 MALIGNANT NEOPLASM OF CENTRAL PORTION OF LEFT BREAST IN FEMALE, ESTROGEN RECEPTOR POSITIVE (HCC): Primary | ICD-10-CM

## 2017-12-27 DIAGNOSIS — Z17.0 MALIGNANT NEOPLASM OF CENTRAL PORTION OF LEFT BREAST IN FEMALE, ESTROGEN RECEPTOR POSITIVE (HCC): Primary | ICD-10-CM

## 2017-12-27 LAB
ALBUMIN SERPL-MCNC: 4.5 G/DL (ref 3.2–4.8)
ALBUMIN/GLOB SERPL: 1.6 G/DL (ref 1.5–2.5)
ALP SERPL-CCNC: 82 U/L (ref 25–100)
ALT SERPL W P-5'-P-CCNC: 32 U/L (ref 7–40)
ANION GAP SERPL CALCULATED.3IONS-SCNC: 8 MMOL/L (ref 3–11)
ARTICHOKE IGE QN: 153 MG/DL (ref 0–130)
AST SERPL-CCNC: 28 U/L (ref 0–33)
BILIRUB SERPL-MCNC: 0.3 MG/DL (ref 0.3–1.2)
BUN BLD-MCNC: 11 MG/DL (ref 9–23)
BUN/CREAT SERPL: 13.8 (ref 7–25)
CALCIUM SPEC-SCNC: 9.5 MG/DL (ref 8.7–10.4)
CHLORIDE SERPL-SCNC: 107 MMOL/L (ref 99–109)
CHOLEST SERPL-MCNC: 253 MG/DL (ref 0–200)
CO2 SERPL-SCNC: 25 MMOL/L (ref 20–31)
CREAT BLD-MCNC: 0.8 MG/DL (ref 0.6–1.3)
ERYTHROCYTE [DISTWIDTH] IN BLOOD BY AUTOMATED COUNT: 12.5 % (ref 11.3–14.5)
GFR SERPL CREATININE-BSD FRML MDRD: 79 ML/MIN/1.73
GLOBULIN UR ELPH-MCNC: 2.8 GM/DL
GLUCOSE BLD-MCNC: 79 MG/DL (ref 70–100)
HCG INTACT+B SERPL-ACNC: <5 MIU/ML
HCT VFR BLD AUTO: 42.3 % (ref 34.5–44)
HDLC SERPL-MCNC: 47 MG/DL (ref 40–60)
HGB BLD-MCNC: 13.5 G/DL (ref 11.5–15.5)
LYMPHOCYTES # BLD AUTO: 2.2 10*3/MM3 (ref 0.6–4.8)
LYMPHOCYTES NFR BLD AUTO: 24.6 % (ref 24–44)
MCH RBC QN AUTO: 27 PG (ref 27–31)
MCHC RBC AUTO-ENTMCNC: 31.8 G/DL (ref 32–36)
MCV RBC AUTO: 84.7 FL (ref 80–99)
MONOCYTES # BLD AUTO: 0.6 10*3/MM3 (ref 0–1)
MONOCYTES NFR BLD AUTO: 6.9 % (ref 0–12)
NEUTROPHILS # BLD AUTO: 6.2 10*3/MM3 (ref 1.5–8.3)
NEUTROPHILS NFR BLD AUTO: 68.5 % (ref 41–71)
PLATELET # BLD AUTO: 333 10*3/MM3 (ref 150–450)
PMV BLD AUTO: 8.7 FL (ref 6–12)
POTASSIUM BLD-SCNC: 4.1 MMOL/L (ref 3.5–5.5)
PROT SERPL-MCNC: 7.3 G/DL (ref 5.7–8.2)
RBC # BLD AUTO: 5 10*6/MM3 (ref 3.89–5.14)
SODIUM BLD-SCNC: 140 MMOL/L (ref 132–146)
TRIGL SERPL-MCNC: 363 MG/DL (ref 0–150)
WBC NRBC COR # BLD: 9.1 10*3/MM3 (ref 3.5–10.8)

## 2017-12-27 PROCEDURE — 80053 COMPREHEN METABOLIC PANEL: CPT | Performed by: INTERNAL MEDICINE

## 2017-12-27 PROCEDURE — 84702 CHORIONIC GONADOTROPIN TEST: CPT

## 2017-12-27 PROCEDURE — 99214 OFFICE O/P EST MOD 30 MIN: CPT | Performed by: INTERNAL MEDICINE

## 2017-12-27 PROCEDURE — S0354 CANCER TREATMENT PLAN CHANGE: HCPCS | Performed by: INTERNAL MEDICINE

## 2017-12-27 PROCEDURE — 80061 LIPID PANEL: CPT | Performed by: INTERNAL MEDICINE

## 2017-12-27 PROCEDURE — 36415 COLL VENOUS BLD VENIPUNCTURE: CPT | Performed by: INTERNAL MEDICINE

## 2017-12-27 PROCEDURE — 85025 COMPLETE CBC W/AUTO DIFF WBC: CPT | Performed by: INTERNAL MEDICINE

## 2017-12-27 RX ORDER — VENLAFAXINE HYDROCHLORIDE 75 MG/1
75 CAPSULE, EXTENDED RELEASE ORAL DAILY
Qty: 30 CAPSULE | Refills: 5 | Status: SHIPPED | OUTPATIENT
Start: 2017-12-27 | End: 2018-07-20 | Stop reason: SDUPTHER

## 2017-12-27 RX ORDER — SUMATRIPTAN 50 MG/1
TABLET, FILM COATED ORAL
Qty: 5 TABLET | Refills: 1 | Status: SHIPPED | OUTPATIENT
Start: 2017-12-27 | End: 2018-08-14

## 2017-12-27 NOTE — PROGRESS NOTES
PROBLEM LIST:  1. [id number] invasive ductal carcinoma of the left breast, ER positive [place] positive HER-2 negative  A) the patient presented with skin dimpling under the left breast. A needle localized biopsy was done on 2/8/2017. Pathology showed a 2.2 cm invasive ductal carcinoma. There were focally positive margins.   Bilateral mastectomy done on 4/12/17.  Pathology showed residual in situ DICIS.  0/1 SLN involved.  [id number] (Stage IIA).  Oncotype score 34 in the high risk group.  Adjuvant chemotherapy with taxotere and cyclophophamide started 5/9/17.  Long delay between cycle 1 and cycle 2 due to mastectomy wound infection.  Severe allergic reaction after cycle 3 with hives, tightness in throat, and swelling of face that lasted for over 2 weeks despite high dose steroids.  CMF given for cycle #4.  B) tamoxifen started October 2017    Subjective     HISTORY OF PRESENT ILLNESS:   Mary Ambrosio returns for follow-up.  She had her permanent implants placed on 12/13/17.      She says she is feeling better than her last visit.  She thinks the effexor has helped with her mood.  The neuropathy may be slightly improved, but still bothers her in her feet and also in her hands after using them.  She continues to have significant hot flashes.  They are somewhat less frequent since starting effexor but they still are all day and all night.      She mentions a fullness in her right neck - she notices it mainly when she turns her head.    Past Medical History, Past Surgical History, Social History, Family History have been reviewed and are without significant changes except as mentioned.    Review of Systems   A comprehensive 14 point review of systems was performed and was negative except as mentioned.    Medications:  The current medication list was reviewed in the EMR    ALLERGIES:    Allergies   Allergen Reactions   • Taxotere [Docetaxel] Hives and Shortness Of Breath   • Tegaderm Ag Mesh [Silver] Rash       Objective  "     /64  Pulse 72  Resp 18  Ht 167.6 cm (66\")  Wt 103 kg (226 lb)  BMI 36.48 kg/m2     Performance Status: 0    General: well appearing female in no acute distress  Neuro: alert and oriented  HEENT: sclera anicteric, oropharynx clear  Lymphatics: no cervical, supraclavicular, or axillary adenopathy.  The the area in the right neck she is noticing there is subtle fullness, no palpable mass.  Cardiovascular: regular rate and rhythm, no murmurs  Lungs: clear to auscultation bilaterally  Abdomen: soft, nontender, nondistended.  No palpable organomegaly  Extremeties: no lower extremity edema  Skin: No current rashes or urticaria.  Psych: flat affect, teaful, poor eye contact        Assessment/Plan   Mary Ambrosio is a 42 y.o. year old female with stage II a ER positive SD positive HER-2 negative invasive ductal carcinoma of the left breast with a high risk oncotype.     Breast cancer: continue tamoxifen.  She is experiencing hot flashes.  She is eligible for , looking at adding affinitor to tamoxifen.  She will meet with the research coordinator today.    Hot flashes:  We will try increasing effexor to 75 mg daily.  If not effective, we may consider gabapentin.    Neuropathy: some slow improvement.  Consider gabapentin if doesn't continue to improve.    Right neck fullness: may be related to current URI.  She will call if this has not resolved in a month and we will re-examine or consider imaging.                Visit time was 25 minutes, greater than 50% spent in counseling      Nancy Caruso MD  [facility] Hematology and Oncology    12/27/2017          CC:          "

## 2018-01-10 ENCOUNTER — RESEARCH ENCOUNTER (OUTPATIENT)
Dept: OTHER | Facility: OTHER | Age: 43
End: 2018-01-10

## 2018-01-10 NOTE — RESEARCH
Met with patient to provide study drug. Coordinator reviewed instructions and pill diary and patient v/u. Patient is aware of FU apt with Dr. Caruso on 02/20/18.

## 2018-01-26 ENCOUNTER — RESEARCH ENCOUNTER (OUTPATIENT)
Dept: ONCOLOGY | Facility: CLINIC | Age: 43
End: 2018-01-26

## 2018-01-26 ENCOUNTER — TELEPHONE (OUTPATIENT)
Dept: ONCOLOGY | Facility: CLINIC | Age: 43
End: 2018-01-26

## 2018-01-26 RX ORDER — AZITHROMYCIN 250 MG/1
TABLET, FILM COATED ORAL
Qty: 6 TABLET | Refills: 0 | Status: SHIPPED | OUTPATIENT
Start: 2018-01-26 | End: 2018-04-10

## 2018-01-26 NOTE — RESEARCH
Spoke to pt 01/26/18 she reports multiple nose bleeds, headache, fever and ear pressure, which started last week. She states that she was tested for the flu on Monday and it was negative.  Message relayed to clinic nurse to see what treatments are needed.

## 2018-01-26 NOTE — TELEPHONE ENCOUNTER
Research spoke with the patient earlier today and she had reported feeling bad for over a week now. They asked I call to check on her. Mary said last week she developed: nose bleeds, sinus pressure, headaches unrelieved by her imitrex, ear pressure, fever, cough, diarrhea, and sore throat. She saw her pcp on Monday. They did swab for flu, which was negative. Per pt they said there was nothing to give her due to the trial she is on. Dr. Caruso is sending in antibiotic. Patient instructed it is ok to take over the counter medications such as tylenol, motrin, mucinex to help with cold symptoms. I asked that she call me back Monday or Tuesday if she does not feel better.

## 2018-02-20 ENCOUNTER — LAB (OUTPATIENT)
Dept: LAB | Facility: HOSPITAL | Age: 43
End: 2018-02-20

## 2018-02-20 ENCOUNTER — OFFICE VISIT (OUTPATIENT)
Dept: ONCOLOGY | Facility: CLINIC | Age: 43
End: 2018-02-20

## 2018-02-20 VITALS
HEART RATE: 79 BPM | DIASTOLIC BLOOD PRESSURE: 65 MMHG | RESPIRATION RATE: 18 BRPM | WEIGHT: 220 LBS | TEMPERATURE: 97.2 F | HEIGHT: 66 IN | BODY MASS INDEX: 35.36 KG/M2 | SYSTOLIC BLOOD PRESSURE: 115 MMHG

## 2018-02-20 DIAGNOSIS — Z17.0 MALIGNANT NEOPLASM OF CENTRAL PORTION OF LEFT BREAST IN FEMALE, ESTROGEN RECEPTOR POSITIVE (HCC): ICD-10-CM

## 2018-02-20 DIAGNOSIS — E78.1 HIGH TRIGLYCERIDES: Primary | ICD-10-CM

## 2018-02-20 DIAGNOSIS — C50.112 MALIGNANT NEOPLASM OF CENTRAL PORTION OF LEFT BREAST IN FEMALE, ESTROGEN RECEPTOR POSITIVE (HCC): ICD-10-CM

## 2018-02-20 DIAGNOSIS — C50.112 MALIGNANT NEOPLASM OF CENTRAL PORTION OF LEFT FEMALE BREAST, UNSPECIFIED ESTROGEN RECEPTOR STATUS (HCC): Primary | ICD-10-CM

## 2018-02-20 LAB
ALBUMIN SERPL-MCNC: 4.2 G/DL (ref 3.2–4.8)
ALBUMIN/GLOB SERPL: 1.6 G/DL (ref 1.5–2.5)
ALP SERPL-CCNC: 88 U/L (ref 25–100)
ALT SERPL W P-5'-P-CCNC: 44 U/L (ref 7–40)
ANION GAP SERPL CALCULATED.3IONS-SCNC: 11 MMOL/L (ref 3–11)
ARTICHOKE IGE QN: 76 MG/DL (ref 0–130)
AST SERPL-CCNC: 36 U/L (ref 0–33)
BILIRUB SERPL-MCNC: 0.4 MG/DL (ref 0.3–1.2)
BUN BLD-MCNC: 11 MG/DL (ref 9–23)
BUN/CREAT SERPL: 13.8 (ref 7–25)
CALCIUM SPEC-SCNC: 8.8 MG/DL (ref 8.7–10.4)
CHLORIDE SERPL-SCNC: 105 MMOL/L (ref 99–109)
CHOLEST SERPL-MCNC: 482 MG/DL (ref 0–200)
CO2 SERPL-SCNC: 20 MMOL/L (ref 20–31)
CREAT BLD-MCNC: 0.8 MG/DL (ref 0.6–1.3)
ERYTHROCYTE [DISTWIDTH] IN BLOOD BY AUTOMATED COUNT: 12.6 % (ref 11.3–14.5)
GFR SERPL CREATININE-BSD FRML MDRD: 79 ML/MIN/1.73
GLOBULIN UR ELPH-MCNC: 2.7 GM/DL
GLUCOSE BLD-MCNC: 98 MG/DL (ref 70–100)
HCT VFR BLD AUTO: 38.6 % (ref 34.5–44)
HDLC SERPL-MCNC: 49 MG/DL (ref 40–60)
HGB BLD-MCNC: 12.4 G/DL (ref 11.5–15.5)
LYMPHOCYTES # BLD AUTO: 2.1 10*3/MM3 (ref 0.6–4.8)
LYMPHOCYTES NFR BLD AUTO: 35.6 % (ref 24–44)
MCH RBC QN AUTO: 25.5 PG (ref 27–31)
MCHC RBC AUTO-ENTMCNC: 32.1 G/DL (ref 32–36)
MCV RBC AUTO: 79.6 FL (ref 80–99)
MONOCYTES # BLD AUTO: 0.4 10*3/MM3 (ref 0–1)
MONOCYTES NFR BLD AUTO: 6.1 % (ref 0–12)
NEUTROPHILS # BLD AUTO: 3.5 10*3/MM3 (ref 1.5–8.3)
NEUTROPHILS NFR BLD AUTO: 58.3 % (ref 41–71)
PLATELET # BLD AUTO: 236 10*3/MM3 (ref 150–450)
PMV BLD AUTO: 8.3 FL (ref 6–12)
POTASSIUM BLD-SCNC: 4 MMOL/L (ref 3.5–5.5)
PROT SERPL-MCNC: 6.9 G/DL (ref 5.7–8.2)
RBC # BLD AUTO: 4.85 10*6/MM3 (ref 3.89–5.14)
SODIUM BLD-SCNC: 136 MMOL/L (ref 132–146)
TRIGL SERPL-MCNC: >1100 MG/DL (ref 0–150)
WBC NRBC COR # BLD: 6 10*3/MM3 (ref 3.5–10.8)

## 2018-02-20 PROCEDURE — 80053 COMPREHEN METABOLIC PANEL: CPT

## 2018-02-20 PROCEDURE — 85025 COMPLETE CBC W/AUTO DIFF WBC: CPT

## 2018-02-20 PROCEDURE — 99214 OFFICE O/P EST MOD 30 MIN: CPT | Performed by: INTERNAL MEDICINE

## 2018-02-20 PROCEDURE — 36415 COLL VENOUS BLD VENIPUNCTURE: CPT

## 2018-02-20 PROCEDURE — 80061 LIPID PANEL: CPT | Performed by: INTERNAL MEDICINE

## 2018-02-20 RX ORDER — CALCIPOTRIENE AND BETAMETHASONE DIPROPIONATE 50; .5 UG/G; MG/G
SUSPENSION TOPICAL
COMMUNITY
Start: 2017-12-20 | End: 2018-04-10

## 2018-02-20 RX ORDER — ATENOLOL 25 MG/1
25 TABLET ORAL DAILY
Qty: 30 TABLET | Refills: 2 | Status: SHIPPED | OUTPATIENT
Start: 2018-02-20 | End: 2018-08-14

## 2018-02-20 NOTE — PROGRESS NOTES
Called patient re: lab results.   TG are > 1100, likely 2/2 everolimus.  This is a grade 4 toxicity and she will need to stop treatment.   I told her not to take another dose.  Alexa from research will contact her re: returning her medication supply.  I will plan to repeat her lipid panel in the future.    Since she is stopping the study drug, I recommended that she hold off on starting atenolol, as it is possible that her headaches may decrease in frequency.    F/u as previously scheduled.  
Color consistent with ethnicity/race, warm, dry intact, resilient.

## 2018-02-20 NOTE — PROGRESS NOTES
PROBLEM LIST:  1. [id number] invasive ductal carcinoma of the left breast, ER positive [place] positive HER-2 negative  A) the patient presented with skin dimpling under the left breast. A needle localized biopsy was done on 2/8/2017. Pathology showed a 2.2 cm invasive ductal carcinoma. There were focally positive margins.   Bilateral mastectomy done on 4/12/17.  Pathology showed residual in situ DICIS.  0/1 SLN involved.  [id number] (Stage IIA).  Oncotype score 34 in the high risk group.  Adjuvant chemotherapy with taxotere and cyclophophamide started 5/9/17.  Long delay between cycle 1 and cycle 2 due to mastectomy wound infection.  Severe allergic reaction after cycle 3 with hives, tightness in throat, and swelling of face that lasted for over 2 weeks despite high dose steroids.  CMF given for cycle #4.  B) tamoxifen started October 2017    Subjective     HISTORY OF PRESENT ILLNESS:   Mary Ambrosio returns for follow-up.  She has been taking the study drug for about 6 weeks.  Since taking as she has noticed more frequent nosebleeds as well as a sense of fullness in her years.  She has not had any mouth sores or nausea.  She also is having more frequent headaches.  They do feel like her typical migraine but she has not had much benefit from Imitrex.  There happening every 3-4 days.  She did noticed increased migraines with the tamoxifen but they are more frequent now since starting the study drug.    Past Medical History, Past Surgical History, Social History, Family History have been reviewed and are without significant changes except as mentioned.    Review of Systems   A comprehensive 14 point review of systems was performed and was negative except as mentioned.    Medications:  The current medication list was reviewed in the EMR    ALLERGIES:    Allergies   Allergen Reactions   • Taxotere [Docetaxel] Hives and Shortness Of Breath   • Tegaderm Ag Mesh [Silver] Rash       Objective      /65  Pulse 79   "Temp 97.2 °F (36.2 °C)  Resp 18  Ht 167.6 cm (66\")  Wt 99.8 kg (220 lb)  BMI 35.51 kg/m2     Performance Status: 0    General: well appearing female in no acute distress  Neuro: alert and oriented  HEENT: sclera anicteric, oropharynx clear  Lymphatics: no cervical, supraclavicular, or axillary adenopathy.  The the area in the right neck she is noticing there is subtle fullness, no palpable mass.  Cardiovascular: regular rate and rhythm, no murmurs  Lungs: clear to auscultation bilaterally  Abdomen: soft, nontender, nondistended.  No palpable organomegaly  Extremeties: no lower extremity edema  Skin: No current rashes or urticaria.  Psych: Mood and affect appropriate        Assessment/Plan   Mary Ambrosio is a 42 y.o. year old female with stage II a ER positive DC positive HER-2 negative invasive ductal carcinoma of the left breast with a high risk oncotype.     Breast cancer: continue tamoxifen.   She is on  and receiving study drug.  She is having some grade 1 nosebleeds but otherwise seems to be tolerating it fairly well.  It is possible that increased frequency of headaches is related to treatment as well.    Hot flashes:  Continue Effexor    Neuropathy: Stable    Right neck fullness: This has not resolved since her last visit.  We will do a CT of the neck to rule out a mass.    Migraines: She is having migraines up to twice weekly.  We will try putting her on a beta blocker to see if this decreases the frequency.  This could cause a lowering of blood pressure, and we may need to stop this medicine if this is a problem for her.    F/u 5-6 weeks per protocol.  I will call her with the results of her scan.              Visit time was 25 minutes, greater than 50% spent in counseling      Nancy Caruso MD  [facility] Hematology and Oncology    2/20/2018          CC:          "

## 2018-02-21 ENCOUNTER — TELEPHONE (OUTPATIENT)
Dept: ONCOLOGY | Facility: CLINIC | Age: 43
End: 2018-02-21

## 2018-02-21 NOTE — TELEPHONE ENCOUNTER
Called and spoke with patient. Requested that she come in fasting for labs at her apt in March. Apt is at 11:00. I offered to move it up earlier in the morning, she declined.

## 2018-02-23 ENCOUNTER — HOSPITAL ENCOUNTER (OUTPATIENT)
Dept: CT IMAGING | Facility: HOSPITAL | Age: 43
Discharge: HOME | End: 2018-02-23
Attending: INTERNAL MEDICINE | Admitting: INTERNAL MEDICINE

## 2018-02-23 DIAGNOSIS — C50.112 MALIGNANT NEOPLASM OF CENTRAL PORTION OF LEFT FEMALE BREAST, UNSPECIFIED ESTROGEN RECEPTOR STATUS (HCC): ICD-10-CM

## 2018-02-23 PROCEDURE — 0 IOPAMIDOL PER 1 ML: Performed by: INTERNAL MEDICINE

## 2018-02-23 PROCEDURE — 70491 CT SOFT TISSUE NECK W/DYE: CPT

## 2018-02-23 RX ADMIN — IOPAMIDOL 48 ML: 755 INJECTION, SOLUTION INTRAVENOUS at 14:45

## 2018-02-28 ENCOUNTER — TELEPHONE (OUTPATIENT)
Dept: ONCOLOGY | Facility: CLINIC | Age: 43
End: 2018-02-28

## 2018-03-26 ENCOUNTER — TELEPHONE (OUTPATIENT)
Dept: ONCOLOGY | Facility: CLINIC | Age: 43
End: 2018-03-26

## 2018-03-26 NOTE — TELEPHONE ENCOUNTER
Returned call to , Dean. Unique has been admitted to [facility]. They are waiting to hear if her gallbladder will be removed today or not. They just got to a room. Her pain is in control, and she is resting.     Asked Dean to tell patient to call us once she is discharged and home/ settled to let us know how she is and also schedule her follow up apt. She was due to come back and see Dr. Caruso the 1st week of March.

## 2018-03-26 NOTE — TELEPHONE ENCOUNTER
----- Message from Alice Cano sent at 3/26/2018  8:53 AM EDT -----  Regarding: [facility] - GALLBLADDER ATTACK / FYI  Contact: [phone]  BOBBY , PATIENT SPOUSE CALLED AND SAID THEY ARE CURRENTLY AT [facility] AND SHE IS HAVING A GALLBLADDER ATTACK.

## 2018-04-09 NOTE — PROGRESS NOTES
PROBLEM LIST:  1. [id number] invasive ductal carcinoma of the left breast, ER positive DE positive HER-2 negative  A) the patient presented with skin dimpling under the left breast. A needle localized biopsy was done on 2/8/2017. Pathology showed a 2.2 cm invasive ductal carcinoma. There were focally positive margins.   Bilateral mastectomy done on 4/12/17.  Pathology showed residual in situ DICIS.  0/1 SLN involved.  [id number] (Stage IIA).  Oncotype score 34 in the high risk group.  Adjuvant chemotherapy with taxotere and cyclophophamide started 5/9/17.  Long delay between cycle 1 and cycle 2 due to mastectomy wound infection.  Severe allergic reaction after cycle 3 with hives, tightness in throat, and swelling of face that lasted for over 2 weeks despite high dose steroids.  CMF given for cycle #4.  B) tamoxifen started October 2017. Enrolled in  of everolimus versus placebo.  Treatment stopped due to grade 4 hypertriglyceridemia.    Subjective     HISTORY OF PRESENT ILLNESS:   Mary Ambrosio returns for follow-up.  She had a gallbladder attack last month and was admitted to the hospital.   Her gallbladder was removed and she had her surgery follow-up recently.  She is recovering from this.  She reports feeling tired a lot of the time and has started going to the gym to try to exercise.  She is planning on returning to work 2 days a week in the near future.  Neuropathy symptoms are stable.  Her migraines have improved significantly since she stopped taking the study drug.    Past Medical History, Past Surgical History, Social History, Family History have been reviewed and are without significant changes except as mentioned.    Review of Systems   A comprehensive 14 point review of systems was performed and was negative except as mentioned.    Medications:  The current medication list was reviewed in the EMR    ALLERGIES:    Allergies   Allergen Reactions   • Taxotere [Docetaxel] Hives and Shortness Of  Breath   • Tegaderm Ag Mesh [Silver] Rash       Objective      /73   Pulse 65   Temp 97.5 °F (36.4 °C) (Temporal Artery )   Resp 12   Wt 98.9 kg (218 lb)   SpO2 98%   BMI 35.19 kg/m²      Performance Status: 0    General: well appearing female in no acute distress  Neuro: alert and oriented  HEENT: sclera anicteric, oropharynx clear  Lymphatics: no cervical, supraclavicular, or axillary adenopathy.  Cardiovascular: regular rate and rhythm, no murmurs  Lungs: clear to auscultation bilaterally  Abdomen: soft, nontender, nondistended.  No palpable organomegaly  Extremeties: no lower extremity edema  Skin: No current rashes or urticaria.  Psych: Mood and affect appropriate        Assessment/Plan   Mary Ambrosio is a 42 y.o. year old female with stage II a ER positive SC positive HER-2 negative invasive ductal carcinoma of the left breast with a high risk oncotype.     Breast cancer: continue tamoxifen.  She will continue to be followed on S 1207 and that she had to stop the drug due to a grade 4 reaction.  She will follow-up in 3 months.    Hypertriglyceridemia: Recheck a fasting lipids was drawn today.  I anticipate her triglycerides will return to baseline.    Hot flashes:  Continue Effexor    Neuropathy: Stable    Migraines: Improved since stopping the study drug.    F/u 3 months.            Visit time was 25 minutes, greater than 50% spent in counseling      Nancy Caruso MD  [facility] Hematology and Oncology    4/10/2018          CC:

## 2018-04-10 ENCOUNTER — LAB (OUTPATIENT)
Dept: LAB | Facility: HOSPITAL | Age: 43
End: 2018-04-10

## 2018-04-10 ENCOUNTER — OFFICE VISIT (OUTPATIENT)
Dept: ONCOLOGY | Facility: CLINIC | Age: 43
End: 2018-04-10

## 2018-04-10 VITALS
HEART RATE: 65 BPM | DIASTOLIC BLOOD PRESSURE: 73 MMHG | BODY MASS INDEX: 35.19 KG/M2 | SYSTOLIC BLOOD PRESSURE: 115 MMHG | OXYGEN SATURATION: 98 % | TEMPERATURE: 97.5 F | RESPIRATION RATE: 12 BRPM | WEIGHT: 218 LBS

## 2018-04-10 DIAGNOSIS — C50.112 MALIGNANT NEOPLASM OF CENTRAL PORTION OF LEFT FEMALE BREAST, UNSPECIFIED ESTROGEN RECEPTOR STATUS (HCC): Primary | ICD-10-CM

## 2018-04-10 DIAGNOSIS — E78.1 HIGH TRIGLYCERIDES: ICD-10-CM

## 2018-04-10 LAB
ARTICHOKE IGE QN: 124 MG/DL (ref 0–130)
CHOLEST SERPL-MCNC: 250 MG/DL (ref 0–200)
HDLC SERPL-MCNC: 50 MG/DL (ref 40–60)
TRIGL SERPL-MCNC: 398 MG/DL (ref 0–150)

## 2018-04-10 PROCEDURE — 80061 LIPID PANEL: CPT

## 2018-04-10 PROCEDURE — 36415 COLL VENOUS BLD VENIPUNCTURE: CPT

## 2018-04-10 PROCEDURE — 99214 OFFICE O/P EST MOD 30 MIN: CPT | Performed by: INTERNAL MEDICINE

## 2018-05-04 ENCOUNTER — TELEPHONE (OUTPATIENT)
Dept: ONCOLOGY | Facility: CLINIC | Age: 43
End: 2018-05-04

## 2018-05-04 NOTE — TELEPHONE ENCOUNTER
----- Message from Keyla Lund sent at 5/4/2018  8:54 AM EDT -----  Regarding: [facility]-RETURN TO WORK NOTE UPDATED  Contact: [phone]  Patient needs an updated return to work note it needs to say what the last one said but in addition that these restrictions are indefinite. Fax this to [phone] attention Adelina Bolton.

## 2018-05-04 NOTE — TELEPHONE ENCOUNTER
Returned call to patient and left message on VM that had faxed letter over to Ms. Bolton's office.

## 2018-06-01 ENCOUNTER — TELEPHONE (OUTPATIENT)
Dept: ONCOLOGY | Facility: CLINIC | Age: 43
End: 2018-06-01

## 2018-06-01 NOTE — TELEPHONE ENCOUNTER
----- Message from Alice Cano sent at 6/1/2018  8:54 AM EDT -----  Regarding: [facility] - QUESTION ABOUT CHEMO REGIMEN  Contact: [phone]  LORRAINE WITH DR MERE JARA'S OFFICE CALLED AND WANTS TO KNOW IF THE PATIENTS CHEMO REGIMEN WILL AFFECT REVISING HER BREAST?     EXTENSION: 143

## 2018-06-01 NOTE — TELEPHONE ENCOUNTER
Left VM on Sadi's phone line: She should stop tamoxifen 1 week before surgery and restart it 1 week after due to potential for increased risk of blood clots.

## 2018-07-02 NOTE — PROGRESS NOTES
PROBLEM LIST:  1. [id number] invasive ductal carcinoma of the left breast, ER positive [place] positive HER-2 negative  A) the patient presented with skin dimpling under the left breast. A needle localized biopsy was done on 2/8/2017. Pathology showed a 2.2 cm invasive ductal carcinoma. There were focally positive margins.   Bilateral mastectomy done on 4/12/17.  Pathology showed residual in situ DICIS.  0/1 SLN involved.  [id number] (Stage IIA).  Oncotype score 34 in the high risk group.  Adjuvant chemotherapy with taxotere and cyclophophamide started 5/9/17.  Long delay between cycle 1 and cycle 2 due to mastectomy wound infection.  Severe allergic reaction after cycle 3 with hives, tightness in throat, and swelling of face that lasted for over 2 weeks despite high dose steroids.  CMF given for cycle #4.  B) tamoxifen started October 2017. Enrolled in  of everolimus versus placebo.  Treatment stopped due to grade 4 hypertriglyceridemia.    Subjective     HISTORY OF PRESENT ILLNESS:   Mary Ambrosio returns for follow-up.  She has a few concerns today.  She has noticed a lump in her left upper neck.  She also has been having some right thigh pain.  It has gotten worse and more frequent over the past few weeks.  She notices a both with standing as well as when she is sitting down.  It is located in the mid thigh.  She continues to take tamoxifen without much difficulty.    Past Medical History, Past Surgical History, Social History, Family History have been reviewed and are without significant changes except as mentioned.    Review of Systems   A comprehensive 14 point review of systems was performed and was negative except as mentioned.    Medications:  The current medication list was reviewed in the EMR    ALLERGIES:    Allergies   Allergen Reactions   • Taxotere [Docetaxel] Hives and Shortness Of Breath   • Tegaderm Ag Mesh [Silver] Rash       Objective      /65 Comment: RUE  Pulse 83   Temp 98.6 °F  "(37 °C) (Temporal Artery )   Resp 18   Ht 167.6 cm (66\")   Wt 99.8 kg (220 lb)   BMI 35.51 kg/m²      Performance Status: 0    General: well appearing female in no acute distress  Neuro: alert and oriented  HEENT: sclera anicteric, oropharynx clear  Lymphatics: no supraclavicular, or axillary adenopathy.  There is a single 1 cm left upper cervical lymph node which is tender and movable  Cardiovascular: regular rate and rhythm, no murmurs  Lungs: clear to auscultation bilaterally  Abdomen: soft, nontender, nondistended.  No palpable organomegaly  Extremeties: no lower extremity edema.  No tenderness with palpation of right thigh or hip  Skin: No current rashes or urticaria.  Psych: Mood and affect appropriate        Assessment/Plan   Mary Ambrosio is a 42 y.o. year old female with stage II a ER positive CT positive HER-2 negative invasive ductal carcinoma of the left breast with a high risk oncotype.     Breast cancer: continue tamoxifen.  She is tolerating this reasonably well.  She is having a new right leg pain that has been worsening over the past several weeks.  We will do a bone scan to evaluate.  She also has a lymph node in the left neck that is about 1 cm in size.  I will see her back in a month to reexamine this and consider further evaluation if it is persistent or enlarging.    Hypertriglyceridemia: Return to baseline after stopping everolimus    Hot flashes:  Continue Effexor        F/u 1 months.            Visit time was 25 minutes, greater than 50% spent in counseling      Nancy Caruso MD  [facility] Hematology and Oncology    7/3/2018          CC:          "

## 2018-07-03 ENCOUNTER — RESEARCH ENCOUNTER (OUTPATIENT)
Dept: ONCOLOGY | Facility: HOSPITAL | Age: 43
End: 2018-07-03

## 2018-07-03 ENCOUNTER — LAB (OUTPATIENT)
Dept: LAB | Facility: HOSPITAL | Age: 43
End: 2018-07-03

## 2018-07-03 ENCOUNTER — OFFICE VISIT (OUTPATIENT)
Dept: ONCOLOGY | Facility: CLINIC | Age: 43
End: 2018-07-03

## 2018-07-03 VITALS
SYSTOLIC BLOOD PRESSURE: 119 MMHG | HEART RATE: 83 BPM | HEIGHT: 66 IN | RESPIRATION RATE: 18 BRPM | BODY MASS INDEX: 35.36 KG/M2 | WEIGHT: 220 LBS | DIASTOLIC BLOOD PRESSURE: 65 MMHG | TEMPERATURE: 98.6 F

## 2018-07-03 DIAGNOSIS — C50.112 MALIGNANT NEOPLASM OF CENTRAL PORTION OF LEFT FEMALE BREAST, UNSPECIFIED ESTROGEN RECEPTOR STATUS (HCC): ICD-10-CM

## 2018-07-03 DIAGNOSIS — M79.604 PAIN OF RIGHT LOWER EXTREMITY: ICD-10-CM

## 2018-07-03 DIAGNOSIS — C50.112 MALIGNANT NEOPLASM OF CENTRAL PORTION OF LEFT FEMALE BREAST, UNSPECIFIED ESTROGEN RECEPTOR STATUS (HCC): Primary | ICD-10-CM

## 2018-07-03 LAB
ARTICHOKE IGE QN: 151 MG/DL (ref 0–130)
CHOLEST SERPL-MCNC: 240 MG/DL (ref 0–200)
HDLC SERPL-MCNC: 50 MG/DL (ref 40–60)
TRIGL SERPL-MCNC: 381 MG/DL (ref 0–150)

## 2018-07-03 PROCEDURE — 36415 COLL VENOUS BLD VENIPUNCTURE: CPT

## 2018-07-03 PROCEDURE — 80061 LIPID PANEL: CPT

## 2018-07-03 PROCEDURE — 99214 OFFICE O/P EST MOD 30 MIN: CPT | Performed by: INTERNAL MEDICINE

## 2018-07-03 RX ORDER — LEVOCETIRIZINE DIHYDROCHLORIDE 5 MG/1
5 TABLET, FILM COATED ORAL EVERY EVENING
COMMUNITY
End: 2020-03-12

## 2018-07-03 NOTE — RESEARCH
I saw Ms. Ambrosio today for her 37wk follow up visit for the clinical trial .  I introduced myself and explained that I would be taking over for Alexa Jenkins. I also provided her with my contact information.  She requested information regarding other clinical trials she would be eligible for and we discussed the Aspirin study johanaifly.  We were not sure if she would be eligible and unfortunately, she will not be eligible due to her initial diagnosis day being greater than 1 year out.  I told her I would look into other options and get back to her when she returns to clinic in 1 month.  I do not need to see her for  until September.      Corine Luna   Research RN

## 2018-07-10 ENCOUNTER — HOSPITAL ENCOUNTER (OUTPATIENT)
Dept: NUCLEAR MEDICINE | Facility: HOSPITAL | Age: 43
Discharge: HOME | End: 2018-07-10
Attending: INTERNAL MEDICINE

## 2018-07-10 DIAGNOSIS — C50.112 MALIGNANT NEOPLASM OF CENTRAL PORTION OF LEFT FEMALE BREAST, UNSPECIFIED ESTROGEN RECEPTOR STATUS (HCC): ICD-10-CM

## 2018-07-10 DIAGNOSIS — M79.604 PAIN OF RIGHT LOWER EXTREMITY: ICD-10-CM

## 2018-07-10 PROCEDURE — A9503 TC99M MEDRONATE: HCPCS | Performed by: INTERNAL MEDICINE

## 2018-07-10 PROCEDURE — 78306 BONE IMAGING WHOLE BODY: CPT

## 2018-07-10 PROCEDURE — 0 TECHNETIUM MEDRONATE KIT: Performed by: INTERNAL MEDICINE

## 2018-07-10 RX ORDER — TC 99M MEDRONATE 20 MG/10ML
26.4 INJECTION, POWDER, LYOPHILIZED, FOR SOLUTION INTRAVENOUS
Status: COMPLETED | OUTPATIENT
Start: 2018-07-10 | End: 2018-07-10

## 2018-07-10 RX ADMIN — Medication 26.4 MILLICURIE: at 09:40

## 2018-07-13 ENCOUNTER — RESEARCH ENCOUNTER (OUTPATIENT)
Dept: ONCOLOGY | Facility: HOSPITAL | Age: 43
End: 2018-07-13

## 2018-07-13 NOTE — RESEARCH
I spoke with Ms. Ambrosio today over the phone to follow up with her about potential studies she could be eligible for in the future.  Unfortunately, she will not qualify for Aspirin because she is too far from her initial date of diagnosis.  If the patient progressed in the future, there is a study that [facility] may be opening to the Network [id number] which is for patients with Hormone receptor positive HER2 negative disease who have had an advancement in their cancer.  I also asked the patient, per the  protocol, if she thought she might have missed any Tamoxifen doses during the last RP.  She said she probably missed 4 doses since April.  I asked her if she had any further questions at this time and she said she did not. We will continue to follow her.    Corine Luna   Research RN

## 2018-07-20 DIAGNOSIS — C50.112 MALIGNANT NEOPLASM OF CENTRAL PORTION OF LEFT BREAST IN FEMALE, ESTROGEN RECEPTOR POSITIVE (HCC): ICD-10-CM

## 2018-07-20 DIAGNOSIS — Z17.0 MALIGNANT NEOPLASM OF CENTRAL PORTION OF LEFT BREAST IN FEMALE, ESTROGEN RECEPTOR POSITIVE (HCC): ICD-10-CM

## 2018-07-20 RX ORDER — VENLAFAXINE HYDROCHLORIDE 75 MG/1
CAPSULE, EXTENDED RELEASE ORAL
Qty: 30 CAPSULE | Refills: 5 | Status: SHIPPED | OUTPATIENT
Start: 2018-07-20 | End: 2018-08-07 | Stop reason: SDUPTHER

## 2018-08-05 ENCOUNTER — APPOINTMENT (OUTPATIENT)
Dept: PREADMISSION TESTING | Facility: HOSPITAL | Age: 43
End: 2018-08-05

## 2018-08-05 VITALS — HEIGHT: 66 IN | BODY MASS INDEX: 35.08 KG/M2 | WEIGHT: 218.26 LBS

## 2018-08-05 LAB
ALBUMIN SERPL-MCNC: 4.43 G/DL (ref 3.2–4.8)
ALBUMIN/GLOB SERPL: 1.4 G/DL (ref 1.5–2.5)
ALP SERPL-CCNC: 85 U/L (ref 25–100)
ALT SERPL W P-5'-P-CCNC: 43 U/L (ref 7–40)
ANION GAP SERPL CALCULATED.3IONS-SCNC: 6 MMOL/L (ref 3–11)
AST SERPL-CCNC: 35 U/L (ref 0–33)
BILIRUB SERPL-MCNC: 0.4 MG/DL (ref 0.3–1.2)
BUN BLD-MCNC: 14 MG/DL (ref 9–23)
BUN/CREAT SERPL: 15.7 (ref 7–25)
CALCIUM SPEC-SCNC: 9.2 MG/DL (ref 8.7–10.4)
CHLORIDE SERPL-SCNC: 107 MMOL/L (ref 99–109)
CO2 SERPL-SCNC: 26 MMOL/L (ref 20–31)
CREAT BLD-MCNC: 0.89 MG/DL (ref 0.6–1.3)
DEPRECATED RDW RBC AUTO: 41 FL (ref 37–54)
ERYTHROCYTE [DISTWIDTH] IN BLOOD BY AUTOMATED COUNT: 13.2 % (ref 11.3–14.5)
GFR SERPL CREATININE-BSD FRML MDRD: 70 ML/MIN/1.73
GLOBULIN UR ELPH-MCNC: 3.1 GM/DL
GLUCOSE BLD-MCNC: 91 MG/DL (ref 70–100)
HCT VFR BLD AUTO: 41.8 % (ref 34.5–44)
HGB BLD-MCNC: 13.7 G/DL (ref 11.5–15.5)
MCH RBC QN AUTO: 27.9 PG (ref 27–31)
MCHC RBC AUTO-ENTMCNC: 32.8 G/DL (ref 32–36)
MCV RBC AUTO: 85.1 FL (ref 80–99)
PLATELET # BLD AUTO: 307 10*3/MM3 (ref 150–450)
PMV BLD AUTO: 9.8 FL (ref 6–12)
POTASSIUM BLD-SCNC: 4.3 MMOL/L (ref 3.5–5.5)
PROT SERPL-MCNC: 7.5 G/DL (ref 5.7–8.2)
RBC # BLD AUTO: 4.91 10*6/MM3 (ref 3.89–5.14)
SODIUM BLD-SCNC: 139 MMOL/L (ref 132–146)
WBC NRBC COR # BLD: 7.46 10*3/MM3 (ref 3.5–10.8)

## 2018-08-05 PROCEDURE — 85027 COMPLETE CBC AUTOMATED: CPT | Performed by: FAMILY MEDICINE

## 2018-08-05 PROCEDURE — 80053 COMPREHEN METABOLIC PANEL: CPT | Performed by: FAMILY MEDICINE

## 2018-08-05 PROCEDURE — 36415 COLL VENOUS BLD VENIPUNCTURE: CPT

## 2018-08-05 NOTE — DISCHARGE INSTRUCTIONS
The following information and instructions were given:    NPO after MN except sips of water with routine prescribed medication (except blood thinner, diabetes, or weight reducing medication) unless otherwise instructed by your physician.  Do not eat, drink, smoke or chew gum after midnight the night before surgery. This also includes no mints.    DO NOT shave for two days before your procedure.  Do not wear makeup.      DO NOT wear fingernail polish (gel/regular) and/or acrylic/artificial nails on the day of surgery.   If a patient had recent manicure and would rather not remove polish or artificial nails, then the minimum requirement is that the polish/artificial nails must be removed from the middle finger on each hand.      If patient is having surgery/procedure on an upper extremity, then the patient was instructed that fingernail polish/artificial fingernails must be removed for surgery.  NO EXCEPTIONS.      If patient is having surgery/procedure on a lower extremity, then the patient was instructed that toenail polish on both extremities must be removed for surgery.  NO EXCEPTIONS.    Remove all jewelry (advised to go to jeweler if unable to remove).  Jewelry, especially rings, can no longer be taped for surgery.    Leave anything you consider valuable at home.    Leave your suitcase in the car until after your surgery.    Bring the following with you (if applicable)       -picture ID and insurance cards       -Co-pay/deductible required by insurance       -Medications in the original bottles (not a list) including all over-the-counter  medications if not brought to [facility]       -Copy of advance directive, living will or power of  documents if not  brought to [facility]       -CPAP or BIPAP mask and tubing (do not bring machine)       -Skin prep instructions sheet       -PAT Pass    Education booklet, brochure, handout or binder given to patient.    Pain Control After Surgery handout given to  patient.    Respirex use (handout given to patient) and pneumonia prevention.    Signs and Symptoms of infection discussed.    DVT Prevention education given.  Stressing the importance of ambulation.    Patient to apply Chlorhexadine wipes to surgical area (as instructed) the night before procedure and the AM of procedure. WIPES GIVEN.      BREAST ERAS DISCUSSED WITH PT.

## 2018-08-07 ENCOUNTER — ANESTHESIA EVENT (OUTPATIENT)
Dept: PERIOP | Facility: HOSPITAL | Age: 43
End: 2018-08-07

## 2018-08-07 DIAGNOSIS — Z17.0 MALIGNANT NEOPLASM OF CENTRAL PORTION OF LEFT BREAST IN FEMALE, ESTROGEN RECEPTOR POSITIVE (HCC): ICD-10-CM

## 2018-08-07 DIAGNOSIS — C50.112 MALIGNANT NEOPLASM OF CENTRAL PORTION OF LEFT BREAST IN FEMALE, ESTROGEN RECEPTOR POSITIVE (HCC): ICD-10-CM

## 2018-08-07 RX ORDER — VENLAFAXINE HYDROCHLORIDE 75 MG/1
75 CAPSULE, EXTENDED RELEASE ORAL DAILY
Qty: 90 CAPSULE | Refills: 3 | Status: SHIPPED | OUTPATIENT
Start: 2018-08-07 | End: 2019-04-22 | Stop reason: SDUPTHER

## 2018-08-07 RX ORDER — FAMOTIDINE 10 MG/ML
20 INJECTION, SOLUTION INTRAVENOUS ONCE
Status: CANCELLED | OUTPATIENT
Start: 2018-08-07 | End: 2018-08-07

## 2018-08-07 RX ORDER — TAMOXIFEN CITRATE 20 MG/1
20 TABLET ORAL DAILY
Qty: 90 TABLET | Refills: 3 | Status: SHIPPED | OUTPATIENT
Start: 2018-08-07 | End: 2019-07-05

## 2018-08-08 ENCOUNTER — HOSPITAL ENCOUNTER (OUTPATIENT)
Facility: HOSPITAL | Age: 43
Setting detail: HOSPITAL OUTPATIENT SURGERY
Discharge: HOME | End: 2018-08-08
Attending: FAMILY MEDICINE | Admitting: ANESTHESIOLOGY

## 2018-08-08 ENCOUNTER — ANESTHESIA (OUTPATIENT)
Dept: PERIOP | Facility: HOSPITAL | Age: 43
End: 2018-08-08

## 2018-08-08 VITALS
BODY MASS INDEX: 35.08 KG/M2 | SYSTOLIC BLOOD PRESSURE: 128 MMHG | RESPIRATION RATE: 16 BRPM | HEART RATE: 66 BPM | OXYGEN SATURATION: 92 % | WEIGHT: 218.26 LBS | DIASTOLIC BLOOD PRESSURE: 71 MMHG | TEMPERATURE: 97.2 F | HEIGHT: 66 IN

## 2018-08-08 DIAGNOSIS — Z85.3 HX OF BREAST CANCER: ICD-10-CM

## 2018-08-08 PROCEDURE — 25010000002 ONDANSETRON PER 1 MG: Performed by: NURSE ANESTHETIST, CERTIFIED REGISTERED

## 2018-08-08 PROCEDURE — 25010000002 DEXAMETHASONE PER 1 MG: Performed by: NURSE ANESTHETIST, CERTIFIED REGISTERED

## 2018-08-08 PROCEDURE — 25010000002 NEOSTIGMINE PER 0.5 MG: Performed by: NURSE ANESTHETIST, CERTIFIED REGISTERED

## 2018-08-08 PROCEDURE — 93005 ELECTROCARDIOGRAM TRACING: CPT | Performed by: ANESTHESIOLOGY

## 2018-08-08 PROCEDURE — 93010 ELECTROCARDIOGRAM REPORT: CPT | Performed by: INTERNAL MEDICINE

## 2018-08-08 PROCEDURE — 25010000002 FENTANYL CITRATE (PF) 100 MCG/2ML SOLUTION: Performed by: NURSE ANESTHETIST, CERTIFIED REGISTERED

## 2018-08-08 PROCEDURE — 88302 TISSUE EXAM BY PATHOLOGIST: CPT | Performed by: FAMILY MEDICINE

## 2018-08-08 PROCEDURE — 25010000003 CEFAZOLIN IN DEXTROSE 2-4 GM/100ML-% SOLUTION: Performed by: FAMILY MEDICINE

## 2018-08-08 PROCEDURE — 25010000002 MIDAZOLAM PER 1 MG: Performed by: NURSE ANESTHETIST, CERTIFIED REGISTERED

## 2018-08-08 PROCEDURE — 25010000002 PROPOFOL 10 MG/ML EMULSION: Performed by: NURSE ANESTHETIST, CERTIFIED REGISTERED

## 2018-08-08 RX ORDER — FENTANYL CITRATE 50 UG/ML
50 INJECTION, SOLUTION INTRAMUSCULAR; INTRAVENOUS
Status: DISCONTINUED | OUTPATIENT
Start: 2018-08-08 | End: 2018-08-08 | Stop reason: HOSPADM

## 2018-08-08 RX ORDER — SODIUM CHLORIDE, SODIUM LACTATE, POTASSIUM CHLORIDE, CALCIUM CHLORIDE 600; 310; 30; 20 MG/100ML; MG/100ML; MG/100ML; MG/100ML
9 INJECTION, SOLUTION INTRAVENOUS CONTINUOUS
Status: DISCONTINUED | OUTPATIENT
Start: 2018-08-08 | End: 2018-08-08 | Stop reason: HOSPADM

## 2018-08-08 RX ORDER — PROPOFOL 10 MG/ML
VIAL (ML) INTRAVENOUS AS NEEDED
Status: DISCONTINUED | OUTPATIENT
Start: 2018-08-08 | End: 2018-08-08 | Stop reason: SURG

## 2018-08-08 RX ORDER — SODIUM CHLORIDE 0.9 % (FLUSH) 0.9 %
1-10 SYRINGE (ML) INJECTION AS NEEDED
Status: DISCONTINUED | OUTPATIENT
Start: 2018-08-08 | End: 2018-08-08 | Stop reason: HOSPADM

## 2018-08-08 RX ORDER — OXYCODONE HYDROCHLORIDE 5 MG/1
5-10 TABLET ORAL EVERY 4 HOURS PRN
Qty: 25 TABLET | Refills: 0 | Status: SHIPPED | OUTPATIENT
Start: 2018-08-08 | End: 2018-08-14

## 2018-08-08 RX ORDER — GLYCOPYRROLATE 0.2 MG/ML
INJECTION INTRAMUSCULAR; INTRAVENOUS AS NEEDED
Status: DISCONTINUED | OUTPATIENT
Start: 2018-08-08 | End: 2018-08-08 | Stop reason: SURG

## 2018-08-08 RX ORDER — FENTANYL CITRATE 50 UG/ML
INJECTION, SOLUTION INTRAMUSCULAR; INTRAVENOUS AS NEEDED
Status: DISCONTINUED | OUTPATIENT
Start: 2018-08-08 | End: 2018-08-08 | Stop reason: SURG

## 2018-08-08 RX ORDER — CEPHALEXIN 500 MG/1
500 CAPSULE ORAL 4 TIMES DAILY
Qty: 40 CAPSULE | Refills: 0 | Status: SHIPPED | OUTPATIENT
Start: 2018-08-08 | End: 2018-08-14

## 2018-08-08 RX ORDER — ONDANSETRON 2 MG/ML
INJECTION INTRAMUSCULAR; INTRAVENOUS AS NEEDED
Status: DISCONTINUED | OUTPATIENT
Start: 2018-08-08 | End: 2018-08-08 | Stop reason: SURG

## 2018-08-08 RX ORDER — ONDANSETRON 4 MG/1
4 TABLET, FILM COATED ORAL EVERY 8 HOURS PRN
Qty: 10 TABLET | Refills: 0 | Status: SHIPPED | OUTPATIENT
Start: 2018-08-08 | End: 2018-08-14

## 2018-08-08 RX ORDER — FAMOTIDINE 20 MG/1
20 TABLET, FILM COATED ORAL ONCE
Status: COMPLETED | OUTPATIENT
Start: 2018-08-08 | End: 2018-08-08

## 2018-08-08 RX ORDER — MIDAZOLAM HYDROCHLORIDE 1 MG/ML
INJECTION INTRAMUSCULAR; INTRAVENOUS AS NEEDED
Status: DISCONTINUED | OUTPATIENT
Start: 2018-08-08 | End: 2018-08-08 | Stop reason: SURG

## 2018-08-08 RX ORDER — DEXAMETHASONE SODIUM PHOSPHATE 4 MG/ML
INJECTION, SOLUTION INTRA-ARTICULAR; INTRALESIONAL; INTRAMUSCULAR; INTRAVENOUS; SOFT TISSUE AS NEEDED
Status: DISCONTINUED | OUTPATIENT
Start: 2018-08-08 | End: 2018-08-08 | Stop reason: SURG

## 2018-08-08 RX ORDER — MAGNESIUM HYDROXIDE 1200 MG/15ML
LIQUID ORAL AS NEEDED
Status: DISCONTINUED | OUTPATIENT
Start: 2018-08-08 | End: 2018-08-08 | Stop reason: HOSPADM

## 2018-08-08 RX ORDER — PROPOFOL 10 MG/ML
VIAL (ML) INTRAVENOUS CONTINUOUS PRN
Status: DISCONTINUED | OUTPATIENT
Start: 2018-08-08 | End: 2018-08-08 | Stop reason: SURG

## 2018-08-08 RX ORDER — LIDOCAINE HYDROCHLORIDE 10 MG/ML
0.5 INJECTION, SOLUTION EPIDURAL; INFILTRATION; INTRACAUDAL; PERINEURAL ONCE AS NEEDED
Status: COMPLETED | OUTPATIENT
Start: 2018-08-08 | End: 2018-08-08

## 2018-08-08 RX ORDER — HYDROMORPHONE HYDROCHLORIDE 1 MG/ML
0.5 INJECTION, SOLUTION INTRAMUSCULAR; INTRAVENOUS; SUBCUTANEOUS
Status: DISCONTINUED | OUTPATIENT
Start: 2018-08-08 | End: 2018-08-08 | Stop reason: HOSPADM

## 2018-08-08 RX ORDER — CEFAZOLIN SODIUM 2 G/100ML
2 INJECTION, SOLUTION INTRAVENOUS ONCE
Status: COMPLETED | OUTPATIENT
Start: 2018-08-08 | End: 2018-08-08

## 2018-08-08 RX ORDER — ATRACURIUM BESYLATE 10 MG/ML
INJECTION, SOLUTION INTRAVENOUS AS NEEDED
Status: DISCONTINUED | OUTPATIENT
Start: 2018-08-08 | End: 2018-08-08 | Stop reason: SURG

## 2018-08-08 RX ORDER — LIDOCAINE HYDROCHLORIDE 10 MG/ML
INJECTION, SOLUTION INFILTRATION; PERINEURAL AS NEEDED
Status: DISCONTINUED | OUTPATIENT
Start: 2018-08-08 | End: 2018-08-08 | Stop reason: SURG

## 2018-08-08 RX ORDER — SODIUM CHLORIDE, SODIUM LACTATE, POTASSIUM CHLORIDE, CALCIUM CHLORIDE 600; 310; 30; 20 MG/100ML; MG/100ML; MG/100ML; MG/100ML
INJECTION, SOLUTION INTRAVENOUS CONTINUOUS PRN
Status: DISCONTINUED | OUTPATIENT
Start: 2018-08-08 | End: 2018-08-08 | Stop reason: SURG

## 2018-08-08 RX ADMIN — SODIUM CHLORIDE, POTASSIUM CHLORIDE, SODIUM LACTATE AND CALCIUM CHLORIDE: 600; 310; 30; 20 INJECTION, SOLUTION INTRAVENOUS at 10:40

## 2018-08-08 RX ADMIN — ATRACURIUM BESYLATE 40 MG: 10 INJECTION, SOLUTION INTRAVENOUS at 10:50

## 2018-08-08 RX ADMIN — FENTANYL CITRATE 25 MCG: 50 INJECTION, SOLUTION INTRAMUSCULAR; INTRAVENOUS at 11:56

## 2018-08-08 RX ADMIN — DEXAMETHASONE SODIUM PHOSPHATE 8 MG: 4 INJECTION, SOLUTION INTRAMUSCULAR; INTRAVENOUS at 10:58

## 2018-08-08 RX ADMIN — LIDOCAINE HYDROCHLORIDE 50 MG: 10 INJECTION, SOLUTION INFILTRATION; PERINEURAL at 10:50

## 2018-08-08 RX ADMIN — ONDANSETRON 4 MG: 2 INJECTION INTRAMUSCULAR; INTRAVENOUS at 11:56

## 2018-08-08 RX ADMIN — MIDAZOLAM HYDROCHLORIDE 2 MG: 1 INJECTION, SOLUTION INTRAMUSCULAR; INTRAVENOUS at 10:40

## 2018-08-08 RX ADMIN — LIDOCAINE HYDROCHLORIDE 0.5 ML: 10 INJECTION, SOLUTION EPIDURAL; INFILTRATION; INTRACAUDAL; PERINEURAL at 09:55

## 2018-08-08 RX ADMIN — PROPOFOL 40 MCG/KG/MIN: 10 INJECTION, EMULSION INTRAVENOUS at 10:50

## 2018-08-08 RX ADMIN — FENTANYL CITRATE 50 MCG: 50 INJECTION INTRAMUSCULAR; INTRAVENOUS at 12:35

## 2018-08-08 RX ADMIN — SODIUM CHLORIDE, POTASSIUM CHLORIDE, SODIUM LACTATE AND CALCIUM CHLORIDE 9 ML/HR: 600; 310; 30; 20 INJECTION, SOLUTION INTRAVENOUS at 09:55

## 2018-08-08 RX ADMIN — FENTANYL CITRATE 75 MCG: 50 INJECTION, SOLUTION INTRAMUSCULAR; INTRAVENOUS at 10:47

## 2018-08-08 RX ADMIN — PROPOFOL 180 MG: 10 INJECTION, EMULSION INTRAVENOUS at 10:50

## 2018-08-08 RX ADMIN — Medication 3 MG: at 11:56

## 2018-08-08 RX ADMIN — FAMOTIDINE 20 MG: 20 TABLET ORAL at 09:55

## 2018-08-08 RX ADMIN — GLYCOPYRROLATE 0.4 MG: 0.2 INJECTION, SOLUTION INTRAMUSCULAR; INTRAVENOUS at 11:56

## 2018-08-08 RX ADMIN — CEFAZOLIN SODIUM 2 MG: 2 INJECTION, SOLUTION INTRAVENOUS at 10:42

## 2018-08-08 RX ADMIN — CEFAZOLIN SODIUM 2 MG: 2 INJECTION, SOLUTION INTRAVENOUS at 10:58

## 2018-08-08 NOTE — OP NOTE
Plastic Surgery Operative Note    Mary Ambrosio  [id number]  1975    Date of Surgery:  8/8/2018 12:14 PM    Pre-op Diagnosis: deformity of the reconstructed breast with personal history of breast cancer.  acquired absence of bilateral breasts    Post-op Diagnosis: same    Procedure: Revision of the bilateral reconstructed breasts with excision of a 16 x 5cm area of redundant skin of the right lateral breast, 2cm x 1cm redundant skin of the medial breast, excision of a 16 x 4cm area of redundant skin of the left lateral breast, 2cm x 1cm area of redundant skin of the medial left breast.    Procedure(s):  BREAST IMPLANT REVISION BILATERAL - EXCISION OF REDUNTANT SKIN    Surgeon(s):  Kym Burnett MD    Anesthesia: General    Surgeon: Kym Burnett MD    Estimated Blood Loss: less than 10ml      Specimens:               ID Type Source Tests Collected by Time   A : right medial redundant skin Tissue Breast, Right TISSUE PATHOLOGY EXAM Kym Burnett MD 8/8/2018 1117   B : right lateral redundant skin Tissue Breast, Right TISSUE PATHOLOGY EXAM Kym Burnett MD 8/8/2018 1124   C : left medial redundant skin Tissue Breast, Left TISSUE PATHOLOGY EXAM Kym Burnett MD 8/8/2018 1130   D : left lateral redundant skin Tissue Breast, Left TISSUE PATHOLOGY EXAM Kym Burnett MD 8/8/2018 1141         Drains: none    Complications: none    History: The patient is a 43-year-old with a history of breast cancer who underwent bilateral mastectomies and implant-based breast reconstruction she presents with late deformity with redundant skin of the bilateral lateral medial breast for excision and other indicated procedures the risks benefits and alternatives and limitations of the procedure were discussed with patient also understanding and wishes to proceed prescription to proceed.     Description of the Procedure: The patient was brought to the operating room placed supine on the operating  room table.  After general endotracheal anesthesia was established she was prepped and draped sterilely.  1% lidocaine with epinephrine was administered and the planned areas excision as well as 0.25% percent Marcaine plain.  Starting on the right side planned areas of excision underwent incision and dissection from the underlying soft tissue.  This measured 16 x 5 cm on the right lateral breast and 2 cm x 1 cm on the right medial breast and 16 x 4 cm on the left lateral breast and 2 cm breast 1 cm on the left medial breast.  All incisions were closed with soft tissue Vicryl deep dermal Monocryl and a running subcuticular Monocryl.  The areas were clean dried and dressed with skin affix and Steri-Strips.  She was placed in a postsurgical bra awakened in the operating room and transferred recovery room in good condition.  Locations.  All needle sponge and instrument counts were correct ×2 at the end of the procedure.    Kym Burnett MD     Date: 8/8/2018  Time: 12:14 PM

## 2018-08-08 NOTE — ANESTHESIA PROCEDURE NOTES
Airway  Urgency: elective    Date/Time: 8/8/2018 10:55 AM  Airway not difficult    General Information and Staff    Patient location during procedure: OR  CRNA: KAREL KURTZ    Indications and Patient Condition  Indications for airway management: airway protection    Preoxygenated: yes  MILS not maintained throughout  Mask difficulty assessment: 1 - vent by mask    Final Airway Details  Final airway type: endotracheal airway      Successful airway: ETT  Cuffed: yes   Successful intubation technique: video laryngoscopy  Facilitating devices/methods: intubating stylet  Endotracheal tube insertion site: oral  Blade: Olvin  Blade size: #3  ETT size: 7.0 mm  Cormack-Lehane Classification: grade IIb - view of arytenoids or posterior of glottis only  Placement verified by: chest auscultation and capnometry   Cuff volume (mL): 7  Measured from: teeth  ETT to teeth (cm): 21  Number of attempts at approach: 2    Additional Comments  Smooth IV induction.  Atraumatic ET intubation.  Dentition unchanged.  Negative epigastric sounds, Breath sound equal bilaterally with symmetric chest rise and fall

## 2018-08-08 NOTE — ANESTHESIA POSTPROCEDURE EVALUATION
"Patient: Mary Ambrosio    Procedure Summary     Date:  08/08/18 Room / Location:   [facility]    Anesthesia Start:  1043 Anesthesia Stop:  1219    Procedure:  BREAST IMPLANT REVISION BILATERAL - EXCISION OF REDUNTANT SKIN (Bilateral Breast) Diagnosis:      Surgeon:  Kym Burnett MD Provider:  Jaya Morin MD    Anesthesia Type:  general ASA Status:  2          Anesthesia Type: general  Last vitals  BP   133/69 (08/08/18 1218)   Temp   96.9 °F (36.1 °C) (08/08/18 1218)   Pulse   87 (08/08/18 1218)   Resp   14 (08/08/18 1218)     SpO2   94 % (08/08/18 1218)     Post Anesthesia Care and Evaluation    Patient location during evaluation: PACU  Patient participation: complete - patient participated  Level of consciousness: awake and alert  Pain score: 0  Pain management: adequate  Airway patency: patent  Anesthetic complications: No anesthetic complications  PONV Status: none  Cardiovascular status: hemodynamically stable and acceptable  Respiratory status: nonlabored ventilation, acceptable and nasal cannula  Hydration status: acceptable    Comments: Pt transported to PACU with O2 via nasal cannula at 4 L/MIN. Vital signs stable.  Pt shows no signs of distress.  Report given to PACU RN./69 (BP Location: Left leg)   Pulse 87   Temp 96.9 °F (36.1 °C) (Tympanic)   Resp 14   Ht 167.6 cm (66\")   Wt 99 kg (218 lb 4.1 oz)   SpO2 94%   BMI 35.23 kg/m²         "

## 2018-08-08 NOTE — ANESTHESIA PREPROCEDURE EVALUATION
Anesthesia Evaluation     Patient summary reviewed and Nursing notes reviewed                Airway   Mallampati: I  TM distance: >3 FB  Neck ROM: full  No difficulty expected  Dental      Pulmonary - negative pulmonary ROS   Cardiovascular - negative cardio ROS    ECG reviewed        Neuro/Psych  (+) headaches, psychiatric history Depression,     GI/Hepatic/Renal/Endo - negative ROS     Musculoskeletal (-) negative ROS    Abdominal    Substance History - negative use     OB/GYN negative ob/gyn ROS         Other                        Anesthesia Plan    ASA 2     general     intravenous induction   Anesthetic plan and risks discussed with patient.    Plan discussed with CRNA.

## 2018-08-08 NOTE — H&P
Pre-Op H&P  Mary Ambrosio  [id number]  1975    Chief complaint: History of breast cancer, redundant skin    HPI:    Patient is a 43 y.o.female presents with history of breast cancer, bilateral breast implants on 12/13/17, and subsequent redundant skin. She has elected to proceed with bilateral breast implant revision, excision of redundant skin    Review of Systems:  General ROS: negative for chills, fever or skin lesions;  No changes since last office visit  Cardiovascular ROS: no chest pain or dyspnea on exertion  Respiratory ROS: no cough, shortness of breath, or wheezing    Allergies:   Allergies   Allergen Reactions   • Taxotere [Docetaxel] Hives and Shortness Of Breath   • Tegaderm Ag Mesh [Silver] Rash       Home Meds:    No current facility-administered medications on file prior to encounter.      Current Outpatient Prescriptions on File Prior to Encounter   Medication Sig Dispense Refill   • atenolol (TENORMIN) 25 MG tablet Take 1 tablet by mouth Daily. 30 tablet 2   • SUMAtriptan (IMITREX) 50 MG tablet Take one tablet at onset of headache. May repeat dose one time in 2 hours if headache not relieved. 5 tablet 1       PMH:   Past Medical History:   Diagnosis Date   • Cancer (CMS/HCC) 02/2017    breast   • Depression    • Disease of thyroid gland    • Hot flashes    • Migraines    • Seasonal allergies      PSH:    Past Surgical History:   Procedure Laterality Date   • BREAST BIOPSY Left 01/20/2017   • BREAST LUMPECTOMY Left 2/8/2018   • BREAST RECONSTRUCTION, BREAST TISSUE EXPANDER INSERTION Bilateral 4/12/2017    Procedure: BILATERAL IMMED STAGED BREAST RECONSTRUCTION WITH EXPANDERS AND ALLODERM;  Surgeon: Kym Burnett MD;  Location:  [facility];  Service:    • BREAST RECONSTRUCTION, BREAST TISSUE EXPANDER REMOVAL, IMPLANT INSERTION Bilateral 12/13/2017    Procedure: EXCHANGE BILATERAL TISSUE EXPANDERS  TO PERMANENT IMPLANTS;  Surgeon: Kym Burnett MD;  Location:  [facility];  Service:    •  "CHOLECYSTECTOMY  ~5/2018   • DILATATION AND CURETTAGE  1999   • HYSTEROSCOPY ENDOMETRIAL ABLATION TUBAL STERILIZATION  07/2014   • LASER ABLATION     • LIPOMA EXCISION  07/2014    Right rib cage   • MASTECTOMY WITH SENTINEL NODE BIOPSY AND AXILLARY NODE DISSECTION Bilateral 4/12/2017    Procedure: LEFT BREAST MASTECTOMY WITH LEFT SENTINEL NODE BIOPSY AND POSS AXILLARY NODE DISSECTION, RIGHT PROPHYLATIC MASTECTOMY;  Surgeon: Patrice Almonte MD;  Location: [facility];  Service:    • TONSILLECTOMY  1992   • TUBAL ABDOMINAL LIGATION         Social History:   Tobacco:   History   Smoking Status   • Former Smoker   • Packs/day: 1.00   • Years: 10.00   • Types: Cigarettes   • Quit date: 2004   Smokeless Tobacco   • Never Used      Alcohol:     History   Alcohol Use   • Yes     Comment: 1 COCKTAIL PER WEEK       Vitals:           /64 (BP Location: Right arm, Patient Position: Sitting)   Pulse 68   Ht 167.6 cm (66\")   Wt 99 kg (218 lb 4.1 oz)   SpO2 96%   BMI 35.23 kg/m²     Physical Exam:  General Appearance:    Alert, cooperative, no distress, appears stated age   Head:    Normocephalic, without obvious abnormality, atraumatic   Lungs:     Clear to auscultation bilaterally, respirations unlabored    Heart:   Regular rate and rhythm, S1 and S2 normal, no murmur, rub    or gallop    Abdomen:    Soft, non-tender.  +bowel sounds   Breast Exam:    deferred   Genitalia:    deferred   Extremities:   Extremities normal, atraumatic, no cyanosis or edema   Skin:   Skin color, texture, turgor normal, no rashes or lesions   Neurologic:   Grossly intact   Results Review  I reviewed the patient's new clinical results.    Cancer Staging (if applicable)  Cancer Patient: _X_ yes __no __unknown; If yes, clinical stage T:__ N:__M:__, stage group or __N/A    Impression: Breast cancer    Plan: Bilateral breast implant revision, excision of redundant skin    Oriana Owen, APRN   8/8/2018   10:32 AM  "

## 2018-08-09 LAB
CYTO UR: NORMAL
LAB AP CASE REPORT: NORMAL
LAB AP CLINICAL INFORMATION: NORMAL
PATH REPORT.FINAL DX SPEC: NORMAL
PATH REPORT.GROSS SPEC: NORMAL

## 2018-08-14 ENCOUNTER — OFFICE VISIT (OUTPATIENT)
Dept: ONCOLOGY | Facility: CLINIC | Age: 43
End: 2018-08-14

## 2018-08-14 VITALS
TEMPERATURE: 98.6 F | HEART RATE: 78 BPM | WEIGHT: 218 LBS | DIASTOLIC BLOOD PRESSURE: 56 MMHG | RESPIRATION RATE: 16 BRPM | BODY MASS INDEX: 35.03 KG/M2 | OXYGEN SATURATION: 96 % | SYSTOLIC BLOOD PRESSURE: 113 MMHG | HEIGHT: 66 IN

## 2018-08-14 DIAGNOSIS — C50.112 MALIGNANT NEOPLASM OF CENTRAL PORTION OF LEFT FEMALE BREAST, UNSPECIFIED ESTROGEN RECEPTOR STATUS (HCC): Primary | ICD-10-CM

## 2018-08-14 PROCEDURE — 99213 OFFICE O/P EST LOW 20 MIN: CPT | Performed by: INTERNAL MEDICINE

## 2018-08-14 NOTE — PROGRESS NOTES
"      PROBLEM LIST:  1. [id number] invasive ductal carcinoma of the left breast, ER positive MN positive HER-2 negative  A) the patient presented with skin dimpling under the left breast. A needle localized biopsy was done on 2/8/2017. Pathology showed a 2.2 cm invasive ductal carcinoma. There were focally positive margins.   Bilateral mastectomy done on 4/12/17.  Pathology showed residual in situ DICIS.  0/1 SLN involved.  [id number] (Stage IIA).  Oncotype score 34 in the high risk group.  Adjuvant chemotherapy with taxotere and cyclophophamide started 5/9/17.  Long delay between cycle 1 and cycle 2 due to mastectomy wound infection.  Severe allergic reaction after cycle 3 with hives, tightness in throat, and swelling of face that lasted for over 2 weeks despite high dose steroids.  CMF given for cycle #4.  B) tamoxifen started October 2017. Enrolled in  of everolimus versus placebo.  Treatment stopped due to grade 4 hypertriglyceridemia.    Subjective     HISTORY OF PRESENT ILLNESS:   Mary Ambrosio returns for follow-up.  She had a bone scan after her last visit that was unremarkable.the pain in her right thigh has improved.  The left neck lymph node is still there but decreased in size.  Overall she is feeling well.  No new complaints or concerns today.    Past Medical History, Past Surgical History, Social History, Family History have been reviewed and are without significant changes except as mentioned.    Review of Systems   A comprehensive 14 point review of systems was performed and was negative except as mentioned.    Medications:  The current medication list was reviewed in the EMR    ALLERGIES:    Allergies   Allergen Reactions   • Taxotere [Docetaxel] Hives and Shortness Of Breath   • Tegaderm Ag Mesh [Silver] Rash       Objective      /56 Comment: RUE  Pulse 78   Temp 98.6 °F (37 °C) (Temporal Artery )   Resp 16   Ht 167.6 cm (66\")   Wt 98.9 kg (218 lb)   SpO2 96% Comment: RA  BMI 35.19 " kg/m²      Performance Status: 0    General: well appearing female in no acute distress  Neuro: alert and oriented  HEENT: sclera anicteric, oropharynx clear  Lymphatics: no supraclavicular, or axillary adenopathy.  There is a single 8mm left upper cervical lymph node, nontender, improved from last visit  Cardiovascular: regular rate and rhythm, no murmurs  Lungs: clear to auscultation bilaterally  Abdomen: soft, nontender, nondistended.  No palpable organomegaly  Extremeties: no lower extremity edema.    Skin: No current rashes or urticaria.  Psych: Mood and affect appropriate        Assessment/Plan   Mary Ambrosio is a 43 y.o. year old female with stage II a ER positive NH positive HER-2 negative invasive ductal carcinoma of the left breast with a high risk oncotype.  She has completed chemotherapy and is now on tamoxifen.    Breast cancer: continue tamoxifen.  We will monitor the left neck LN clinically and consider repeat imaging if it is significantly increasing in size.    Hot flashes:  Continue Effexor        F/u end up September per study protocol.            Visit time was 15 minutes, greater than 50% spent in counseling      Nancy Caruso MD  [facility] Hematology and Oncology    8/14/2018          CC:

## 2018-09-19 ENCOUNTER — RESEARCH ENCOUNTER (OUTPATIENT)
Dept: ONCOLOGY | Facility: HOSPITAL | Age: 43
End: 2018-09-19

## 2018-09-19 NOTE — RESEARCH
Pt was called and verbally notified regarding changes to the risks associated with the ICF.  She verbalized understanding to the coordinator.      Corine Luna   Research RN

## 2018-09-21 ENCOUNTER — TELEPHONE (OUTPATIENT)
Dept: ONCOLOGY | Facility: CLINIC | Age: 43
End: 2018-09-21

## 2018-09-21 NOTE — TELEPHONE ENCOUNTER
----- Message from Stacy Javon sent at 9/20/2018 12:27 PM EDT -----  Regarding: [employer] - WORK RESTRICTIONS LIFTED  Contact: [phone]  PATIENT CALLED BECAUSE SHE WANTS THE WORK RESTRICTIONS FROM April LIFTED.  SHE WOULD LIKE THE NEW LETTER FAXED TO HER WORK -[phone] ATTN: EVELIA VAUGHN.

## 2018-10-01 NOTE — PROGRESS NOTES
PROBLEM LIST:  1. [id number] invasive ductal carcinoma of the left breast, ER positive AR positive HER-2 negative  A) the patient presented with skin dimpling under the left breast. A needle localized biopsy was done on 2/8/2017. Pathology showed a 2.2 cm invasive ductal carcinoma. There were focally positive margins.   Bilateral mastectomy done on 4/12/17.  Pathology showed residual in situ DICIS.  0/1 SLN involved.  [id number] (Stage IIA).  Oncotype score 34 in the high risk group.  Adjuvant chemotherapy with taxotere and cyclophophamide started 5/9/17.  Long delay between cycle 1 and cycle 2 due to mastectomy wound infection.  Severe allergic reaction after cycle 3 with hives, tightness in throat, and swelling of face that lasted for over 2 weeks despite high dose steroids.  CMF given for cycle #4.  B) tamoxifen started October 2017. Enrolled in  of everolimus versus placebo.  Treatment stopped due to grade 4 hypertriglyceridemia.    Subjective     HISTORY OF PRESENT ILLNESS:   Mary Ambrosio returns for follow-up.  She says she has been feeling pretty well.  She heard from her employer that she would lose her job next week if she did not have all of her restrictions lifted.  Since April, she has had improvement in her strength and hand function.  She thinks she would like to try going back to work and thinks she will be able to do the job.  She has less pain in her hands but still notices a little bit of fatigue after using them for an extended period.        Past Medical History, Past Surgical History, Social History, Family History have been reviewed and are without significant changes except as mentioned.    Review of Systems   A comprehensive 14 point review of systems was performed and was negative except as mentioned.    Medications:  The current medication list was reviewed in the EMR    ALLERGIES:    Allergies   Allergen Reactions   • Taxotere [Docetaxel] Hives and Shortness Of Breath   •  Tegaderm Ag Mesh [Silver] Rash       Objective      /59   Pulse 63   Temp 98.1 °F (36.7 °C) (Temporal Artery )   Resp 14   Wt 99.3 kg (219 lb)   SpO2 99%   BMI 35.35 kg/m²      Performance Status: 0    General: well appearing female in no acute distress  Neuro: alert and oriented  HEENT: sclera anicteric, oropharynx clear  Lymphatics: no supraclavicular, or axillary adenopathy.  There is a single 8mm left upper cervical lymph node, nontender, improved from last visit  Cardiovascular: regular rate and rhythm, no murmurs  Lungs: clear to auscultation bilaterally  Abdomen: soft, nontender, nondistended.  No palpable organomegaly  Extremeties: no lower extremity edema.    Skin: No current rashes or urticaria.  Psych: Mood and affect appropriate    Labs: CBC and CMP wnl    Assessment/Plan   Mary Ambrosio is a 43 y.o. year old female with stage II a ER positive AR positive HER-2 negative invasive ductal carcinoma of the left breast who returns for follow up on tamoxifen.   she's doing well and has no evidence of recurrence.    Breast cancer: continue tamoxifen.     Hot flashes:  Continue Effexor    Her neuropathy symptoms have improved significantly over the past several months.  At this point I think it is safe for her to return to work with no restrictions.  We will send a letter today to this effect.    Follow up December.  Per research protocol after that she will need to be seen in 6 weeks.      Visit time was 15 minutes, greater than 50% spent in counseling      Nancy Caruso MD  [facility] Hematology and Oncology    10/02/2018          CC:

## 2018-10-02 ENCOUNTER — LAB (OUTPATIENT)
Dept: LAB | Facility: HOSPITAL | Age: 43
End: 2018-10-02

## 2018-10-02 ENCOUNTER — OFFICE VISIT (OUTPATIENT)
Dept: ONCOLOGY | Facility: CLINIC | Age: 43
End: 2018-10-02

## 2018-10-02 VITALS
OXYGEN SATURATION: 99 % | RESPIRATION RATE: 14 BRPM | SYSTOLIC BLOOD PRESSURE: 121 MMHG | WEIGHT: 219 LBS | HEART RATE: 63 BPM | DIASTOLIC BLOOD PRESSURE: 59 MMHG | TEMPERATURE: 98.1 F | BODY MASS INDEX: 35.35 KG/M2

## 2018-10-02 DIAGNOSIS — C50.112 MALIGNANT NEOPLASM OF CENTRAL PORTION OF LEFT FEMALE BREAST, UNSPECIFIED ESTROGEN RECEPTOR STATUS (HCC): ICD-10-CM

## 2018-10-02 DIAGNOSIS — C50.112 MALIGNANT NEOPLASM OF CENTRAL PORTION OF LEFT FEMALE BREAST, UNSPECIFIED ESTROGEN RECEPTOR STATUS (HCC): Primary | ICD-10-CM

## 2018-10-02 LAB
ALBUMIN SERPL-MCNC: 4.13 G/DL (ref 3.2–4.8)
ALBUMIN/GLOB SERPL: 1.8 G/DL (ref 1.5–2.5)
ALP SERPL-CCNC: 83 U/L (ref 25–100)
ALT SERPL W P-5'-P-CCNC: 37 U/L (ref 7–40)
ANION GAP SERPL CALCULATED.3IONS-SCNC: 9 MMOL/L (ref 3–11)
AST SERPL-CCNC: 29 U/L (ref 0–33)
BILIRUB SERPL-MCNC: 0.2 MG/DL (ref 0.3–1.2)
BUN BLD-MCNC: 13 MG/DL (ref 9–23)
BUN/CREAT SERPL: 13.1 (ref 7–25)
CALCIUM SPEC-SCNC: 8.9 MG/DL (ref 8.7–10.4)
CHLORIDE SERPL-SCNC: 105 MMOL/L (ref 99–109)
CO2 SERPL-SCNC: 23 MMOL/L (ref 20–31)
CREAT BLD-MCNC: 0.99 MG/DL (ref 0.6–1.3)
ERYTHROCYTE [DISTWIDTH] IN BLOOD BY AUTOMATED COUNT: 13.2 % (ref 11.3–14.5)
GFR SERPL CREATININE-BSD FRML MDRD: 62 ML/MIN/1.73
GLOBULIN UR ELPH-MCNC: 2.3 GM/DL
GLUCOSE BLD-MCNC: 106 MG/DL (ref 70–100)
HCT VFR BLD AUTO: 42.1 % (ref 34.5–44)
HGB BLD-MCNC: 13.6 G/DL (ref 11.5–15.5)
LYMPHOCYTES # BLD AUTO: 2.4 10*3/MM3 (ref 0.6–4.8)
LYMPHOCYTES NFR BLD AUTO: 33.5 % (ref 24–44)
MCH RBC QN AUTO: 28.2 PG (ref 27–31)
MCHC RBC AUTO-ENTMCNC: 32.4 G/DL (ref 32–36)
MCV RBC AUTO: 87 FL (ref 80–99)
MONOCYTES # BLD AUTO: 0.4 10*3/MM3 (ref 0–1)
MONOCYTES NFR BLD AUTO: 5.2 % (ref 0–12)
NEUTROPHILS # BLD AUTO: 4.4 10*3/MM3 (ref 1.5–8.3)
NEUTROPHILS NFR BLD AUTO: 61.3 % (ref 41–71)
PLATELET # BLD AUTO: 256 10*3/MM3 (ref 150–450)
PMV BLD AUTO: 8.1 FL (ref 6–12)
POTASSIUM BLD-SCNC: 4 MMOL/L (ref 3.5–5.5)
PROT SERPL-MCNC: 6.4 G/DL (ref 5.7–8.2)
RBC # BLD AUTO: 4.84 10*6/MM3 (ref 3.89–5.14)
SODIUM BLD-SCNC: 137 MMOL/L (ref 132–146)
WBC NRBC COR # BLD: 7.2 10*3/MM3 (ref 3.5–10.8)

## 2018-10-02 PROCEDURE — 85025 COMPLETE CBC W/AUTO DIFF WBC: CPT

## 2018-10-02 PROCEDURE — 80053 COMPREHEN METABOLIC PANEL: CPT

## 2018-10-02 PROCEDURE — 36415 COLL VENOUS BLD VENIPUNCTURE: CPT

## 2018-10-02 PROCEDURE — 99213 OFFICE O/P EST LOW 20 MIN: CPT | Performed by: INTERNAL MEDICINE

## 2018-10-04 ENCOUNTER — DOCUMENTATION (OUTPATIENT)
Dept: ONCOLOGY | Facility: CLINIC | Age: 43
End: 2018-10-04

## 2018-10-25 RX ORDER — TAMOXIFEN CITRATE 20 MG/1
TABLET ORAL
Qty: 30 TABLET | Refills: 11 | Status: SHIPPED | OUTPATIENT
Start: 2018-10-25 | End: 2019-11-14 | Stop reason: SDUPTHER

## 2018-12-19 ENCOUNTER — OFFICE VISIT (OUTPATIENT)
Dept: ONCOLOGY | Facility: CLINIC | Age: 43
End: 2018-12-19

## 2018-12-19 VITALS
BODY MASS INDEX: 34.55 KG/M2 | OXYGEN SATURATION: 96 % | TEMPERATURE: 97 F | HEART RATE: 86 BPM | HEIGHT: 66 IN | SYSTOLIC BLOOD PRESSURE: 114 MMHG | RESPIRATION RATE: 16 BRPM | WEIGHT: 215 LBS | DIASTOLIC BLOOD PRESSURE: 53 MMHG

## 2018-12-19 DIAGNOSIS — C50.112 MALIGNANT NEOPLASM OF CENTRAL PORTION OF LEFT FEMALE BREAST, UNSPECIFIED ESTROGEN RECEPTOR STATUS (HCC): Primary | ICD-10-CM

## 2018-12-19 PROCEDURE — 99213 OFFICE O/P EST LOW 20 MIN: CPT | Performed by: INTERNAL MEDICINE

## 2018-12-19 NOTE — PROGRESS NOTES
"      PROBLEM LIST:  1. [id number] invasive ductal carcinoma of the left breast, ER positive VT positive HER-2 negative  A) the patient presented with skin dimpling under the left breast. A needle localized biopsy was done on 2/8/2017. Pathology showed a 2.2 cm invasive ductal carcinoma. There were focally positive margins.   Bilateral mastectomy done on 4/12/17.  Pathology showed residual in situ DICIS.  0/1 SLN involved.  [id number] (Stage IIA).  Oncotype score 34 in the high risk group.  Adjuvant chemotherapy with taxotere and cyclophophamide started 5/9/17.  Long delay between cycle 1 and cycle 2 due to mastectomy wound infection.  Severe allergic reaction after cycle 3 with hives, tightness in throat, and swelling of face that lasted for over 2 weeks despite high dose steroids.  CMF given for cycle #4.  B) tamoxifen started October 2017. Enrolled in  of everolimus versus placebo.  Treatment stopped due to grade 4 hypertriglyceridemia.    Subjective     HISTORY OF PRESENT ILLNESS:   Mary Ambrosio returns for follow-up.  She says she has been feeling pretty well.  She has returned to work and is doing okay with this.  She is able to do the job despite mild persistent neuropathy.  She continues to have hot flashes.  She does not have any menstrual bleeding after an ablation several years ago.        Past Medical History, Past Surgical History, Social History, Family History have been reviewed and are without significant changes except as mentioned.    Review of Systems   A comprehensive 14 point review of systems was performed and was negative except as mentioned.    Medications:  The current medication list was reviewed in the EMR    ALLERGIES:    Allergies   Allergen Reactions   • Taxotere [Docetaxel] Hives and Shortness Of Breath   • Tegaderm Ag Mesh [Silver] Rash       Objective      /53 Comment: RUE  Pulse 86   Temp 97 °F (36.1 °C) (Temporal)   Resp 16   Ht 167.6 cm (66\")   Wt 97.5 kg (215 lb)  "  SpO2 96% Comment: RA  BMI 34.70 kg/m²      Performance Status: 0    General: well appearing female in no acute distress  Neuro: alert and oriented  HEENT: sclera anicteric, oropharynx clear  Lymphatics: no supraclavicular, or axillary adenopathy.  Cardiovascular: regular rate and rhythm, no murmurs  Lungs: clear to auscultation bilaterally  Abdomen: soft, nontender, nondistended.  No palpable organomegaly  Extremeties: no lower extremity edema.    Skin: No current rashes or urticaria.  Psych: Mood and affect appropriate        Assessment/Plan   Mary Ambrosio is a 43 y.o. year old female with stage II a ER positive OH positive HER-2 negative invasive ductal carcinoma of the left breast who returns for follow up on tamoxifen.  she continues to do well with no evidence of disease recurrence.  We discussed options for adjuvant hormonal therapy including ovarian suppression plus an aromatase inhibitor.  In the trials that showed the most benefit in women who are under the age of 35 who had high risk disease.  I think in her case tamoxifen is a reasonable option.  That being said if she once to be more aggressive with her cancer treatment removing her ovaries and switching to an aromatase inhibitor is possible.  She says for now she will continue tamoxifen.    Hot flashes:  Continue Effexor      F/u 6 weeks.      I spent 15 minutes with the patient. I spent > 50% percent of this time counseling and discussing prognosis, diagnostic testing, evaluation, current status and management.        Nacny Caruso MD  [facility]    10/02/2018          CC:

## 2019-01-16 ENCOUNTER — HOSPITAL ENCOUNTER (OUTPATIENT)
Dept: PHYSICAL THERAPY | Facility: HOSPITAL | Age: 44
Setting detail: THERAPIES SERIES
Discharge: HOME | End: 2019-01-16

## 2019-01-16 ENCOUNTER — TRANSCRIBE ORDERS (OUTPATIENT)
Dept: PHYSICAL THERAPY | Facility: HOSPITAL | Age: 44
End: 2019-01-16

## 2019-01-16 DIAGNOSIS — Z90.12 STATUS POST LEFT MASTECTOMY: ICD-10-CM

## 2019-01-16 DIAGNOSIS — L90.5 SCAR CONDITION AND FIBROSIS OF SKIN: Primary | ICD-10-CM

## 2019-01-16 DIAGNOSIS — Z90.13 STATUS POST BILATERAL MASTECTOMY: Primary | ICD-10-CM

## 2019-01-16 PROCEDURE — 97161 PT EVAL LOW COMPLEX 20 MIN: CPT

## 2019-01-16 NOTE — THERAPY EVALUATION
Physical Therapy Lymphedema Initial Evaluation   [place]     Patient Name: Mary Ambrosio  : 1975  MRN: [id number]  Today's Date: 2019      Visit Date: 2019    Visit Dx:    ICD-10-CM ICD-9-CM   1. Scar condition and fibrosis of skin L90.5 709.2       Patient Active Problem List   Diagnosis   • Breast cancer, left (CMS/HCC)        Past Medical History:   Diagnosis Date   • Cancer (CMS/HCC) 2017    breast   • Depression    • Disease of thyroid gland    • Hot flashes    • Migraines    • Seasonal allergies         Past Surgical History:   Procedure Laterality Date   • BREAST BIOPSY Left 2017   • BREAST IMPLANT SURGERY Bilateral 2018    Procedure: BREAST IMPLANT REVISION BILATERAL - EXCISION OF REDUNTANT SKIN;  Surgeon: Kym Burnett MD;  Location:  [facility];  Service: Plastics   • BREAST LUMPECTOMY Left 2018   • BREAST RECONSTRUCTION, BREAST TISSUE EXPANDER INSERTION Bilateral 2017    Procedure: BILATERAL IMMED STAGED BREAST RECONSTRUCTION WITH EXPANDERS AND ALLODERM;  Surgeon: Kym Burnett MD;  Location:  [facility];  Service:    • BREAST RECONSTRUCTION, BREAST TISSUE EXPANDER REMOVAL, IMPLANT INSERTION Bilateral 2017    Procedure: EXCHANGE BILATERAL TISSUE EXPANDERS  TO PERMANENT IMPLANTS;  Surgeon: Kym Burnett MD;  Location:  [facility];  Service:    • CHOLECYSTECTOMY  ~2018   • DILATATION AND CURETTAGE     • HYSTEROSCOPY ENDOMETRIAL ABLATION TUBAL STERILIZATION  2014   • LASER ABLATION     • LIPOMA EXCISION  2014    Right rib cage   • MASTECTOMY WITH SENTINEL NODE BIOPSY AND AXILLARY NODE DISSECTION Bilateral 2017    Procedure: LEFT BREAST MASTECTOMY WITH LEFT SENTINEL NODE BIOPSY AND POSS AXILLARY NODE DISSECTION, RIGHT PROPHYLATIC MASTECTOMY;  Surgeon: Patrice Almonte MD;  Location:  [facility];  Service:    • TONSILLECTOMY     • TUBAL ABDOMINAL LIGATION         Visit Dx:    ICD-10-CM ICD-9-CM   1. Scar condition and  fibrosis of skin L90.5 709.2       Patient History     Row Name 01/16/19 0900             History    Chief Complaint  Tightness  -LF      Date Current Problem(s) Began  -- August 2018  -LF      Brief Description of Current Complaint  Unique presents with left breast tightness s/p reconstruction and that the left breast sits higher.  She denies pain, but does note tightness lateral chest with overhead movements.  Unique had B mastectomy for L breast cancer 4/12/17 with expander placement; expander to implant exchanged 12/13/18; and revision with excision of redundant skin 8/8/18.    -LF      Patient/Caregiver Goals  -- improved tightness, symmetry  -LF      Hand Dominance  left-handed  -LF      Occupation/sports/leisure activities  Works in "[employer] at [employer] - no lifting over 10lbs  -LF         Pain     Pain Comments  denies pain  -LF         Fall Risk Assessment    Any falls in the past year:  Unspecified  -LF         Daily Activities    Primary Language  English  -LF      Are you able to read  Yes  -LF      Are you able to write  Yes  -LF      How does patient learn best?  Listening;Reading  -LF      Teaching needs identified  Management of Condition  -LF      Barriers to learning  None  -LF      Pt Participated in POC and Goals  Yes  -LF        User Key  (r) = Recorded By, (t) = Taken By, (c) = Cosigned By    Initials Name Provider Type    LF Kira Christensen, PT Physical Therapist          Lymphedema     Row Name 01/16/19 0900             Lymphedema Assessment    Stage of Cancer  Stage II  -LF      Cancer Comments  lumpectomy, then bilateral mastectomy April 2017 with expander placement.  Had slowed healing right breast incision.   -LF         General ROM    GENERAL ROM COMMENTS  ROM is WFL's.  Noted mild limitation left shoulder flexion compared to right.  She reports tightness left lateral chest with flex, abuction, and functional ER HBH Mild restriction with posterior shoulder roll on left  -LF         Skin  Changes/Observations    Skin Observations Comment  skin is WNL's.  She has well healed incisions that are pink  -LF         Lymphedema Sensation    Lymphedema Sensation Tests  light touch  -LF      Lymphedema Light Touch  WNL  -LF      Lymphedema Sensation Comments  except numbness left tricep region.  She has tingling/numbness tips of fingers and toes related to chemo neuropathy.  Reports this has improved since onset.  -LF         Manual Lymphatic Drainage    Manual Lymphatic Drainage  astym  -LF      Astym  other astym  -LF      Other Astym  Supine for ASTYM around left breast, working with arm at side then overhead.  Then position in right sidelying to ASTYM along left lateral chest.  Followed with STM same areas  -LF        User Key  (r) = Recorded By, (t) = Taken By, (c) = Cosigned By    Initials Name Provider Type    Kira Muñoz PT Physical Therapist            PT Ortho     Row Name 01/16/19 0900       Posture/Observations    Posture/Observations Comments  left shoulder more forward than right.  In supine:  shoulder to table distance R 4 fingers, L 5 finger  -LF       Special Tests/Palpation    Special Tests/Palpation  -- no tenderness with palapation  -LF       MMT (Manual Muscle Testing)    General MMT Comments  UE strength is 5/5  -LF        Strength Right    # Reps  3  -LF    Right Rung  2  -LF    Right  Test 1  41  -LF    Right  Test 2  46  -LF    Right  Test 3  45  -LF     Strength Average Right  44  -LF        Strength Left    # Reps  3  -LF    Left Rung  2  -LF    Left  Test 1  63  -LF    Left  Test 2  45  -LF    Left  Test 3  60  -LF     Strength Average Left  56  -LF       Hand  Strength     Strength Affected Side  Bilateral  -LF      User Key  (r) = Recorded By, (t) = Taken By, (c) = Cosigned By    Initials Name Provider Type    Kira Muñoz PT Physical Therapist          Therapy Education  Education Details: Provided HEP for HB  supine stretch, and seated HBH sidebend and rotation; also supine shoulder flex hands clasped  Given: HEP  Program: New  How Provided: Verbal, Demonstration, Written  Provided to: Patient  Level of Understanding: Teach back education performed          PT OP Goals     Row Name 01/16/19 0900          PT Short Term Goals    STG Date to Achieve  02/13/19  -LF     STG 1  Soft tissue swelling, inflammation, or restriction will be reduced by 25 %   -LF     STG 1 Progress  New  -LF     STG 2  Flexibility will be improved to WFL.  -LF     STG 2 Progress  New  -LF     STG 3  Patient to report 1/5 on the DASH score for placing an object above her head.  -LF     STG 3 Progress  New  -LF        Long Term Goals    LTG 1  Soft tissue swelling, inflammation, or restriction will be reduced by 75 %   -LF     LTG 1 Progress  New  -LF     LTG 2  Patient to be independent with the scar tissue techniques  -LF     LTG 2 Progress  New  -LF     LTG 3  DASH score improved by 10%  -LF     LTG 3 Progress  New  -LF        Time Calculation    PT Goal Re-Cert Due Date  04/16/19  -LF       User Key  (r) = Recorded By, (t) = Taken By, (c) = Cosigned By    Initials Name Provider Type    LF Kira Christensen, PT Physical Therapist          PT Assessment/Plan     Row Name 01/16/19 0900          PT Assessment    Functional Limitations  Other (comment)  -LF     Impairments  Impaired flexibility;Pain;Range of motion;Sensation  -LF     Assessment Comments  Patient presents with soft-tissue restriction and impaired flexibility on left following breast reconstruction.  Further treatment indicated with focus on manual therapy and ASTYM to address restriction and help improve symmetry.  -LF     Please refer to paper survey for additional self-reported information  Yes  -LF     Rehab Potential  Good  -LF     Patient/caregiver participated in establishment of treatment plan and goals  Yes  -LF        PT Plan    PT Frequency  2x/week  -LF     Predicted Duration  of Therapy Intervention (Therapy Eval)  6-8 weeks  -LF     Planned CPT's?  PT EVAL LOW COMPLEXITY: 93571;PT THER PROC EA 15 MIN: 22252;PT MANUAL THERAPY EA 15 MIN: 29254  -LF     PT Plan Comments  cont. 1-2x/week to address tightness left breast  -LF       User Key  (r) = Recorded By, (t) = Taken By, (c) = Cosigned By    Initials Name Provider Type    Kira Muñoz PT Physical Therapist             Outcome Measure Options: Disabilities of the Arm, Shoulder, and Hand (DASH)  DASH  Open a tight or new jar.: Mild Difficulty  Write: No Difficulty  Turn a key: No Difficulty  Prepare a meal: No Difficulty  Push open a heavy door: No Difficulty  Place an object on a shelf above your head: Mild Difficulty  Do heavy household chores (e.g., wash walls, wash floors): No Difficulty  Garden or do yard work: No Difficulty  Make a bed: No Difficulty  Carry a shopping bag or briefcase: No Difficulty  Carry a heavy object (over 10 lbs): Mild Difficulty  Change a lightbulb overhead: No Difficulty  Wash or blow dry your hair: No Difficulty  Wash your back: No Difficulty  Put on a pullover sweater: No Difficulty  Use a knife to cut food: No Difficulty  Recreational activities in which require little effort (e.g., cardplaying, knitting, etc.): No Difficulty  Recreational activities in which you take some force or impact through your arm, should or hand (e.g. golf, hammering, tennis, etc.): Mild Difficulty  Recreational Activities in which you move your arm freely (e.g., frisbee, badminton, etc.): No Difficulty  Manage transportation needs (getting from one place to another): No Difficulty  Sexual Activities: No Difficulty  During the past week, to what extent has your arm, shoulder, or hand problem interfered with your normal social activites with family, friends, neighbors or groups?: Not at all  During the past week, were you limited in your work or other regular daily activities as a result of your arm, shoulder or hand  problem?: Not limited at all  Arm, Shoulder, or hand pain: Mild  Arm, shoulder or hand pain when you performed any specific activity: Mild  Tingling (pins and needles) in your arm, shoulder, or hand: Mild  Weakness in your arm, shoulder or hand: Mild  Stiffness in your arm, shoulder or hand: Mild  During the past week, how much difficulty have you had sleeping because of the pain in your arm, shoulder or hand?: No difficulty  I feel less capable, less confident or less useful because of my arm, shoulder or hand problem: Neither Agree nor Disagree  DASH Sum : 41  Number of Questions Answered: 30  DASH Score: 9.17  Work Module (Optional)  Using your usual technique for your work?: No Difficulty  Doing your usual work because of arm, shoulder or hand pain?: No Difficulty  Doing your work as well as you would like?: No Difficulty  Spending your usual amount of time doing your work?: No Difficulty  Work Module Score: 0         Time Calculation:   Start Time: 0900   Therapy Suggested Charges     Code   Minutes Charges    None           Therapy Charges for Today     Code Description Service Date Service Provider Modifiers Qty    78465372718 HC PT EVAL LOW COMPLEXITY 4 1/16/2019 Kira Christensen, PT GP 1          PT G-Codes  Outcome Measure Options: Disabilities of the Arm, Shoulder, and Hand (DASH)         Kira Christensen, PT  1/16/2019

## 2019-01-22 ENCOUNTER — APPOINTMENT (OUTPATIENT)
Dept: PHYSICAL THERAPY | Facility: HOSPITAL | Age: 44
End: 2019-01-22

## 2019-01-23 ENCOUNTER — TELEPHONE (OUTPATIENT)
Dept: PHYSICAL THERAPY | Facility: HOSPITAL | Age: 44
End: 2019-01-23

## 2019-01-24 ENCOUNTER — APPOINTMENT (OUTPATIENT)
Dept: PHYSICAL THERAPY | Facility: HOSPITAL | Age: 44
End: 2019-01-24

## 2019-01-29 ENCOUNTER — HOSPITAL ENCOUNTER (OUTPATIENT)
Dept: PHYSICAL THERAPY | Facility: HOSPITAL | Age: 44
Setting detail: THERAPIES SERIES
Discharge: HOME | End: 2019-01-29

## 2019-01-29 DIAGNOSIS — L90.5 SCAR CONDITION AND FIBROSIS OF SKIN: Primary | ICD-10-CM

## 2019-01-29 PROCEDURE — 97140 MANUAL THERAPY 1/> REGIONS: CPT

## 2019-01-29 PROCEDURE — 97110 THERAPEUTIC EXERCISES: CPT

## 2019-01-29 NOTE — THERAPY TREATMENT NOTE
Outpatient Physical Therapy Lymphedema Treatment Note  [facility]     Patient Name: Mary Ambrosio  : 1975  MRN: [id number]  Today's Date: 2019        Visit Date: 2019    Visit Dx:    ICD-10-CM ICD-9-CM   1. Scar condition and fibrosis of skin L90.5 709.2       Patient Active Problem List   Diagnosis   • Breast cancer, left (CMS/HCC)        Lymphedema     Row Name 19 0950             Subjective Pain    Able to rate subjective pain?  yes  -LF      Pre-Treatment Pain Level  0  -LF      Post-Treatment Pain Level  0  -LF         Skin Changes/Observations    Skin Observations Comment  skin is WNL's.  Well healed incisions  -LF         Manual Lymphatic Drainage    Manual Lymphatic Drainage  astym  -LF      Astym  other astym  -LF      Other Astym  Supine for ASTYM around left breast, working with arm at side and arm overhead.  Then sidelying for ASTYM upper trap, scapular region, lateral chest.  Followed with STM and stretch around breast, and lateral chest  -LF        User Key  (r) = Recorded By, (t) = Taken By, (c) = Cosigned By    Initials Name Provider Type     Kira Christensen, PT Physical Therapist            PT Assessment/Plan     Row Name 19 0950          PT Assessment    Assessment Comments  tolerates ASTYM and stretches without complaint.  continues wiht tightness on left side  -LF        PT Plan    PT Plan Comments  cont. 1-2x/week  -       User Key  (r) = Recorded By, (t) = Taken By, (c) = Cosigned By    Initials Name Provider Type     iKra Christensen, PT Physical Therapist               Exercises     Row Name 19 0950             Subjective Pain    Able to rate subjective pain?  yes  -LF      Pre-Treatment Pain Level  0  -LF      Post-Treatment Pain Level  0  -LF         Total Minutes    07613 - PT Therapeutic Exercise Minutes  8  -LF      94225 - PT Manual Therapy Minutes  23  -LF         Exercise 1    Exercise Name 1  Ther ex:  sidelying abduction and  los. abd/add, supine HBH open/close, hands clasped shoulder flexion; seated overhead reach stretch, HBH sidebending stretch  -LF      Time 1  8  -LF        User Key  (r) = Recorded By, (t) = Taken By, (c) = Cosigned By    Initials Name Provider Type    LF Kira Christensen PT Physical Therapist            Time Calculation:   Start Time: 0950   Therapy Suggested Charges     Code   Minutes Charges    43299 (CPT®) Hc Pt Neuromusc Re Education Ea 15 Min      87514 (CPT®) Hc Pt Ther Proc Ea 15 Min 8 1    48972 (CPT®) Hc Gait Training Ea 15 Min      29061 (CPT®) Hc Pt Therapeutic Act Ea 15 Min      52785 (CPT®) Hc Pt Manual Therapy Ea 15 Min 23 1    99029 (CPT®) Hc Pt Ther Massage- Per 15 Min      14052 (CPT®) Hc Pt Iontophoresis Ea 15 Min      90432 (CPT®) Hc Pt Elec Stim Ea-Per 15 Min      68626 (CPT®) Hc Pt Ultrasound Ea 15 Min      83892 (CPT®) Hc Pt Self Care/Mgmt/Train Ea 15 Min      82726 (CPT®) Hc Pt Prosthetic (S) Train Initial Encounter, Each 15 Min      03895 (CPT®) Hc Orthotic(S) Mgmt/Train Initial Encounter, Each 15min      97955 (CPT®) Hc Pt Aquatic Therapy Ea 15 Min      91484 (CPT®) Hc Pt Orthotic(S)/Prosthetic(S) Encounter, Each 15 Min       (CPT®) Hc Pt Electrical Stim Unattended      Total  31 2        Therapy Charges for Today     Code Description Service Date Service Provider Modifiers Qty    51799926972 HC PT THER PROC EA 15 MIN 1/29/2019 Kira Christensen, PT GP 1    94274669974 HC PT MANUAL THERAPY EA 15 MIN 1/29/2019 Kira Christensen, PT GP 1                    Kira Christensen PT  1/29/2019

## 2019-01-31 ENCOUNTER — HOSPITAL ENCOUNTER (OUTPATIENT)
Dept: PHYSICAL THERAPY | Facility: HOSPITAL | Age: 44
Setting detail: THERAPIES SERIES
Discharge: HOME | End: 2019-01-31

## 2019-01-31 DIAGNOSIS — L90.5 SCAR CONDITION AND FIBROSIS OF SKIN: Primary | ICD-10-CM

## 2019-01-31 PROCEDURE — 97140 MANUAL THERAPY 1/> REGIONS: CPT

## 2019-02-05 ENCOUNTER — APPOINTMENT (OUTPATIENT)
Dept: PHYSICAL THERAPY | Facility: HOSPITAL | Age: 44
End: 2019-02-05

## 2019-02-07 ENCOUNTER — HOSPITAL ENCOUNTER (OUTPATIENT)
Dept: PHYSICAL THERAPY | Facility: HOSPITAL | Age: 44
Setting detail: THERAPIES SERIES
Discharge: HOME | End: 2019-02-07

## 2019-02-07 DIAGNOSIS — L90.5 SCAR CONDITION AND FIBROSIS OF SKIN: Primary | ICD-10-CM

## 2019-02-07 PROCEDURE — 97140 MANUAL THERAPY 1/> REGIONS: CPT

## 2019-02-07 PROCEDURE — 97110 THERAPEUTIC EXERCISES: CPT

## 2019-02-07 NOTE — THERAPY TREATMENT NOTE
Outpatient Physical Therapy Lymphedema Treatment Note  [facility]     Patient Name: Mary Ambrosio  : 1975  MRN: [id number]  Today's Date: 2019        Visit Date: 2019    Visit Dx:    ICD-10-CM ICD-9-CM   1. Scar condition and fibrosis of skin L90.5 709.2       Patient Active Problem List   Diagnosis   • Breast cancer, left (CMS/HCC)        Lymphedema     Row Name 19 0905             Subjective Pain    Able to rate subjective pain?  yes  -LF      Pre-Treatment Pain Level  0  -LF      Post-Treatment Pain Level  0  -LF         Subjective Comments    Subjective Comments  Patient reports tightness improved left shoulder and chest  -LF         Skin Changes/Observations    Skin Observations Comment  skin is WNL's  -LF         Manual Lymphatic Drainage    Manual Lymphatic Drainage  astym  -LF      Astym  other astym  -LF      Other Astym  Supine for ASTYM around left breast, with arm at side then arm overhead.  Then postioned right sidelying to treat left lateral chest, scapular region and upper trap.    -LF      Manual Therapy  followed ASTYM with STM including bending and lifing techniques  -LF        User Key  (r) = Recorded By, (t) = Taken By, (c) = Cosigned By    Initials Name Provider Type     Kira Christensen, PT Physical Therapist            PT Assessment/Plan     Row Name 19 09          PT Assessment    Assessment Comments  responding well to ASTYM with improvement noted in flexibility and symmetry  -LF        PT Plan    PT Plan Comments  cont. 1-2x/week.  Update POC next visit  -LF       User Key  (r) = Recorded By, (t) = Taken By, (c) = Cosigned By    Initials Name Provider Type     Kira Christensen, PT Physical Therapist               Exercises     Row Name 19 0905             Subjective Comments    Subjective Comments  Patient reports tightness improved left shoulder and chest  -LF         Subjective Pain    Able to rate subjective pain?  yes  -LF       Pre-Treatment Pain Level  0  -LF      Post-Treatment Pain Level  0  -LF         Total Minutes    92863 - PT Therapeutic Exercise Minutes  8  -LF      26121 - PT Manual Therapy Minutes  30  -LF         Exercise 1    Exercise Name 1  Updated HEP with foam roll series and patient demonstrated in clinic including stretch, horz. abd/add, alt. flex, punches and circles.  Also reviewed previous HEP exercises  -LF      Time 1  8  -LF        User Key  (r) = Recorded By, (t) = Taken By, (c) = Cosigned By    Initials Name Provider Type    LF Kira Christensen, PT Physical Therapist              Time Calculation:   Start Time: 0905   Therapy Suggested Charges     Code   Minutes Charges    21188 (CPT®) Hc Pt Neuromusc Re Education Ea 15 Min      80790 (CPT®) Hc Pt Ther Proc Ea 15 Min 8 1    31626 (CPT®) Hc Gait Training Ea 15 Min      63546 (CPT®) Hc Pt Therapeutic Act Ea 15 Min      88230 (CPT®) Hc Pt Manual Therapy Ea 15 Min 30 2    32872 (CPT®) Hc Pt Ther Massage- Per 15 Min      33805 (CPT®) Hc Pt Iontophoresis Ea 15 Min      67959 (CPT®) Hc Pt Elec Stim Ea-Per 15 Min      93835 (CPT®) Hc Pt Ultrasound Ea 15 Min      94136 (CPT®) Hc Pt Self Care/Mgmt/Train Ea 15 Min      58460 (CPT®) Hc Pt Prosthetic (S) Train Initial Encounter, Each 15 Min      76267 (CPT®) Hc Orthotic(S) Mgmt/Train Initial Encounter, Each 15min      01742 (CPT®) Hc Pt Aquatic Therapy Ea 15 Min      55363 (CPT®) Hc Pt Orthotic(S)/Prosthetic(S) Encounter, Each 15 Min       (CPT®) Hc Pt Electrical Stim Unattended      Total  38 3        Therapy Charges for Today     Code Description Service Date Service Provider Modifiers Qty    82686088741 HC PT THER PROC EA 15 MIN 2/7/2019 Kira Christensen, PT GP 1    75424832816 HC PT MANUAL THERAPY EA 15 MIN 2/7/2019 Kira Christensen, PT GP 2                    Kira Christensen, PT  2/7/2019

## 2019-02-14 ENCOUNTER — HOSPITAL ENCOUNTER (OUTPATIENT)
Dept: PHYSICAL THERAPY | Facility: HOSPITAL | Age: 44
Setting detail: THERAPIES SERIES
Discharge: HOME | End: 2019-02-14

## 2019-02-14 DIAGNOSIS — L90.5 SCAR CONDITION AND FIBROSIS OF SKIN: Primary | ICD-10-CM

## 2019-02-14 PROCEDURE — 97140 MANUAL THERAPY 1/> REGIONS: CPT

## 2019-02-14 NOTE — THERAPY PROGRESS REPORT/RE-CERT
Outpatient Physical Therapy Lymphedema Progress Note  [facility]     Patient Name: Mary Ambrosio  : 1975  MRN: [id number]  Today's Date: 2019        Visit Date: 2019    Visit Dx:    ICD-10-CM ICD-9-CM   1. Scar condition and fibrosis of skin L90.5 709.2       Patient Active Problem List   Diagnosis   • Breast cancer, left (CMS/HCC)        Lymphedema     Row Name 19 0950             Manual Lymphatic Drainage    Manual Lymphatic Drainage  initial sequence;astym  -LF      Initial Sequence  supraclavicular;shoulder collectors  -LF      Astym  other astym  -LF      Other Astym  Supine for ASTYM around left breast begining medial and superior with several strokes across lateral pectorals.  Then lateral and inferior.  Followed with STM same areas, including bending/stretching techniques  -LF        User Key  (r) = Recorded By, (t) = Taken By, (c) = Cosigned By    Initials Name Provider Type    LF Kira Christensen PT Physical Therapist            PT Ortho     Row Name 19 0950       Subjective Comments    Subjective Comments  Patient notes improvement in tightness and symmetry since starting therapy.   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  -LF       Subjective Pain    Able to rate subjective pain?  yes  -LF    Pre-Treatment Pain Level  0  -LF    Post-Treatment Pain Level  0  -LF       Posture/Observations    Posture/Observations Comments  supine shoulder to table distance R: 3finger L 3.5 fingers.    -LF       General ROM    GENERAL ROM COMMENTS  Mild limitation flexion and abduction compared to RUE with tightness noted per patient.  Left shoulder more forward with HBH  -LF        Strength Right    # Reps  3  -LF    Right Rung  2  -LF    Right  Test 1  67  -LF    Right  Test 2  68  -LF    Right  Test 3  66  -LF     Strength Average Right  67  -LF         Strength Left    # Reps  3  -LF    Left Rung  2  -LF    Left  Test 1  60  -LF    Left  Test 2  65  -LF    Left  Test 3  66  -LF     Strength Average Left  63.67  -LF      User Key  (r) = Recorded By, (t) = Taken By, (c) = Cosigned By    Initials Name Provider Type    LF Kira Christensen, PT Physical Therapist            PT Assessment/Plan     Row Name 02/14/19 0950          PT Assessment    Functional Limitations  Other (comment)  -LF     Impairments  Impaired flexibility;Pain;Range of motion;Sensation  -LF     Assessment Comments  Patient continues with some tightness and flexibility. Tissue has softened around left implant, and she feels some improvement in tightness with ROM.  She has follow-up with surgeon next week and advised her to discuss right-side concerns.  Good progress towards goals and will benefit from continued treatment to progress towards unmet goals.  -LF     Please refer to paper survey for additional self-reported information  Yes  -LF     Rehab Potential  Good  -LF     Patient/caregiver participated in establishment of treatment plan and goals  Yes  -LF        PT Plan    PT Frequency  2x/week  -LF     Predicted Duration of Therapy Intervention (Therapy Eval)  4-6 weeks  -LF     Planned CPT's?  PT RE-EVAL: 24826;PT THER PROC EA 15 MIN: 72120;PT MANUAL THERAPY EA 15 MIN: 72679  -LF     PT Plan Comments  cont. 1-2x/week.   -LF       User Key  (r) = Recorded By, (t) = Taken By, (c) = Cosigned By    Initials Name Provider Type     Kira Christensen PT Physical Therapist               Exercises     Row Name 02/14/19 1685             Subjective Comments    Subjective Comments  Patient notes improvement in tightness and symmetry since starting therapy.   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  -         Subjective Pain    Able to rate subjective pain?  yes  -LF      Pre-Treatment  Pain Level  0  -LF      Post-Treatment Pain Level  0  -LF         Total Minutes    55948 - PT Manual Therapy Minutes  38  -LF        User Key  (r) = Recorded By, (t) = Taken By, (c) = Cosigned By    Initials Name Provider Type    Kira Muñoz PT Physical Therapist                 Manual Rx (last 36 hours)      Manual Treatments     Row Name 02/14/19 0950             Total Minutes    88362 - PT Manual Therapy Minutes  38  -LF        User Key  (r) = Recorded By, (t) = Taken By, (c) = Cosigned By    Initials Name Provider Type     Kira Christensen, PT Physical Therapist          PT OP Goals     Row Name 02/14/19 0950          PT Short Term Goals    STG Date to Achieve  02/13/19  -LF     STG 1  Soft tissue swelling, inflammation, or restriction will be reduced by 25 %   -LF     STG 1 Progress  Met  -     STG 2  Flexibility will be improved to WFL.  -LF     STG 2 Progress  Progressing;Ongoing  -LF     STG 3  Patient to report 1/5 on the DASH score for placing an object above her head.  -LF     STG 3 Progress  Met  -        Long Term Goals    LTG 1  Soft tissue swelling, inflammation, or restriction will be reduced by 75 %   -LF     LTG 1 Progress  Progressing;Ongoing  -LF     LTG 2  Patient to be independent with the scar tissue techniques  -LF     LTG 2 Progress  Partially Met  -LF     LTG 3  DASH score improved by 10%  -LF     LTG 3 Progress  Ongoing  -LF     LTG 3 Progress Comments  Increased socre on DASH today - noted in particular with work and activity limitation questions  -LF       User Key  (r) = Recorded By, (t) = Taken By, (c) = Cosigned By    Initials Name Provider Type    Kira Muñoz PT Physical Therapist          Therapy Education  Given: HEP, Symptoms/condition management  Program: Reinforced  How Provided: Verbal  Provided to: Patient  Level of Understanding: Verbalized    Outcome Measure Options: Disabilities of the Arm, Shoulder, and Hand (DASH)  DASH  Open a tight or new  jar.: Mild Difficulty  Write: No Difficulty  Turn a key: No Difficulty  Prepare a meal: No Difficulty  Push open a heavy door: No Difficulty  Place an object on a shelf above your head: No Difficulty  Do heavy household chores (e.g., wash walls, wash floors): Mild Difficulty  Garden or do yard work: No Difficulty  Make a bed: No Difficulty  Carry a shopping bag or briefcase: No Difficulty  Carry a heavy object (over 10 lbs): Mild Difficulty  Change a lightbulb overhead: No Difficulty  Wash or blow dry your hair: No Difficulty  Wash your back: Mild Difficulty  Put on a pullover sweater: No Difficulty  Use a knife to cut food: No Difficulty  Recreational activities in which require little effort (e.g., cardplaying, knitting, etc.): No Difficulty  Recreational activities in which you take some force or impact through your arm, should or hand (e.g. golf, hammering, tennis, etc.): Mild Difficulty  Recreational Activities in which you move your arm freely (e.g., frisbee, badminton, etc.): Mild Difficulty  Manage transportation needs (getting from one place to another): No Difficulty  Sexual Activities: No Difficulty  During the past week, to what extent has your arm, shoulder, or hand problem interfered with your normal social activites with family, friends, neighbors or groups?: Slightly  During the past week, were you limited in your work or other regular daily activities as a result of your arm, shoulder or hand problem?: Slightly Limited  Arm, Shoulder, or hand pain: None  Arm, shoulder or hand pain when you performed any specific activity: Mild  Tingling (pins and needles) in your arm, shoulder, or hand: Mild  Weakness in your arm, shoulder or hand: Mild  Stiffness in your arm, shoulder or hand: Mild  During the past week, how much difficulty have you had sleeping because of the pain in your arm, shoulder or hand?: No difficulty  I feel less capable, less confident or less useful because of my arm, shoulder or hand  problem: Agree  DASH Sum : 45  Number of Questions Answered: 30  DASH Score: 12.5         Time Calculation:   Start Time: 0950   Therapy Suggested Charges     Code   Minutes Charges    17428 (CPT®) Hc Pt Neuromusc Re Education Ea 15 Min      17167 (CPT®) Hc Pt Ther Proc Ea 15 Min      11185 (CPT®) Hc Gait Training Ea 15 Min      31460 (CPT®) Hc Pt Therapeutic Act Ea 15 Min      22382 (CPT®) Hc Pt Manual Therapy Ea 15 Min 38 3    37495 (CPT®) Hc Pt Ther Massage- Per 15 Min      29199 (CPT®) Hc Pt Iontophoresis Ea 15 Min      78755 (CPT®) Hc Pt Elec Stim Ea-Per 15 Min      79091 (CPT®) Hc Pt Ultrasound Ea 15 Min      49397 (CPT®) Hc Pt Self Care/Mgmt/Train Ea 15 Min      49821 (CPT®) Hc Pt Prosthetic (S) Train Initial Encounter, Each 15 Min      86111 (CPT®) Hc Orthotic(S) Mgmt/Train Initial Encounter, Each 15min      12128 (CPT®) Hc Pt Aquatic Therapy Ea 15 Min      29692 (CPT®) Hc Pt Orthotic(S)/Prosthetic(S) Encounter, Each 15 Min       (CPT®) Hc Pt Electrical Stim Unattended      Total  38 3        Therapy Charges for Today     Code Description Service Date Service Provider Modifiers Qty    13773853609 HC PT MANUAL THERAPY EA 15 MIN 2/14/2019 Kira Christensen, PT GP 3          PT G-Codes  Outcome Measure Options: Disabilities of the Arm, Shoulder, and Hand (DASH)         Kira Christensen, PT  2/14/2019

## 2019-02-28 ENCOUNTER — HOSPITAL ENCOUNTER (OUTPATIENT)
Dept: PHYSICAL THERAPY | Facility: HOSPITAL | Age: 44
Setting detail: THERAPIES SERIES
Discharge: HOME | End: 2019-02-28

## 2019-02-28 DIAGNOSIS — L90.5 SCAR CONDITION AND FIBROSIS OF SKIN: Primary | ICD-10-CM

## 2019-02-28 PROCEDURE — 97140 MANUAL THERAPY 1/> REGIONS: CPT

## 2019-03-06 ENCOUNTER — OFFICE VISIT (OUTPATIENT)
Dept: ONCOLOGY | Facility: CLINIC | Age: 44
End: 2019-03-06

## 2019-03-06 VITALS
HEIGHT: 66 IN | HEART RATE: 77 BPM | TEMPERATURE: 97.2 F | OXYGEN SATURATION: 98 % | WEIGHT: 209 LBS | SYSTOLIC BLOOD PRESSURE: 115 MMHG | BODY MASS INDEX: 33.59 KG/M2 | RESPIRATION RATE: 16 BRPM | DIASTOLIC BLOOD PRESSURE: 56 MMHG

## 2019-03-06 DIAGNOSIS — C50.112 MALIGNANT NEOPLASM OF CENTRAL PORTION OF LEFT FEMALE BREAST, UNSPECIFIED ESTROGEN RECEPTOR STATUS (HCC): Primary | ICD-10-CM

## 2019-03-06 PROCEDURE — 99214 OFFICE O/P EST MOD 30 MIN: CPT | Performed by: INTERNAL MEDICINE

## 2019-03-06 RX ORDER — APREMILAST 30 MG/1
30 TABLET, FILM COATED ORAL 2 TIMES DAILY
COMMUNITY
Start: 2019-02-14 | End: 2022-09-29

## 2019-03-06 NOTE — PROGRESS NOTES
"      PROBLEM LIST:  1. [id number] invasive ductal carcinoma of the left breast, ER positive VT positive HER-2 negative  A) the patient presented with skin dimpling under the left breast. A needle localized biopsy was done on 2/8/2017. Pathology showed a 2.2 cm invasive ductal carcinoma. There were focally positive margins.   Bilateral mastectomy done on 4/12/17.  Pathology showed residual in situ DICIS.  0/1 SLN involved.  [id number] (Stage IIA).  Oncotype score 34 in the high risk group.  Adjuvant chemotherapy with taxotere and cyclophophamide started 5/9/17.  Long delay between cycle 1 and cycle 2 due to mastectomy wound infection.  Severe allergic reaction after cycle 3 with hives, tightness in throat, and swelling of face that lasted for over 2 weeks despite high dose steroids.  CMF given for cycle #4.  B) tamoxifen started October 2017. Enrolled in  of everolimus versus placebo.  Treatment stopped due to grade 4 hypertriglyceridemia.    Subjective     HISTORY OF PRESENT ILLNESS:   Mary Ambrosio returns for follow-up.  She says she is doing well.  She is currently doing physical therapy for her left-sided implant.  She is taking tamoxifen every day and tolerating it well.      Past Medical History, Past Surgical History, Social History, Family History have been reviewed and are without significant changes except as mentioned.    Review of Systems   A comprehensive 14 point review of systems was performed and was negative except as mentioned.    Medications:  The current medication list was reviewed in the EMR    ALLERGIES:    Allergies   Allergen Reactions   • Taxotere [Docetaxel] Hives and Shortness Of Breath   • Tegaderm Ag Mesh [Silver] Rash       Objective      /56 Comment: RUE  Pulse 77   Temp 97.2 °F (36.2 °C) (Temporal)   Resp 16   Ht 167.6 cm (66\")   Wt 94.8 kg (209 lb)   SpO2 98% Comment: RA  BMI 33.73 kg/m²      Performance Status: 0    General: well appearing female in no acute " distress  Neuro: alert and oriented  HEENT: sclera anicteric, oropharynx clear  Lymphatics: no supraclavicular, or axillary adenopathy.   Chest: Status post bilateral mastectomy with implant reconstruction.  No palpable masses or lesions cardiovascular: regular rate and rhythm, no murmurs  Lungs: clear to auscultation bilaterally  Abdomen: soft, nontender, nondistended.  No palpable organomegaly  Extremeties: no lower extremity edema.    Skin: No current rashes or urticaria.  Psych: Mood and affect appropriate        Assessment/Plan   Mary Ambrosio is a 43 y.o. year old female with stage II a ER positive [place] positive HER-2 negative invasive ductal carcinoma of the left breast who returns for follow up on tamoxifen.  She is doing well with no evidence of disease recurrence.  I did double check that tamoxifen does not have any drug interactions with Otezla.    Hot flashes:  Continue Effexor      F/u 4 months.      I spent 15 minutes with the patient. I spent > 50% percent of this time counseling and discussing prognosis, diagnostic testing, evaluation, current status and management.        Nancy Caruso MD  [facility]    10/02/2018          CC:

## 2019-03-14 ENCOUNTER — HOSPITAL ENCOUNTER (OUTPATIENT)
Dept: PHYSICAL THERAPY | Facility: HOSPITAL | Age: 44
Setting detail: THERAPIES SERIES
Discharge: HOME | End: 2019-03-14

## 2019-03-14 DIAGNOSIS — L90.5 SCAR CONDITION AND FIBROSIS OF SKIN: Primary | ICD-10-CM

## 2019-03-14 PROCEDURE — 97140 MANUAL THERAPY 1/> REGIONS: CPT

## 2019-03-14 NOTE — THERAPY PROGRESS REPORT/RE-CERT
Physical Therapy Lymphedema Re-Assessment   [facility]     Patient Name: Mary Ambrosio  : 1975  MRN: [id number]  Today's Date: 3/14/2019      Visit Date: 2019    Visit Dx:    ICD-10-CM ICD-9-CM   1. Scar condition and fibrosis of skin L90.5 709.2       Patient Active Problem List   Diagnosis   • Breast cancer, left (CMS/HCC)        Past Medical History:   Diagnosis Date   • Cancer (CMS/HCC) 2017    breast   • Depression    • Disease of thyroid gland    • Hot flashes    • Migraines    • Seasonal allergies         Past Surgical History:   Procedure Laterality Date   • BREAST BIOPSY Left 2017   • BREAST IMPLANT SURGERY Bilateral 2018    Procedure: BREAST IMPLANT REVISION BILATERAL - EXCISION OF REDUNTANT SKIN;  Surgeon: Kym Burnett MD;  Location:  [facility];  Service: Plastics   • BREAST LUMPECTOMY Left 2018   • BREAST RECONSTRUCTION, BREAST TISSUE EXPANDER INSERTION Bilateral 2017    Procedure: BILATERAL IMMED STAGED BREAST RECONSTRUCTION WITH EXPANDERS AND ALLODERM;  Surgeon: Kym Burnett MD;  Location:  [facility];  Service:    • BREAST RECONSTRUCTION, BREAST TISSUE EXPANDER REMOVAL, IMPLANT INSERTION Bilateral 2017    Procedure: EXCHANGE BILATERAL TISSUE EXPANDERS  TO PERMANENT IMPLANTS;  Surgeon: Kym Burnett MD;  Location:  [facility];  Service:    • CHOLECYSTECTOMY  ~2018   • DILATATION AND CURETTAGE     • HYSTEROSCOPY ENDOMETRIAL ABLATION TUBAL STERILIZATION  2014   • LASER ABLATION     • LIPOMA EXCISION  2014    Right rib cage   • MASTECTOMY WITH SENTINEL NODE BIOPSY AND AXILLARY NODE DISSECTION Bilateral 2017    Procedure: LEFT BREAST MASTECTOMY WITH LEFT SENTINEL NODE BIOPSY AND POSS AXILLARY NODE DISSECTION, RIGHT PROPHYLATIC MASTECTOMY;  Surgeon: Patrice Almonte MD;  Location:  [facility];  Service:    • TONSILLECTOMY     • TUBAL ABDOMINAL LIGATION         Visit Dx:    ICD-10-CM ICD-9-CM   1. Scar condition and  "fibrosis of skin L90.5 709.2           Lymphedema     Row Name 03/14/19 0900             Subjective Pain    Able to rate subjective pain?  yes  -LF      Pre-Treatment Pain Level  0  -LF      Post-Treatment Pain Level  0  -LF         Subjective Comments    Subjective Comments  Reports improvement in tightness and symmetry.  Feels some increase in tightness between visits.  -LF         Manual Lymphatic Drainage    Manual Lymphatic Drainage  initial sequence  -LF      Initial Sequence  supraclavicular;shoulder collectors  -LF      Supraclavicular  right;left  -LF      Shoulder Collectors  right;left  -LF      Astym  other astym  -LF      Other Astym  Supine for ASTYM around left breast beginning medial and superior, work with arm at side and then arm overhead.  Sidelying for ASTYM lateral chest and tricep region, as well as scapular region.  Followed ASTYM with STM same areas.  Soft-tissue stretching along lateral chest, as well as lateral pectorals (including stabilizing with overhead stretch  -LF        User Key  (r) = Recorded By, (t) = Taken By, (c) = Cosigned By    Initials Name Provider Type     Kira Christensen, JULIO Physical Therapist            PT Ortho     Row Name 03/14/19 0900       Posture/Observations    Posture/Observations Comments  supine shoulder table to shoulder distance near equal bilaterally.    -       General ROM    GENERAL ROM COMMENTS  Mild limitation left shoulder flexion compared to right.  She reports \"a little\" tightness end range flex, abd, and HBH.  Left shoulder does not come forward significantly with behind back reach.  -      User Key  (r) = Recorded By, (t) = Taken By, (c) = Cosigned By    Initials Name Provider Type     Kira Christensen PT Physical Therapist          Exercises     Row Name 03/14/19 0900             Subjective Comments    Subjective Comments  Reports improvement in tightness and symmetry.  Feels some increase in tightness between visits.  -LF         " Subjective Pain    Able to rate subjective pain?  yes  -LF      Pre-Treatment Pain Level  0  -LF      Post-Treatment Pain Level  0  -LF         Total Minutes    07939 - PT Therapeutic Exercise Minutes  6  -LF      52013 - PT Manual Therapy Minutes  40  -LF         Exercise 1    Exercise Name 1  sidelying abd, and horz. abd/add. Supine arms overhead with LTR  -LF      Time 1  6  -LF        User Key  (r) = Recorded By, (t) = Taken By, (c) = Cosigned By    Initials Name Provider Type    LF Kira Christensen, PT Physical Therapist             Manual Rx (last 36 hours)      Manual Treatments     Row Name 03/14/19 0900             Total Minutes    82087 - PT Manual Therapy Minutes  40  -LF        User Key  (r) = Recorded By, (t) = Taken By, (c) = Cosigned By    Initials Name Provider Type     Kira Christensen, PT Physical Therapist          PT OP Goals     Row Name 03/14/19 0900          PT Short Term Goals    STG Date to Achieve  02/13/19  -LF     STG 1  Soft tissue swelling, inflammation, or restriction will be reduced by 25 %   -LF     STG 1 Progress  Met  -LF     STG 2  Flexibility will be improved to WFL.  -LF     STG 2 Progress  Progressing;Ongoing  -LF     STG 3  Patient to report 1/5 on the DASH score for placing an object above her head.  -LF     STG 3 Progress  Met  -LF     STG 3 Progress Comments  previously met  -LF        Long Term Goals    LTG 1  Soft tissue swelling, inflammation, or restriction will be reduced by 75 %   -LF     LTG 1 Progress  Progressing;Ongoing  -LF     LTG 2  Patient to be independent with the scar tissue techniques  -LF     LTG 2 Progress  Partially Met  -LF     LTG 3  DASH score improved by 10%  -     LTG 3 Progress  Ongoing  -LF        Time Calculation    PT Goal Re-Cert Due Date  06/12/19  -LF       User Key  (r) = Recorded By, (t) = Taken By, (c) = Cosigned By    Initials Name Provider Type    Kira Muñoz, PT Physical Therapist          PT Assessment/Plan     Row Name  03/14/19 0900          PT Assessment    Functional Limitations  Performance in self-care ADL;Limitation in home management  -LF     Impairments  Impaired flexibility;Pain;Range of motion;Sensation  -LF     Assessment Comments  Irving has responded well to treatment with softening around implant and improved flexibility.  She still has some tightness on left compared to right with end ranges, and notices more so with increased time between visits.  Still overall is doing better.  Will benefit from continued treatment at decreased frequency to progress towards unmet goals.  -LF     Please refer to paper survey for additional self-reported information  Yes  -LF     Rehab Potential  Good  -LF     Patient/caregiver participated in establishment of treatment plan and goals  Yes  -LF        PT Plan    PT Frequency  1x/week;Other (comment) every other week  -LF     Predicted Duration of Therapy Intervention (Therapy Eval)  4 weeks  -LF     Planned CPT's?  PT RE-EVAL: 33733;PT THER PROC EA 15 MIN: 73530;PT MANUAL THERAPY EA 15 MIN: 61590  -LF     PT Plan Comments  cont. with visits every other week  -       User Key  (r) = Recorded By, (t) = Taken By, (c) = Cosigned By    Initials Name Provider Type    LF Kira Christensen, PT Physical Therapist             Outcome Measure Options: Disabilities of the Arm, Shoulder, and Hand (DASH)  DASH  Open a tight or new jar.: No Difficulty  Write: No Difficulty  Turn a key: No Difficulty  Prepare a meal: No Difficulty  Push open a heavy door: Mild Difficulty  Place an object on a shelf above your head: Mild Difficulty  Do heavy household chores (e.g., wash walls, wash floors): Mild Difficulty  Garden or do yard work: Mild Difficulty  Make a bed: No Difficulty  Carry a shopping bag or briefcase: No Difficulty  Carry a heavy object (over 10 lbs): Mild Difficulty  Change a lightbulb overhead: No Difficulty  Wash or blow dry your hair: No Difficulty  Wash your back: Mild Difficulty  Put  on a pullover sweater: No Difficulty  Use a knife to cut food: No Difficulty  Recreational activities in which require little effort (e.g., cardplaying, knitting, etc.): No Difficulty  Recreational activities in which you take some force or impact through your arm, should or hand (e.g. golf, hammering, tennis, etc.): Mild Difficulty  Recreational Activities in which you move your arm freely (e.g., frisbee, badminton, etc.): No Difficulty  Manage transportation needs (getting from one place to another): No Difficulty  Sexual Activities: No Difficulty  During the past week, to what extent has your arm, shoulder, or hand problem interfered with your normal social activites with family, friends, neighbors or groups?: Not at all  During the past week, were you limited in your work or other regular daily activities as a result of your arm, shoulder or hand problem?: Slightly Limited  Arm, Shoulder, or hand pain: Mild  Arm, shoulder or hand pain when you performed any specific activity: Mild  Tingling (pins and needles) in your arm, shoulder, or hand: Mild  Weakness in your arm, shoulder or hand: None  Stiffness in your arm, shoulder or hand: Mild  During the past week, how much difficulty have you had sleeping because of the pain in your arm, shoulder or hand?: Mild Difficulty  I feel less capable, less confident or less useful because of my arm, shoulder or hand problem: Disagree  DASH Sum : 44  Number of Questions Answered: 30  DASH Score: 11.67         Time Calculation:   Start Time: 0900   Therapy Suggested Charges     Code   Minutes Charges    48911 (CPT®)  Pt Neuromusc Re Education Ea 15 Min      55675 (CPT®) Hc Pt Ther Proc Ea 15 Min 6     91857 (CPT®) Hc Gait Training Ea 15 Min      76851 (CPT®) Hc Pt Therapeutic Act Ea 15 Min      56985 (CPT®) Hc Pt Manual Therapy Ea 15 Min 40 3    39962 (CPT®) Hc Pt Ther Massage- Per 15 Min      12300 (CPT®)  Pt Iontophoresis Ea 15 Min      09848 (CPT®)  Pt Elec Stim  Ea-Per 15 Min      97104 (CPT®) Hc Pt Ultrasound Ea 15 Min      09159 (CPT®) Hc Pt Self Care/Mgmt/Train Ea 15 Min      20396 (CPT®) Hc Pt Prosthetic (S) Train Initial Encounter, Each 15 Min      56469 (CPT®) Hc Orthotic(S) Mgmt/Train Initial Encounter, Each 15min      58224 (CPT®) Hc Pt Aquatic Therapy Ea 15 Min      91575 (CPT®) Hc Pt Orthotic(S)/Prosthetic(S) Encounter, Each 15 Min       (CPT®) Hc Pt Electrical Stim Unattended      Total  46 3        Therapy Charges for Today     Code Description Service Date Service Provider Modifiers Qty    18673943598 HC PT MANUAL THERAPY EA 15 MIN 3/14/2019 Kira Christensen, PT GP 3          PT G-Codes  Outcome Measure Options: Disabilities of the Arm, Shoulder, and Hand (DASH)         Kira Christensen, PT  3/14/2019

## 2019-03-28 ENCOUNTER — HOSPITAL ENCOUNTER (OUTPATIENT)
Dept: PHYSICAL THERAPY | Facility: HOSPITAL | Age: 44
Setting detail: THERAPIES SERIES
Discharge: HOME | End: 2019-03-28

## 2019-03-28 DIAGNOSIS — L90.5 SCAR CONDITION AND FIBROSIS OF SKIN: Primary | ICD-10-CM

## 2019-03-28 PROCEDURE — 97110 THERAPEUTIC EXERCISES: CPT

## 2019-03-28 PROCEDURE — 97140 MANUAL THERAPY 1/> REGIONS: CPT

## 2019-03-28 NOTE — THERAPY TREATMENT NOTE
Outpatient Physical Therapy Lymphedema Treatment Note  [facility]     Patient Name: Mary Ambrosio  : 1975  MRN: [id number]  Today's Date: 3/28/2019        Visit Date: 2019    Visit Dx:    ICD-10-CM ICD-9-CM   1. Scar condition and fibrosis of skin L90.5 709.2       Patient Active Problem List   Diagnosis   • Breast cancer, left (CMS/HCC)        Lymphedema     Row Name 19 0900             Subjective Pain    Able to rate subjective pain?  yes  -LF      Pre-Treatment Pain Level  0  -LF      Post-Treatment Pain Level  0  -LF         Skin Changes/Observations    Skin Observations Comment  skin is WNL's  -LF         Manual Lymphatic Drainage    Manual Lymphatic Drainage  initial sequence;opened regional lymph nodes;opened anastamoses  -      Initial Sequence  supraclavicular;shoulder collectors  -LF      Supraclavicular  right;left  -LF      Shoulder Collectors  right;left  -LF      Opened Anastamoses  axillo-inguinal  -LF      Axillo-Inguinal  left  -LF      Astym  other astym  -LF      Other Astym  Supien for ASTYM around right breast, beginning medial and superior including strokes anterior and lateral shoulder.  Then worked inferior and lateral.  Sidelying for ASTYM lateral chest, upper trap and scapular region.  -LF      Manual Therapy  Followed ASTYM with STM and MLD.  Included bending/stretching techniques lateral pectorals and left upper trap.  PROM shoulder flex/scaption with end range stretch  -        User Key  (r) = Recorded By, (t) = Taken By, (c) = Cosigned By    Initials Name Provider Type     Kira Christensen, PT Physical Therapist              PT Assessment/Plan     Row Name 19 0900          PT Assessment    Assessment Comments  Patient still with some pectoral tightness on L.  Tissue around left implant softened  -LF        PT Plan    PT Plan Comments  follow-up in 2 weeks  -       User Key  (r) = Recorded By, (t) = Taken By, (c) = Cosigned By    Initials Name  Provider Type     Kira Christensen PT Physical Therapist             Exercises     Row Name 03/28/19 0900             Subjective Pain    Able to rate subjective pain?  yes  -LF      Pre-Treatment Pain Level  0  -LF      Post-Treatment Pain Level  0  -LF         Total Minutes    84194 - PT Therapeutic Exercise Minutes  8  -LF      40979 - PT Manual Therapy Minutes  35  -LF         Exercise 1    Exercise Name 1  sidelying abd and horz abd/add, supine LTR with arms overhead, HBH open/close  -LF      Time 1  8  -LF        User Key  (r) = Recorded By, (t) = Taken By, (c) = Cosigned By    Initials Name Provider Type     Kira Christensen PT Physical Therapist             Manual Rx (last 36 hours)      Manual Treatments     Row Name 03/28/19 0900             Total Minutes    02480 - PT Manual Therapy Minutes  35  -LF        User Key  (r) = Recorded By, (t) = Taken By, (c) = Cosigned By    Initials Name Provider Type     Kira Christensen PT Physical Therapist            Time Calculation:   Start Time: 0900   Therapy Charges for Today     Code Description Service Date Service Provider Modifiers Qty    [id number]  PT THER PROC EA 15 MIN 3/28/2019 Kira Christensen, PT GP 1    [id number] HC PT MANUAL THERAPY EA 15 MIN 3/28/2019 Kira Christensen, PT GP 2                    Kira Christensen PT  3/28/2019

## 2019-04-20 DIAGNOSIS — C50.112 MALIGNANT NEOPLASM OF CENTRAL PORTION OF LEFT BREAST IN FEMALE, ESTROGEN RECEPTOR POSITIVE (HCC): ICD-10-CM

## 2019-04-20 DIAGNOSIS — Z17.0 MALIGNANT NEOPLASM OF CENTRAL PORTION OF LEFT BREAST IN FEMALE, ESTROGEN RECEPTOR POSITIVE (HCC): ICD-10-CM

## 2019-04-22 DIAGNOSIS — C50.112 MALIGNANT NEOPLASM OF CENTRAL PORTION OF LEFT BREAST IN FEMALE, ESTROGEN RECEPTOR POSITIVE (HCC): ICD-10-CM

## 2019-04-22 DIAGNOSIS — Z17.0 MALIGNANT NEOPLASM OF CENTRAL PORTION OF LEFT BREAST IN FEMALE, ESTROGEN RECEPTOR POSITIVE (HCC): ICD-10-CM

## 2019-04-22 RX ORDER — VENLAFAXINE HYDROCHLORIDE 75 MG/1
CAPSULE, EXTENDED RELEASE ORAL
Qty: 30 CAPSULE | Refills: 5 | OUTPATIENT
Start: 2019-04-22

## 2019-04-22 RX ORDER — VENLAFAXINE HYDROCHLORIDE 75 MG/1
75 CAPSULE, EXTENDED RELEASE ORAL DAILY
Qty: 90 CAPSULE | Refills: 3 | Status: SHIPPED | OUTPATIENT
Start: 2019-04-22 | End: 2020-04-16

## 2019-04-24 ENCOUNTER — DOCUMENTATION (OUTPATIENT)
Dept: PHYSICAL THERAPY | Facility: HOSPITAL | Age: 44
End: 2019-04-24

## 2019-04-24 DIAGNOSIS — L90.5 SCAR CONDITION AND FIBROSIS OF SKIN: Primary | ICD-10-CM

## 2019-04-24 NOTE — THERAPY DISCHARGE NOTE
Outpatient Physical Therapy Discharge Summary         Patient Name: Mary Ambrosio  : 1975  MRN: [id number]    Today's Date: 2019    Visit Dx:    ICD-10-CM ICD-9-CM   1. Scar condition and fibrosis of skin L90.5 709.2       PT OP Goals     Row Name 19 1000          PT Short Term Goals    STG Date to Achieve  19  -LF     STG 1  Soft tissue swelling, inflammation, or restriction will be reduced by 25 %   -LF     STG 1 Progress  Met  -LF     STG 2  Flexibility will be improved to WFL.  -LF     STG 2 Progress  Partially Met  -LF     STG 2 Progress Comments  improved  -LF     STG 3  Patient to report 1/5 on the DASH score for placing an object above her head.  -LF     STG 3 Progress  Met  -LF        Long Term Goals    LTG 1  Soft tissue swelling, inflammation, or restriction will be reduced by 75 %   -LF     LTG 1 Progress  Partially Met  -LF     LTG 2  Patient to be independent with the scar tissue techniques  -LF     LTG 2 Progress  Met  -LF     LTG 3  DASH score improved by 10%  -LF     LTG 3 Progress  Not Met  -LF       User Key  (r) = Recorded By, (t) = Taken By, (c) = Cosigned By    Initials Name Provider Type    Kira Muñoz, JULIO Physical Therapist          OP PT Discharge Summary  Date of Discharge: 19  Reason for Discharge: Patient/Caregiver request  Outcomes Achieved: Patient able to partially acheive established goals  Discharge Destination: Home with home program  Discharge Instructions/Additional Comments: Spoke with patient today to follow-up on missed appointment.  She says she intended to cancel the appointment.  Had met with her new surgeon and she is going to have revision on R side.  Does not need to continue P.T.    Overall symptoms improved with less soft-tissue restriction and improved flexibility.      Kira Christensen, JULIO  2019

## 2019-07-05 ENCOUNTER — APPOINTMENT (OUTPATIENT)
Dept: PREADMISSION TESTING | Facility: HOSPITAL | Age: 44
End: 2019-07-05

## 2019-07-05 VITALS — BODY MASS INDEX: 30.79 KG/M2 | WEIGHT: 191.58 LBS | HEIGHT: 66 IN

## 2019-07-05 LAB
ANION GAP SERPL CALCULATED.3IONS-SCNC: 13 MMOL/L (ref 5–15)
BASOPHILS # BLD AUTO: 0.04 10*3/MM3 (ref 0–0.2)
BASOPHILS NFR BLD AUTO: 0.5 % (ref 0–1.5)
BUN BLD-MCNC: 10 MG/DL (ref 6–20)
BUN/CREAT SERPL: 11.8 (ref 7–25)
CALCIUM SPEC-SCNC: 9.3 MG/DL (ref 8.6–10.5)
CHLORIDE SERPL-SCNC: 104 MMOL/L (ref 98–107)
CO2 SERPL-SCNC: 23 MMOL/L (ref 22–29)
CREAT BLD-MCNC: 0.85 MG/DL (ref 0.57–1)
DEPRECATED RDW RBC AUTO: 39.4 FL (ref 37–54)
EOSINOPHIL # BLD AUTO: 0.38 10*3/MM3 (ref 0–0.4)
EOSINOPHIL NFR BLD AUTO: 5.2 % (ref 0.3–6.2)
ERYTHROCYTE [DISTWIDTH] IN BLOOD BY AUTOMATED COUNT: 12.7 % (ref 12.3–15.4)
GFR SERPL CREATININE-BSD FRML MDRD: 73 ML/MIN/1.73
GLUCOSE BLD-MCNC: 93 MG/DL (ref 65–99)
HCT VFR BLD AUTO: 41.1 % (ref 34–46.6)
HGB BLD-MCNC: 13 G/DL (ref 12–15.9)
IMM GRANULOCYTES # BLD AUTO: 0.02 10*3/MM3 (ref 0–0.05)
IMM GRANULOCYTES NFR BLD AUTO: 0.3 % (ref 0–0.5)
LYMPHOCYTES # BLD AUTO: 2.61 10*3/MM3 (ref 0.7–3.1)
LYMPHOCYTES NFR BLD AUTO: 35.8 % (ref 19.6–45.3)
MCH RBC QN AUTO: 26.9 PG (ref 26.6–33)
MCHC RBC AUTO-ENTMCNC: 31.6 G/DL (ref 31.5–35.7)
MCV RBC AUTO: 85.1 FL (ref 79–97)
MONOCYTES # BLD AUTO: 0.47 10*3/MM3 (ref 0.1–0.9)
MONOCYTES NFR BLD AUTO: 6.4 % (ref 5–12)
NEUTROPHILS # BLD AUTO: 3.78 10*3/MM3 (ref 1.7–7)
NEUTROPHILS NFR BLD AUTO: 51.8 % (ref 42.7–76)
NRBC BLD AUTO-RTO: 0 /100 WBC (ref 0–0.2)
PLATELET # BLD AUTO: 281 10*3/MM3 (ref 140–450)
PMV BLD AUTO: 9.9 FL (ref 6–12)
POTASSIUM BLD-SCNC: 4.2 MMOL/L (ref 3.5–5.2)
RBC # BLD AUTO: 4.83 10*6/MM3 (ref 3.77–5.28)
SODIUM BLD-SCNC: 140 MMOL/L (ref 136–145)
WBC NRBC COR # BLD: 7.3 10*3/MM3 (ref 3.4–10.8)

## 2019-07-05 PROCEDURE — 85025 COMPLETE CBC W/AUTO DIFF WBC: CPT | Performed by: PLASTIC SURGERY

## 2019-07-05 PROCEDURE — 36415 COLL VENOUS BLD VENIPUNCTURE: CPT

## 2019-07-05 PROCEDURE — 80048 BASIC METABOLIC PNL TOTAL CA: CPT | Performed by: PLASTIC SURGERY

## 2019-07-05 RX ORDER — PHENTERMINE HYDROCHLORIDE 37.5 MG/1
30 TABLET ORAL EVERY MORNING
COMMUNITY
End: 2020-03-12

## 2019-07-05 NOTE — PAT
Per Dr Winter, instructed pt to not take phentermine starting today until after surgery.     PErmit for Bilateral breast, but pt indicated only right breast with a remotre possibility of left breast revision. Please have Md confirm with pt before signing permit.

## 2019-07-08 NOTE — PAT
Called Brittnee/Bhavani office to clarify consent because patient thought just have right revision, consent states bilateral.  Per Brittnee consent is correct because Tim is recommending bilateral according to note.Brittnee to call patient.

## 2019-07-11 ENCOUNTER — HOSPITAL ENCOUNTER (OUTPATIENT)
Facility: HOSPITAL | Age: 44
Setting detail: HOSPITAL OUTPATIENT SURGERY
Discharge: HOME | End: 2019-07-11
Attending: PLASTIC SURGERY | Admitting: PLASTIC SURGERY

## 2019-07-11 ENCOUNTER — ANESTHESIA (OUTPATIENT)
Dept: PERIOP | Facility: HOSPITAL | Age: 44
End: 2019-07-11

## 2019-07-11 ENCOUNTER — ANESTHESIA EVENT (OUTPATIENT)
Dept: PERIOP | Facility: HOSPITAL | Age: 44
End: 2019-07-11

## 2019-07-11 VITALS
HEIGHT: 66 IN | OXYGEN SATURATION: 95 % | BODY MASS INDEX: 30.7 KG/M2 | SYSTOLIC BLOOD PRESSURE: 143 MMHG | TEMPERATURE: 98 F | RESPIRATION RATE: 20 BRPM | WEIGHT: 191 LBS | HEART RATE: 84 BPM | DIASTOLIC BLOOD PRESSURE: 89 MMHG

## 2019-07-11 LAB
B-HCG UR QL: NEGATIVE
INTERNAL NEGATIVE CONTROL: NEGATIVE
INTERNAL POSITIVE CONTROL: POSITIVE
Lab: NORMAL

## 2019-07-11 PROCEDURE — 25010000002 PROPOFOL 10 MG/ML EMULSION: Performed by: NURSE ANESTHETIST, CERTIFIED REGISTERED

## 2019-07-11 PROCEDURE — 25010000002 DEXAMETHASONE SODIUM PHOSPHATE 10 MG/ML SOLUTION: Performed by: ANESTHESIOLOGY

## 2019-07-11 PROCEDURE — 25010000002 FENTANYL CITRATE (PF) 100 MCG/2ML SOLUTION: Performed by: NURSE ANESTHETIST, CERTIFIED REGISTERED

## 2019-07-11 PROCEDURE — 25010000002 PHENYLEPHRINE PER 1 ML: Performed by: NURSE ANESTHETIST, CERTIFIED REGISTERED

## 2019-07-11 PROCEDURE — 25010000002 NEOSTIGMINE 10 MG/10ML SOLUTION: Performed by: NURSE ANESTHETIST, CERTIFIED REGISTERED

## 2019-07-11 PROCEDURE — 25010000002 PROPOFOL 1000 MG/ML EMULSION: Performed by: NURSE ANESTHETIST, CERTIFIED REGISTERED

## 2019-07-11 PROCEDURE — 81025 URINE PREGNANCY TEST: CPT | Performed by: ANESTHESIOLOGY

## 2019-07-11 PROCEDURE — 25010000002 GENTAMICIN PER 80 MG: Performed by: PLASTIC SURGERY

## 2019-07-11 PROCEDURE — 25010000002 DIPHENHYDRAMINE PER 50 MG: Performed by: NURSE ANESTHETIST, CERTIFIED REGISTERED

## 2019-07-11 PROCEDURE — 25010000002 BUPRENORPHINE PER 0.1 MG: Performed by: ANESTHESIOLOGY

## 2019-07-11 PROCEDURE — 25010000002 DEXAMETHASONE PER 1 MG: Performed by: NURSE ANESTHETIST, CERTIFIED REGISTERED

## 2019-07-11 PROCEDURE — 25010000003 CEFAZOLIN IN DEXTROSE 2-4 GM/100ML-% SOLUTION: Performed by: PLASTIC SURGERY

## 2019-07-11 PROCEDURE — 25010000002 EPINEPHRINE PER 0.1 MG: Performed by: PLASTIC SURGERY

## 2019-07-11 PROCEDURE — 25010000002 ONDANSETRON PER 1 MG: Performed by: NURSE ANESTHETIST, CERTIFIED REGISTERED

## 2019-07-11 PROCEDURE — 25010000003 CEFAZOLIN PER 500 MG: Performed by: PLASTIC SURGERY

## 2019-07-11 RX ORDER — ESMOLOL HYDROCHLORIDE 10 MG/ML
INJECTION INTRAVENOUS AS NEEDED
Status: DISCONTINUED | OUTPATIENT
Start: 2019-07-11 | End: 2019-07-11 | Stop reason: SURG

## 2019-07-11 RX ORDER — OXYCODONE AND ACETAMINOPHEN 7.5; 325 MG/1; MG/1
2 TABLET ORAL EVERY 6 HOURS PRN
Qty: 40 TABLET | Refills: 0 | Status: SHIPPED | OUTPATIENT
Start: 2019-07-11 | End: 2019-09-11

## 2019-07-11 RX ORDER — CEFAZOLIN SODIUM 2 G/100ML
2 INJECTION, SOLUTION INTRAVENOUS ONCE
Status: COMPLETED | OUTPATIENT
Start: 2019-07-11 | End: 2019-07-11

## 2019-07-11 RX ORDER — PROMETHAZINE HYDROCHLORIDE 25 MG/1
25 SUPPOSITORY RECTAL ONCE AS NEEDED
Status: DISCONTINUED | OUTPATIENT
Start: 2019-07-11 | End: 2019-07-11 | Stop reason: HOSPADM

## 2019-07-11 RX ORDER — DIPHENHYDRAMINE HYDROCHLORIDE 50 MG/ML
INJECTION INTRAMUSCULAR; INTRAVENOUS AS NEEDED
Status: DISCONTINUED | OUTPATIENT
Start: 2019-07-11 | End: 2019-07-11 | Stop reason: SURG

## 2019-07-11 RX ORDER — PROPOFOL 10 MG/ML
VIAL (ML) INTRAVENOUS AS NEEDED
Status: DISCONTINUED | OUTPATIENT
Start: 2019-07-11 | End: 2019-07-11 | Stop reason: SURG

## 2019-07-11 RX ORDER — NEOSTIGMINE METHYLSULFATE 1 MG/ML
INJECTION, SOLUTION INTRAVENOUS AS NEEDED
Status: DISCONTINUED | OUTPATIENT
Start: 2019-07-11 | End: 2019-07-11 | Stop reason: SURG

## 2019-07-11 RX ORDER — ONDANSETRON 2 MG/ML
INJECTION INTRAMUSCULAR; INTRAVENOUS AS NEEDED
Status: DISCONTINUED | OUTPATIENT
Start: 2019-07-11 | End: 2019-07-11 | Stop reason: SURG

## 2019-07-11 RX ORDER — PROMETHAZINE HYDROCHLORIDE 25 MG/1
25 TABLET ORAL ONCE AS NEEDED
Status: DISCONTINUED | OUTPATIENT
Start: 2019-07-11 | End: 2019-07-11 | Stop reason: HOSPADM

## 2019-07-11 RX ORDER — LABETALOL HYDROCHLORIDE 5 MG/ML
5 INJECTION, SOLUTION INTRAVENOUS
Status: DISCONTINUED | OUTPATIENT
Start: 2019-07-11 | End: 2019-07-11 | Stop reason: HOSPADM

## 2019-07-11 RX ORDER — BUPIVACAINE HYDROCHLORIDE 2.5 MG/ML
INJECTION, SOLUTION EPIDURAL; INFILTRATION; INTRACAUDAL
Status: COMPLETED | OUTPATIENT
Start: 2019-07-11 | End: 2019-07-11

## 2019-07-11 RX ORDER — MEPERIDINE HYDROCHLORIDE 25 MG/ML
12.5 INJECTION INTRAMUSCULAR; INTRAVENOUS; SUBCUTANEOUS
Status: DISCONTINUED | OUTPATIENT
Start: 2019-07-11 | End: 2019-07-11 | Stop reason: HOSPADM

## 2019-07-11 RX ORDER — ONDANSETRON 2 MG/ML
4 INJECTION INTRAMUSCULAR; INTRAVENOUS ONCE AS NEEDED
Status: DISCONTINUED | OUTPATIENT
Start: 2019-07-11 | End: 2019-07-11 | Stop reason: HOSPADM

## 2019-07-11 RX ORDER — LIDOCAINE HYDROCHLORIDE 10 MG/ML
0.5 INJECTION, SOLUTION EPIDURAL; INFILTRATION; INTRACAUDAL; PERINEURAL ONCE AS NEEDED
Status: COMPLETED | OUTPATIENT
Start: 2019-07-11 | End: 2019-07-11

## 2019-07-11 RX ORDER — SODIUM CHLORIDE, SODIUM LACTATE, POTASSIUM CHLORIDE, CALCIUM CHLORIDE 600; 310; 30; 20 MG/100ML; MG/100ML; MG/100ML; MG/100ML
INJECTION, SOLUTION INTRAVENOUS CONTINUOUS PRN
Status: DISCONTINUED | OUTPATIENT
Start: 2019-07-11 | End: 2019-07-11 | Stop reason: SURG

## 2019-07-11 RX ORDER — LIDOCAINE HYDROCHLORIDE 20 MG/ML
INJECTION, SOLUTION INFILTRATION; PERINEURAL AS NEEDED
Status: DISCONTINUED | OUTPATIENT
Start: 2019-07-11 | End: 2019-07-11 | Stop reason: SURG

## 2019-07-11 RX ORDER — FENTANYL CITRATE 50 UG/ML
50 INJECTION, SOLUTION INTRAMUSCULAR; INTRAVENOUS
Status: DISCONTINUED | OUTPATIENT
Start: 2019-07-11 | End: 2019-07-11 | Stop reason: HOSPADM

## 2019-07-11 RX ORDER — SODIUM CHLORIDE, SODIUM LACTATE, POTASSIUM CHLORIDE, AND CALCIUM CHLORIDE .6; .31; .03; .02 G/100ML; G/100ML; G/100ML; G/100ML
IRRIGANT IRRIGATION AS NEEDED
Status: DISCONTINUED | OUTPATIENT
Start: 2019-07-11 | End: 2019-07-11 | Stop reason: HOSPADM

## 2019-07-11 RX ORDER — PROMETHAZINE HYDROCHLORIDE 25 MG/ML
6.25 INJECTION, SOLUTION INTRAMUSCULAR; INTRAVENOUS ONCE AS NEEDED
Status: DISCONTINUED | OUTPATIENT
Start: 2019-07-11 | End: 2019-07-11 | Stop reason: HOSPADM

## 2019-07-11 RX ORDER — DEXAMETHASONE SODIUM PHOSPHATE 10 MG/ML
INJECTION INTRAMUSCULAR; INTRAVENOUS AS NEEDED
Status: DISCONTINUED | OUTPATIENT
Start: 2019-07-11 | End: 2019-07-11 | Stop reason: SURG

## 2019-07-11 RX ORDER — FAMOTIDINE 10 MG/ML
20 INJECTION, SOLUTION INTRAVENOUS ONCE
Status: CANCELLED | OUTPATIENT
Start: 2019-07-11 | End: 2019-07-11

## 2019-07-11 RX ORDER — ROCURONIUM BROMIDE 10 MG/ML
INJECTION, SOLUTION INTRAVENOUS AS NEEDED
Status: DISCONTINUED | OUTPATIENT
Start: 2019-07-11 | End: 2019-07-11 | Stop reason: SURG

## 2019-07-11 RX ORDER — IPRATROPIUM BROMIDE AND ALBUTEROL SULFATE 2.5; .5 MG/3ML; MG/3ML
3 SOLUTION RESPIRATORY (INHALATION) ONCE AS NEEDED
Status: DISCONTINUED | OUTPATIENT
Start: 2019-07-11 | End: 2019-07-11 | Stop reason: HOSPADM

## 2019-07-11 RX ORDER — FAMOTIDINE 20 MG/1
20 TABLET, FILM COATED ORAL ONCE
Status: COMPLETED | OUTPATIENT
Start: 2019-07-11 | End: 2019-07-11

## 2019-07-11 RX ORDER — SODIUM CHLORIDE 0.9 % (FLUSH) 0.9 %
3 SYRINGE (ML) INJECTION EVERY 12 HOURS SCHEDULED
Status: DISCONTINUED | OUTPATIENT
Start: 2019-07-11 | End: 2019-07-11 | Stop reason: HOSPADM

## 2019-07-11 RX ORDER — DEXAMETHASONE SODIUM PHOSPHATE 10 MG/ML
INJECTION, SOLUTION INTRAMUSCULAR; INTRAVENOUS
Status: COMPLETED | OUTPATIENT
Start: 2019-07-11 | End: 2019-07-11

## 2019-07-11 RX ORDER — GLYCOPYRROLATE 0.2 MG/ML
INJECTION INTRAMUSCULAR; INTRAVENOUS AS NEEDED
Status: DISCONTINUED | OUTPATIENT
Start: 2019-07-11 | End: 2019-07-11 | Stop reason: SURG

## 2019-07-11 RX ORDER — NALOXONE HCL 0.4 MG/ML
0.4 VIAL (ML) INJECTION AS NEEDED
Status: DISCONTINUED | OUTPATIENT
Start: 2019-07-11 | End: 2019-07-11 | Stop reason: HOSPADM

## 2019-07-11 RX ORDER — HYDROMORPHONE HYDROCHLORIDE 1 MG/ML
0.5 INJECTION, SOLUTION INTRAMUSCULAR; INTRAVENOUS; SUBCUTANEOUS
Status: DISCONTINUED | OUTPATIENT
Start: 2019-07-11 | End: 2019-07-11 | Stop reason: HOSPADM

## 2019-07-11 RX ORDER — MAGNESIUM HYDROXIDE 1200 MG/15ML
LIQUID ORAL AS NEEDED
Status: DISCONTINUED | OUTPATIENT
Start: 2019-07-11 | End: 2019-07-11 | Stop reason: HOSPADM

## 2019-07-11 RX ORDER — LIDOCAINE HYDROCHLORIDE AND EPINEPHRINE 10; 10 MG/ML; UG/ML
INJECTION, SOLUTION INFILTRATION; PERINEURAL AS NEEDED
Status: DISCONTINUED | OUTPATIENT
Start: 2019-07-11 | End: 2019-07-11 | Stop reason: HOSPADM

## 2019-07-11 RX ORDER — SODIUM CHLORIDE 0.9 % (FLUSH) 0.9 %
1-10 SYRINGE (ML) INJECTION AS NEEDED
Status: DISCONTINUED | OUTPATIENT
Start: 2019-07-11 | End: 2019-07-11 | Stop reason: HOSPADM

## 2019-07-11 RX ORDER — FENTANYL CITRATE 50 UG/ML
INJECTION, SOLUTION INTRAMUSCULAR; INTRAVENOUS AS NEEDED
Status: DISCONTINUED | OUTPATIENT
Start: 2019-07-11 | End: 2019-07-11 | Stop reason: SURG

## 2019-07-11 RX ORDER — CEPHALEXIN 500 MG/1
500 CAPSULE ORAL 4 TIMES DAILY
Qty: 28 CAPSULE | Refills: 0 | Status: SHIPPED | OUTPATIENT
Start: 2019-07-11 | End: 2019-07-18

## 2019-07-11 RX ORDER — HYDRALAZINE HYDROCHLORIDE 20 MG/ML
5 INJECTION INTRAMUSCULAR; INTRAVENOUS
Status: DISCONTINUED | OUTPATIENT
Start: 2019-07-11 | End: 2019-07-11 | Stop reason: HOSPADM

## 2019-07-11 RX ORDER — SODIUM CHLORIDE, SODIUM LACTATE, POTASSIUM CHLORIDE, CALCIUM CHLORIDE 600; 310; 30; 20 MG/100ML; MG/100ML; MG/100ML; MG/100ML
9 INJECTION, SOLUTION INTRAVENOUS CONTINUOUS
Status: DISCONTINUED | OUTPATIENT
Start: 2019-07-11 | End: 2019-07-11 | Stop reason: HOSPADM

## 2019-07-11 RX ORDER — SODIUM CHLORIDE 0.9 % (FLUSH) 0.9 %
3-10 SYRINGE (ML) INJECTION AS NEEDED
Status: DISCONTINUED | OUTPATIENT
Start: 2019-07-11 | End: 2019-07-11 | Stop reason: HOSPADM

## 2019-07-11 RX ORDER — HYDROCODONE BITARTRATE AND ACETAMINOPHEN 5; 325 MG/1; MG/1
1 TABLET ORAL ONCE AS NEEDED
Status: DISCONTINUED | OUTPATIENT
Start: 2019-07-11 | End: 2019-07-11 | Stop reason: HOSPADM

## 2019-07-11 RX ORDER — BUPRENORPHINE HYDROCHLORIDE 0.32 MG/ML
INJECTION INTRAMUSCULAR; INTRAVENOUS
Status: COMPLETED | OUTPATIENT
Start: 2019-07-11 | End: 2019-07-11

## 2019-07-11 RX ADMIN — ROCURONIUM BROMIDE 50 MG: 10 INJECTION INTRAVENOUS at 10:19

## 2019-07-11 RX ADMIN — SODIUM CHLORIDE, POTASSIUM CHLORIDE, SODIUM LACTATE AND CALCIUM CHLORIDE 9 ML/HR: 600; 310; 30; 20 INJECTION, SOLUTION INTRAVENOUS at 10:12

## 2019-07-11 RX ADMIN — FAMOTIDINE 20 MG: 20 TABLET ORAL at 09:53

## 2019-07-11 RX ADMIN — ONDANSETRON 4 MG: 2 INJECTION INTRAMUSCULAR; INTRAVENOUS at 11:50

## 2019-07-11 RX ADMIN — PROPOFOL 25 MCG/KG/MIN: 10 INJECTION, EMULSION INTRAVENOUS at 10:30

## 2019-07-11 RX ADMIN — SODIUM CHLORIDE, POTASSIUM CHLORIDE, SODIUM LACTATE AND CALCIUM CHLORIDE: 600; 310; 30; 20 INJECTION, SOLUTION INTRAVENOUS at 11:49

## 2019-07-11 RX ADMIN — GLYCOPYRROLATE 0.4 MG: 0.2 INJECTION, SOLUTION INTRAMUSCULAR; INTRAVENOUS at 11:56

## 2019-07-11 RX ADMIN — DIPHENHYDRAMINE HYDROCHLORIDE 12.5 MG: 50 INJECTION INTRAMUSCULAR; INTRAVENOUS at 10:29

## 2019-07-11 RX ADMIN — FENTANYL CITRATE 50 MCG: 50 INJECTION, SOLUTION INTRAMUSCULAR; INTRAVENOUS at 10:50

## 2019-07-11 RX ADMIN — GLYCOPYRROLATE 0.2 MG: 0.2 INJECTION, SOLUTION INTRAMUSCULAR; INTRAVENOUS at 10:58

## 2019-07-11 RX ADMIN — PHENYLEPHRINE HYDROCHLORIDE 50 MCG: 10 INJECTION INTRAVENOUS at 10:56

## 2019-07-11 RX ADMIN — PROPOFOL 200 MG: 10 INJECTION, EMULSION INTRAVENOUS at 10:19

## 2019-07-11 RX ADMIN — SODIUM CHLORIDE, POTASSIUM CHLORIDE, SODIUM LACTATE AND CALCIUM CHLORIDE: 600; 310; 30; 20 INJECTION, SOLUTION INTRAVENOUS at 10:16

## 2019-07-11 RX ADMIN — PHENYLEPHRINE HYDROCHLORIDE 50 MCG: 10 INJECTION INTRAVENOUS at 11:04

## 2019-07-11 RX ADMIN — NEOSTIGMINE METHYLSULFATE 4 MG: 1 INJECTION, SOLUTION INTRAVENOUS at 11:56

## 2019-07-11 RX ADMIN — HYDROCODONE BITARTRATE AND ACETAMINOPHEN 1 TABLET: 5; 325 TABLET ORAL at 13:10

## 2019-07-11 RX ADMIN — FENTANYL CITRATE 50 MCG: 50 INJECTION, SOLUTION INTRAMUSCULAR; INTRAVENOUS at 11:36

## 2019-07-11 RX ADMIN — CEFAZOLIN SODIUM 2 G: 2 INJECTION, SOLUTION INTRAVENOUS at 10:25

## 2019-07-11 RX ADMIN — LIDOCAINE HYDROCHLORIDE 0.3 ML: 10 INJECTION, SOLUTION EPIDURAL; INFILTRATION; INTRACAUDAL; PERINEURAL at 09:54

## 2019-07-11 RX ADMIN — BUPRENORPHINE HYDROCHLORIDE 0.3 MG: 0.32 INJECTION INTRAMUSCULAR; INTRAVENOUS at 10:24

## 2019-07-11 RX ADMIN — DEXAMETHASONE SODIUM PHOSPHATE 4 MG: 10 INJECTION INTRAMUSCULAR; INTRAVENOUS at 10:24

## 2019-07-11 RX ADMIN — ESMOLOL HYDROCHLORIDE 30 MG: 10 INJECTION INTRAVENOUS at 10:19

## 2019-07-11 RX ADMIN — LIDOCAINE HYDROCHLORIDE 50 MG: 20 INJECTION, SOLUTION INFILTRATION; PERINEURAL at 10:19

## 2019-07-11 RX ADMIN — BUPIVACAINE HYDROCHLORIDE 60 ML: 2.5 INJECTION, SOLUTION EPIDURAL; INFILTRATION; INTRACAUDAL; PERINEURAL at 10:24

## 2019-07-11 RX ADMIN — ESMOLOL HYDROCHLORIDE 40 MG: 10 INJECTION INTRAVENOUS at 10:38

## 2019-07-11 RX ADMIN — DEXAMETHASONE SODIUM PHOSPHATE 6 MG: 10 INJECTION INTRAMUSCULAR; INTRAVENOUS at 10:30

## 2019-07-11 NOTE — H&P
[facility] Pre-op    Full history and physical note from office is up to date.  See office note scanned dated 6/19/19, unable to attach to note.    Afebrile HR 67 O2 97% /56    LAB Results:  Lab Results   Component Value Date    WBC 7.30 07/05/2019    HGB 13.0 07/05/2019    HCT 41.1 07/05/2019    MCV 85.1 07/05/2019     07/05/2019    NEUTROABS 3.78 07/05/2019    GLUCOSE 93 07/05/2019    BUN 10 07/05/2019    CREATININE 0.85 07/05/2019    EGFRIFNONA 73 07/05/2019     07/05/2019    K 4.2 07/05/2019     07/05/2019    CO2 23.0 07/05/2019    MG 2.0 08/02/2017    CALCIUM 9.3 07/05/2019    ALBUMIN 4.13 10/02/2018    AST 29 10/02/2018    ALT 37 10/02/2018    BILITOT 0.2 (L) 10/02/2018       Lashawn Almazan, APRN 7/11/2019 9:44 AM

## 2019-07-11 NOTE — BRIEF OP NOTE
FAT GRAFTING, WOUND TRUNK REPAIR / CLOSURE  Progress Note    Mary Ambrosio  7/11/2019    Pre-op Diagnosis:   * Personal history of breast cancer [Z85.3]     * Acquired absence of breast and absent nipple, bilateral [Z90.13]     * Breast asymmetry [N64.89]       Post-Op Diagnosis Codes:     * Personal history of breast cancer [Z85.3]     * Acquired absence of breast and absent nipple, bilateral [Z90.13]     * Breast asymmetry [N64.89]    Procedure/CPT® Codes:  TX REVISE BREAST RECONSTRUCTION [42990]    Procedure(s):  RECONSTRUCTED BREAST REVISION WITH FAT GRAFTING  COMPLEX CLOSURE OF BILATERAL BREAST, RIGHT BREAST IMPLANT EXCHANGE    Surgeon(s):  Rogelio Dumont MD    Anesthesia: General    Staff:   Circulator: Lamonte Leavitt RN; Eulalia Dennison RN; Heaven Aguirre RN  Scrub Person: Hailey Austin Randall David, RN  Vendor Representative: Jamaal Wen    Estimated Blood Loss: none    Urine Voided: * No values recorded between 7/11/2019 10:16 AM and 7/11/2019 12:20 PM *    Specimens:                None          Drains:      Findings: malrotated right implant    Complications: none immediate      Rogelio Dumont MD     Date: 7/11/2019  Time: 12:20 PM

## 2019-07-11 NOTE — ANESTHESIA PROCEDURE NOTES
Airway  Urgency: elective    Airway not difficult    General Information and Staff    Patient location during procedure: OR  CRNA: Ludwin Cho CRNA    Indications and Patient Condition  Indications for airway management: airway protection    Preoxygenated: yes  MILS not maintained throughout  Mask difficulty assessment: 1 - vent by mask    Final Airway Details  Final airway type: endotracheal airway      Successful airway: ETT  Cuffed: yes   Successful intubation technique: direct laryngoscopy  Facilitating devices/methods: Bougie  Endotracheal tube insertion site: oral  Blade: Goodman  Blade size: 2  ETT size (mm): 7.0  Cormack-Lehane Classification: grade IIa - partial view of glottis  Placement verified by: chest auscultation and capnometry   Measured from: lips  ETT to lips (cm): 21  Number of attempts at approach: 1    Additional Comments  Negative epigastric sounds, Breath sound equal bilaterally with symmetric chest rise and fall.  Atraumatic, dentition unchanged

## 2019-07-11 NOTE — ANESTHESIA POSTPROCEDURE EVALUATION
Patient: Mary Ambrosio    Procedure Summary     Date:  07/11/19 Room / Location:   [facility] /  [facility]    Anesthesia Start:  1016 Anesthesia Stop:      Procedures:       RECONSTRUCTED BREAST REVISION WITH FAT GRAFTING (Bilateral )      COMPLEX CLOSURE OF BILATERAL BREAST, RIGHT BREAST IMPLANT EXCHANGE (Bilateral ) Diagnosis:       Personal history of breast cancer      Acquired absence of breast and absent nipple, bilateral      Breast asymmetry    Surgeon:  Rogelio Dumont MD Provider:  Dean Velez MD    Anesthesia Type:  general ASA Status:  2          Anesthesia Type: general  Last vitals  BP   143/96   Temp   97.1   Pulse   96   Resp   16     SpO2   99%     Post Anesthesia Care and Evaluation    Patient location during evaluation: PACU  Patient participation: waiting for patient participation  Level of consciousness: responsive to light touch and awake  Pain score: 0  Pain management: adequate  Airway patency: patent  Anesthetic complications: No anesthetic complications  PONV Status: none  Cardiovascular status: hemodynamically stable and acceptable  Respiratory status: nonlabored ventilation, acceptable, nasal cannula and oral airway  Hydration status: acceptable

## 2019-07-11 NOTE — ANESTHESIA PROCEDURE NOTES
Peripheral Block      Patient reassessed immediately prior to procedure    Patient location during procedure: OR  Reason for block: at surgeon's request and post-op pain management  Performed by  Anesthesiologist: Dean Velez MD  Preanesthetic Checklist  Completed: patient identified, site marked, surgical consent, pre-op evaluation, timeout performed, IV checked, risks and benefits discussed and monitors and equipment checked  Prep:  Pt Position: supine  Sterile barriers:cap, gloves, sterile barriers and mask  Prep: ChloraPrep  Patient monitoring: blood pressure monitoring, continuous pulse oximetry and EKG  Procedure  Sedation:yes  Performed under: general  Guidance:ultrasound guided  Images:still images obtained    Laterality:Bilateral  Block Type:TAP  Injection Technique:single-shot  Needle Type:short-bevel and echogenic  Needle Gauge:20 G      Medications Used: buprenorphine (BUPRENEX) injection, 0.3 mg  dexamethasone sodium phosphate injection, 4 mg  bupivacaine PF (MARCAINE) 0.25 % injection, 60 mL  Med admintered at 7/11/2019 10:24 AM      Medications  Comment:Block Injection:  LA dose divided between Right and Left block       Adjuncts:  Decadron 4mg PSF, Buprenex 0.3mg (Per total volume of LA)    Post Assessment  Injection Assessment: negative aspiration for heme, incremental injection and no paresthesia on injection  Patient Tolerance:comfortable throughout block  Complications:no  Additional Notes      Under Ultrasound guidance, a BBraun 4inch 360 degree needle was advanced with Normal Saline hydro dissection of tissue.  The Internal Oblique and Transversus Abdominus muscles where visualized.  At or before the aponeurosis of Internal Oblique, local anesthetic spread was visualized in the Transversus Abdominus Plane. Injection was made incrementally with aspiration every 5 mls.  There was no  intravascular injection,  injection pressure was normal, there was no neural injection, and the procedure  was completed without difficulty.  Thank You.  4 quadrant TAPs

## 2019-07-11 NOTE — OP NOTE
Preoperative diagnosis: 1.  Personal history of breast cancer  2.  Bilateral absent breast nipple areolar complex  3.  Breast asymmetry    Postoperative diagnosis: 1.  Personal history of breast cancer  2.  Bilateral absent breast and nipple areolar complex  3.  Breast asymmetry    Surgeon: Rogelio Dumont MD    Assistant: None    Anesthesia: General    Procedure: 1.  Bilateral revision of her reconstructed breast with placement of fat graft.  150 cc of fat was placed in the left reconstructed breast and 160 cc of fat was placed on the right.  Also, right reconstructed breast pocket work was undertaken with removal of the implant, pop [place] of her lateral and inferior lateral gutter and replacement of her implant.    Indication: The patient is a 43-year-old white female status post bilateral implant-based breast reconstruction.  She presents my office complaining of breast asymmetry.  She was noted to have volume deficiencies and asymmetries in her bilateral medial, superior medial, superior, superior lateral, and lateral poles of her reconstructed breast as well as a lower right reconstructed breast footprint.  I recommended removal of her implant, pocket work, replacement of her implant, and placement of fat graft in the aforementioned areas.  All techniques potential complications and typical postoperative course were discussed with the patient.  She indicated her understanding wished to proceed.    Findings: 1.  Bilateral absent breast nipple areolar complex  2.  Lower right reconstructed breast footprint  3.  Volume asymmetries and deficiencies as noted above    Description: The patient was taken from preoperative holding to the operating room after informed consent was signed and on the chart and placed under general anesthesia successfully.  Prior to induction of anesthesia, the patient received a prophylactic dose of antibiotics and had bilateral lower stomach sequential compression devices in place and  operational.  She was placed supine on the operating room table.  She did pill place meet her knees.  Her arms are gently abducted to 90 degrees and she had ulnar nerve padding.  Her chest wall, abdomen, hips, and thighs were prepped and draped in the usual sterile fashion.  After properly identifying the patient the patient's problem, her left mastectomy incision was marked for excision.  It was injected with 1/2% lidocaine with 1-200,000 epinephrine.  It was subsequently incised with a 10 blade.  The subcutaneous tissue was dissected down the level of the capsule.  The capsule was elevated off the underlying implant and penetrated with electrocautery.  This was then extended medially laterally while protecting the implant with a hemostat.  The implant was subsequently removed.  It was placed into an antibiotic and Betadine solution and covered with a towel.  The lateral and inferior lateral gutter underwent pop [place] as did the inferior gutter.  In 800 mL sizer was placed into the breast pocket via a Simons funnel.  The pocket was temporary closed with Vicryl suture and the skin was temporary closed with staples.  The patient was sat upright.  An assessment of the amount of elevation was made and felt to be complete.  Next, bilateral lower quadrant and hip stab incisions were made with a 15 blade.  Tumescent solution consisting of 1 L of LR and 1 ampoule of epinephrine were injected into her abdomen and hips.  A 4 mm accelerator cannula was then used to harvest fat into a sterile Revolve system.  The donor sites underwent fat equalization.  The fat was washed 3 times with warm lactated Ringer solution.  It was transferred into 20 cc syringes.  Bilateral medial and lateral inframammary crease stab incisions were made with a 15 blade.  The fat was then injected into her aforementioned areas utilizing a 2 mm Santamaria cannula.  150 cc of fat was injected on the left reconstructed breast and 160 cc was injected on the  right.  This was done in the aforementioned areas based on need and asymmetry.  All fat grafting and liposuction access sites were closed with 5-0 fast-absorbing plain gut suture.    Next, the temporary closure and sizer were taken down to remove respectively on the right side.  The pocket was irrigated with an antibiotic and Betadine solution.  The capsule was tagged with 3-0 Vicryl pop-off sutures.  My gloves were changed.  The planned implant was inserted in the breast pocket via a Simons funnel.  It was oriented and seated properly.  The Vicryl ties were then tied down.  The skin was then closed with 3-0 Monocryl suture in a deep dermal buried interrupted fashion and 4-0 Monocryl suture in an intracuticular fashion.  2 x 2 covered arms were applied to all liposuction access sites.  The patient's chest wall was dressed with Kerlix fluffs and a surgical bra.  The abdomen was dressed with an abdominal binder.  The case is turned over to anesthesia which point the patient was awoken from general anesthesia successfully and taken to PACU in stable condition.  Was present for the entire procedure.  All counts were correct.    Estimated blood loss: Minimal    Drains: None    Complications: None immediate

## 2019-09-11 ENCOUNTER — OFFICE VISIT (OUTPATIENT)
Dept: ONCOLOGY | Facility: CLINIC | Age: 44
End: 2019-09-11

## 2019-09-11 VITALS
DIASTOLIC BLOOD PRESSURE: 54 MMHG | TEMPERATURE: 97.4 F | RESPIRATION RATE: 18 BRPM | HEART RATE: 69 BPM | BODY MASS INDEX: 30.37 KG/M2 | WEIGHT: 189 LBS | OXYGEN SATURATION: 99 % | HEIGHT: 66 IN | SYSTOLIC BLOOD PRESSURE: 107 MMHG

## 2019-09-11 DIAGNOSIS — C50.112 MALIGNANT NEOPLASM OF CENTRAL PORTION OF LEFT FEMALE BREAST, UNSPECIFIED ESTROGEN RECEPTOR STATUS (HCC): Primary | ICD-10-CM

## 2019-09-11 PROCEDURE — 99213 OFFICE O/P EST LOW 20 MIN: CPT | Performed by: INTERNAL MEDICINE

## 2019-09-11 NOTE — PROGRESS NOTES
"      PROBLEM LIST:  1. [id number] invasive ductal carcinoma of the left breast, ER positive [place] positive HER-2 negative  A) the patient presented with skin dimpling under the left breast. A needle localized biopsy was done on 2/8/2017. Pathology showed a 2.2 cm invasive ductal carcinoma. There were focally positive margins.   Bilateral mastectomy done on 4/12/17.  Pathology showed residual in situ DICIS.  0/1 SLN involved.  [id number] (Stage IIA).  Oncotype score 34 in the high risk group.  Adjuvant chemotherapy with taxotere and cyclophophamide started 5/9/17.  Long delay between cycle 1 and cycle 2 due to mastectomy wound infection.  Severe allergic reaction after cycle 3 with hives, tightness in throat, and swelling of face that lasted for over 2 weeks despite high dose steroids.  CMF given for cycle #4.  B) tamoxifen started October 2017. Enrolled in  of everolimus versus placebo.  Treatment stopped due to grade 4 hypertriglyceridemia.    Subjective     HISTORY OF PRESENT ILLNESS:   Mary Ambrosio returns for follow-up.  She continues to take tamoxifen.  She still has quite a lot of hot flashes.  She is taking the Effexor.  Otherwise she is feeling well.      Past Medical History, Past Surgical History, Social History, Family History have been reviewed and are without significant changes except as mentioned.    Review of Systems   A comprehensive 14 point review of systems was performed and was negative except as mentioned.    Medications:  The current medication list was reviewed in the EMR    ALLERGIES:    Allergies   Allergen Reactions   • Taxotere [Docetaxel] Hives and Shortness Of Breath   • Tegaderm Ag Mesh [Silver] Rash     Prolonged exposure        Objective      /54 Comment: RUE  Pulse 69   Temp 97.4 °F (36.3 °C) (Temporal)   Resp 18   Ht 167.6 cm (66\")   Wt 85.7 kg (189 lb)   SpO2 99% Comment: RA  BMI 30.51 kg/m²      Performance Status: 0    General: well appearing female in no acute " distress  Neuro: alert and oriented  HEENT: sclera anicteric, oropharynx clear  Lymphatics: no supraclavicular, or axillary adenopathy.   cardiovascular: regular rate and rhythm, no murmurs  Lungs: clear to auscultation bilaterally  Abdomen: soft, nontender, nondistended.  No palpable organomegaly  Extremeties: no lower extremity edema.    Skin: No current rashes or urticaria.  Psych: Mood and affect appropriate        Assessment/Plan   Mary Ambrosio is a 44 y.o. year old female with stage II a ER positive [place] positive HER-2 negative invasive ductal carcinoma of the left breast who returns for follow up on tamoxifen.  She is doing well with no evidence of disease recurrence.     Hot flashes:  Continue Effexor.  We discussed that there or other treatments that we could consider, but at this point she is not interested in taking any more pills.    We will space out her visits to every 6 months.      I spent 15 minutes with the patient. I spent > 50% percent of this time counseling and discussing prognosis, diagnostic testing, evaluation, current status and management.        Nancy Caruso MD  [facility]    10/02/2018          CC:

## 2019-11-14 RX ORDER — TAMOXIFEN CITRATE 20 MG/1
TABLET ORAL
Qty: 30 TABLET | Refills: 11 | Status: SHIPPED | OUTPATIENT
Start: 2019-11-14 | End: 2020-11-30

## 2019-12-27 ENCOUNTER — HOSPITAL ENCOUNTER (OUTPATIENT)
Age: 44
End: 2019-12-27
Payer: COMMERCIAL

## 2019-12-27 DIAGNOSIS — R00.2: ICD-10-CM

## 2019-12-27 DIAGNOSIS — R42: Primary | ICD-10-CM

## 2019-12-27 LAB
ALBUMIN LEVEL: 3.4 GM/DL (ref 3.4–5)
ALBUMIN/GLOB SERPL: 1 {RATIO} (ref 1.1–1.8)
ALP ISO SERPL-ACNC: 81 U/L (ref 46–116)
ALT SERPLBLD-CCNC: 25 U/L (ref 12–78)
ANION GAP SERPL CALC-SCNC: 14.9 MEQ/L (ref 5–15)
AST SERPL QL: 18 U/L (ref 15–37)
BILIRUBIN,TOTAL: 0.1 MG/DL (ref 0.2–1)
BUN SERPL-MCNC: 11 MG/DL (ref 7–18)
CALCIUM SPEC-MCNC: 8.4 MG/DL (ref 8.5–10.1)
CHLORIDE SPEC-SCNC: 106 MMOL/L (ref 98–107)
CO2 SERPL-SCNC: 26 MMOL/L (ref 21–32)
CREAT BLD-SCNC: 0.89 MG/DL (ref 0.55–1.02)
ESTIMATED GLOMERULAR FILT RATE: 69 ML/MIN (ref 60–?)
GFR (AFRICAN AMERICAN): 84 ML/MIN (ref 60–?)
GLOBULIN SER CALC-MCNC: 3.5 GM/DL (ref 1.3–3.2)
GLUCOSE: 75 MG/DL (ref 74–106)
HCT VFR BLD CALC: 41.7 % (ref 37–47)
HGB BLD-MCNC: 13.6 G/DL (ref 12.2–16.2)
MCHC RBC-ENTMCNC: 32.5 G/DL (ref 31.8–35.4)
MCV RBC: 84.5 FL (ref 81–99)
MEAN CORPUSCULAR HEMOGLOBIN: 27.5 PG (ref 27–31.2)
PLATELET # BLD: 300 K/MM3 (ref 142–424)
POTASSIUM: 3.9 MMOL/L (ref 3.5–5.1)
PROT SERPL-MCNC: 6.9 GM/DL (ref 6.4–8.2)
RBC # BLD AUTO: 4.93 M/MM3 (ref 4.2–5.4)
SODIUM SPEC-SCNC: 143 MMOL/L (ref 136–145)
T4 FREE SERPL-MCNC: 0.71 NG/DL (ref 0.76–1.46)
TSH SERPL-ACNC: 3.53 UIU/ML (ref 0.36–3.74)
WBC # BLD AUTO: 8.9 K/MM3 (ref 4.8–10.8)

## 2019-12-27 PROCEDURE — 36415 COLL VENOUS BLD VENIPUNCTURE: CPT

## 2019-12-27 PROCEDURE — 82607 VITAMIN B-12: CPT

## 2019-12-27 PROCEDURE — 84439 ASSAY OF FREE THYROXINE: CPT

## 2019-12-27 PROCEDURE — 84443 ASSAY THYROID STIM HORMONE: CPT

## 2019-12-27 PROCEDURE — 80053 COMPREHEN METABOLIC PANEL: CPT

## 2019-12-27 PROCEDURE — 85025 COMPLETE CBC W/AUTO DIFF WBC: CPT

## 2019-12-29 LAB — VITAMIN B12: 207 PG/ML (ref 232–1245)

## 2019-12-30 ENCOUNTER — HOSPITAL ENCOUNTER (OUTPATIENT)
Age: 44
End: 2019-12-30
Payer: COMMERCIAL

## 2019-12-30 DIAGNOSIS — R00.2: Primary | ICD-10-CM

## 2019-12-30 PROCEDURE — 93226 XTRNL ECG REC<48 HR SCAN A/R: CPT

## 2019-12-30 PROCEDURE — 93225 XTRNL ECG REC<48 HRS REC: CPT

## 2020-01-07 ENCOUNTER — HOSPITAL ENCOUNTER (OUTPATIENT)
Age: 45
End: 2020-01-07
Payer: COMMERCIAL

## 2020-01-07 DIAGNOSIS — R42: Primary | ICD-10-CM

## 2020-01-07 DIAGNOSIS — H53.8: ICD-10-CM

## 2020-01-07 PROCEDURE — 93880 EXTRACRANIAL BILAT STUDY: CPT

## 2020-02-04 ENCOUNTER — HOSPITAL ENCOUNTER (OUTPATIENT)
Age: 45
End: 2020-02-04
Payer: COMMERCIAL

## 2020-02-04 DIAGNOSIS — R06.83: Primary | ICD-10-CM

## 2020-02-04 DIAGNOSIS — G47.33: ICD-10-CM

## 2020-02-04 DIAGNOSIS — R63.5: ICD-10-CM

## 2020-02-04 DIAGNOSIS — R53.83: ICD-10-CM

## 2020-02-04 DIAGNOSIS — R40.0: ICD-10-CM

## 2020-02-04 PROCEDURE — G0399 HOME SLEEP TEST/TYPE 3 PORTA: HCPCS

## 2020-02-13 ENCOUNTER — HOSPITAL ENCOUNTER (OUTPATIENT)
Age: 45
End: 2020-02-13
Payer: COMMERCIAL

## 2020-02-13 ENCOUNTER — HOSPITAL ENCOUNTER (OUTPATIENT)
Age: 45
End: 2020-02-13
Payer: SELF-PAY

## 2020-02-13 DIAGNOSIS — R63.5: ICD-10-CM

## 2020-02-13 DIAGNOSIS — R53.83: ICD-10-CM

## 2020-02-13 DIAGNOSIS — R94.31: ICD-10-CM

## 2020-02-13 DIAGNOSIS — R40.0: ICD-10-CM

## 2020-02-13 DIAGNOSIS — R06.83: ICD-10-CM

## 2020-02-13 DIAGNOSIS — K21.9: ICD-10-CM

## 2020-02-13 DIAGNOSIS — Z85.3: ICD-10-CM

## 2020-02-13 DIAGNOSIS — Z87.891: ICD-10-CM

## 2020-02-13 DIAGNOSIS — R00.2: Primary | ICD-10-CM

## 2020-02-13 PROCEDURE — 75571 CT HRT W/O DYE W/CA TEST: CPT

## 2020-02-13 PROCEDURE — 93017 CV STRESS TEST TRACING ONLY: CPT

## 2020-02-13 PROCEDURE — 93306 TTE W/DOPPLER COMPLETE: CPT

## 2020-02-13 PROCEDURE — 78452 HT MUSCLE IMAGE SPECT MULT: CPT

## 2020-02-13 PROCEDURE — A9502 TC99M TETROFOSMIN: HCPCS

## 2020-03-12 ENCOUNTER — LAB (OUTPATIENT)
Dept: LAB | Facility: HOSPITAL | Age: 45
End: 2020-03-12

## 2020-03-12 ENCOUNTER — OFFICE VISIT (OUTPATIENT)
Dept: ONCOLOGY | Facility: CLINIC | Age: 45
End: 2020-03-12

## 2020-03-12 VITALS
HEIGHT: 66 IN | WEIGHT: 202 LBS | SYSTOLIC BLOOD PRESSURE: 100 MMHG | DIASTOLIC BLOOD PRESSURE: 57 MMHG | TEMPERATURE: 97.8 F | HEART RATE: 62 BPM | OXYGEN SATURATION: 96 % | BODY MASS INDEX: 32.47 KG/M2 | RESPIRATION RATE: 18 BRPM

## 2020-03-12 DIAGNOSIS — C50.112 MALIGNANT NEOPLASM OF CENTRAL PORTION OF LEFT FEMALE BREAST, UNSPECIFIED ESTROGEN RECEPTOR STATUS (HCC): ICD-10-CM

## 2020-03-12 DIAGNOSIS — C50.112 MALIGNANT NEOPLASM OF CENTRAL PORTION OF LEFT FEMALE BREAST, UNSPECIFIED ESTROGEN RECEPTOR STATUS (HCC): Primary | ICD-10-CM

## 2020-03-12 LAB
ALBUMIN SERPL-MCNC: 4.3 G/DL (ref 3.5–5.2)
ALBUMIN/GLOB SERPL: 1.3 G/DL
ALP SERPL-CCNC: 66 U/L (ref 39–117)
ALT SERPL W P-5'-P-CCNC: 21 U/L (ref 1–33)
ANION GAP SERPL CALCULATED.3IONS-SCNC: 15 MMOL/L (ref 5–15)
AST SERPL-CCNC: 25 U/L (ref 1–32)
BILIRUB SERPL-MCNC: 0.4 MG/DL (ref 0.2–1.2)
BUN BLD-MCNC: 13 MG/DL (ref 6–20)
BUN/CREAT SERPL: 14 (ref 7–25)
CALCIUM SPEC-SCNC: 9.4 MG/DL (ref 8.6–10.5)
CHLORIDE SERPL-SCNC: 99 MMOL/L (ref 98–107)
CO2 SERPL-SCNC: 23 MMOL/L (ref 22–29)
CREAT BLD-MCNC: 0.93 MG/DL (ref 0.57–1)
ERYTHROCYTE [DISTWIDTH] IN BLOOD BY AUTOMATED COUNT: 13 % (ref 12.3–15.4)
GFR SERPL CREATININE-BSD FRML MDRD: 66 ML/MIN/1.73
GLOBULIN UR ELPH-MCNC: 3.4 GM/DL
GLUCOSE BLD-MCNC: 114 MG/DL (ref 65–99)
HCT VFR BLD AUTO: 41.4 % (ref 34–46.6)
HGB BLD-MCNC: 14.1 G/DL (ref 12–15.9)
LYMPHOCYTES # BLD AUTO: 3.6 10*3/MM3 (ref 0.7–3.1)
LYMPHOCYTES NFR BLD AUTO: 38.4 % (ref 19.6–45.3)
MCH RBC QN AUTO: 28.6 PG (ref 26.6–33)
MCHC RBC AUTO-ENTMCNC: 34 G/DL (ref 31.5–35.7)
MCV RBC AUTO: 84.1 FL (ref 79–97)
MONOCYTES # BLD AUTO: 0.5 10*3/MM3 (ref 0.1–0.9)
MONOCYTES NFR BLD AUTO: 4.8 % (ref 5–12)
NEUTROPHILS # BLD AUTO: 5.3 10*3/MM3 (ref 1.7–7)
NEUTROPHILS NFR BLD AUTO: 56.8 % (ref 42.7–76)
PLATELET # BLD AUTO: 272 10*3/MM3 (ref 140–450)
PMV BLD AUTO: 7.8 FL (ref 6–12)
POTASSIUM BLD-SCNC: 4 MMOL/L (ref 3.5–5.2)
PROT SERPL-MCNC: 7.7 G/DL (ref 6–8.5)
RBC # BLD AUTO: 4.92 10*6/MM3 (ref 3.77–5.28)
SODIUM BLD-SCNC: 137 MMOL/L (ref 136–145)
WBC NRBC COR # BLD: 9.4 10*3/MM3 (ref 3.4–10.8)

## 2020-03-12 PROCEDURE — 36415 COLL VENOUS BLD VENIPUNCTURE: CPT

## 2020-03-12 PROCEDURE — 99213 OFFICE O/P EST LOW 20 MIN: CPT | Performed by: INTERNAL MEDICINE

## 2020-03-12 PROCEDURE — 85025 COMPLETE CBC W/AUTO DIFF WBC: CPT

## 2020-03-12 PROCEDURE — 80053 COMPREHEN METABOLIC PANEL: CPT

## 2020-03-12 RX ORDER — MECLIZINE HYDROCHLORIDE 25 MG/1
25 TABLET ORAL 3 TIMES DAILY PRN
COMMUNITY
Start: 2020-03-09 | End: 2022-09-29

## 2020-03-12 RX ORDER — LEVOTHYROXINE SODIUM 0.03 MG/1
25 TABLET ORAL DAILY
COMMUNITY
Start: 2020-01-31 | End: 2022-09-29

## 2020-03-12 RX ORDER — KETOCONAZOLE 20 MG/ML
SHAMPOO TOPICAL
Status: ON HOLD | COMMUNITY
Start: 2020-02-04 | End: 2020-09-02

## 2020-03-12 RX ORDER — METOPROLOL SUCCINATE 25 MG/1
25 TABLET, EXTENDED RELEASE ORAL DAILY
COMMUNITY
Start: 2020-01-23 | End: 2021-09-23 | Stop reason: SDUPTHER

## 2020-03-12 NOTE — PROGRESS NOTES
"      PROBLEM LIST:  1. [id number] invasive ductal carcinoma of the left breast, ER positive [place] positive HER-2 negative  A) the patient presented with skin dimpling under the left breast. A needle localized biopsy was done on 2/8/2017. Pathology showed a 2.2 cm invasive ductal carcinoma. There were focally positive margins.   Bilateral mastectomy done on 4/12/17.  Pathology showed residual in situ DICIS.  0/1 SLN involved.  [id number] (Stage IIA).  Oncotype score 34 in the high risk group.  Adjuvant chemotherapy with taxotere and cyclophophamide started 5/9/17.  Long delay between cycle 1 and cycle 2 due to mastectomy wound infection.  Severe allergic reaction after cycle 3 with hives, tightness in throat, and swelling of face that lasted for over 2 weeks despite high dose steroids.  CMF given for cycle #4.  B) tamoxifen started October 2017. Enrolled in  of everolimus versus placebo.  Treatment stopped due to grade 4 hypertriglyceridemia.    Subjective     HISTORY OF PRESENT ILLNESS:   Mary Ambrosio returns for follow-up.  Since her last visit she has been started on a beta-blocker for palpitations, as well as meclizine for vertigo.  She says she is currently feeling well.  The hot flashes have decreased in frequency but she still has about 10 each day.  She is able to tolerate this.      Past Medical History, Past Surgical History, Social History, Family History have been reviewed and are without significant changes except as mentioned.    Review of Systems   A comprehensive 14 point review of systems was performed and was negative except as mentioned.    Medications:  The current medication list was reviewed in the EMR    ALLERGIES:    Allergies   Allergen Reactions   • Taxotere [Docetaxel] Hives and Shortness Of Breath   • Tegaderm Ag Mesh [Silver] Rash     Prolonged exposure        Objective      /57 Comment: RUE  Pulse 62   Temp 97.8 °F (36.6 °C) (Temporal)   Resp 18   Ht 167.6 cm (66\")   Wt " 91.6 kg (202 lb)   SpO2 96% Comment: RA  BMI 32.60 kg/m²      Performance Status: 0    General: well appearing female in no acute distress  Neuro: alert and oriented  HEENT: sclera anicteric, oropharynx clear  Lymphatics: no supraclavicular, or axillary adenopathy.   Chest: Status post bilateral mastectomy with implant reconstruction  cardiovascular: regular rate and rhythm, no murmurs  Lungs: clear to auscultation bilaterally  Abdomen: soft, nontender, nondistended.  No palpable organomegaly  Extremeties: no lower extremity edema.    Skin: No rashes, lesions, or bruising  Psych: Mood and affect appropriate    Lab Results   Component Value Date    WBC 9.40 03/12/2020    HGB 14.1 03/12/2020    HCT 41.4 03/12/2020    MCV 84.1 03/12/2020     03/12/2020     Lab Results   Component Value Date    GLUCOSE 93 07/05/2019    BUN 10 07/05/2019    CREATININE 0.85 07/05/2019    EGFRIFNONA 73 07/05/2019    BCR 11.8 07/05/2019    K 4.2 07/05/2019    CO2 23.0 07/05/2019    CALCIUM 9.3 07/05/2019    ALBUMIN 4.13 10/02/2018    AST 29 10/02/2018    ALT 37 10/02/2018         Assessment/Plan   Mary Ambrosio is a 44 y.o. year old female with stage II a ER positive [place] positive HER-2 negative invasive ductal carcinoma of the left breast who returns for follow up on tamoxifen.  She is doing well with no evidence of disease recurrence.       We will space out her visits to every 6 months.  I will check labs on her return.      I spent 15 minutes with the patient. I spent > 50% percent of this time counseling and discussing prognosis, diagnostic testing, evaluation, current status and management.        Nancy Caruso MD  [facility]    10/02/2018          CC:

## 2020-04-16 DIAGNOSIS — Z17.0 MALIGNANT NEOPLASM OF CENTRAL PORTION OF LEFT BREAST IN FEMALE, ESTROGEN RECEPTOR POSITIVE (HCC): ICD-10-CM

## 2020-04-16 DIAGNOSIS — C50.112 MALIGNANT NEOPLASM OF CENTRAL PORTION OF LEFT BREAST IN FEMALE, ESTROGEN RECEPTOR POSITIVE (HCC): ICD-10-CM

## 2020-04-16 RX ORDER — VENLAFAXINE HYDROCHLORIDE 75 MG/1
CAPSULE, EXTENDED RELEASE ORAL
Qty: 90 CAPSULE | Refills: 3 | Status: SHIPPED | OUTPATIENT
Start: 2020-04-16 | End: 2020-08-31

## 2020-04-24 ENCOUNTER — HOSPITAL ENCOUNTER (OUTPATIENT)
Age: 45
End: 2020-04-24
Payer: COMMERCIAL

## 2020-04-24 DIAGNOSIS — G47.33: Primary | ICD-10-CM

## 2020-04-24 PROCEDURE — 94762 N-INVAS EAR/PLS OXIMTRY CONT: CPT

## 2020-05-06 ENCOUNTER — HOSPITAL ENCOUNTER (OUTPATIENT)
Age: 45
End: 2020-05-06
Payer: COMMERCIAL

## 2020-05-06 DIAGNOSIS — E53.8: ICD-10-CM

## 2020-05-06 DIAGNOSIS — E03.9: Primary | ICD-10-CM

## 2020-05-06 LAB
T4 FREE SERPL-MCNC: 0.95 NG/DL (ref 0.78–2.19)
TSH SERPL-ACNC: 1.24 UIU/ML (ref 0.47–4.68)

## 2020-05-06 PROCEDURE — 36415 COLL VENOUS BLD VENIPUNCTURE: CPT

## 2020-05-06 PROCEDURE — 82607 VITAMIN B-12: CPT

## 2020-05-06 PROCEDURE — 84439 ASSAY OF FREE THYROXINE: CPT

## 2020-05-06 PROCEDURE — 84443 ASSAY THYROID STIM HORMONE: CPT

## 2020-05-08 LAB — VITAMIN B12: 1175 PG/ML (ref 232–1245)

## 2020-05-14 ENCOUNTER — HOSPITAL ENCOUNTER (OUTPATIENT)
Age: 45
End: 2020-05-14
Payer: COMMERCIAL

## 2020-05-14 DIAGNOSIS — R51: ICD-10-CM

## 2020-05-14 DIAGNOSIS — R42: Primary | ICD-10-CM

## 2020-05-14 PROCEDURE — A9576 INJ PROHANCE MULTIPACK: HCPCS

## 2020-05-14 PROCEDURE — 70553 MRI BRAIN STEM W/O & W/DYE: CPT

## 2020-05-28 ENCOUNTER — HOSPITAL ENCOUNTER (OUTPATIENT)
Age: 45
End: 2020-05-28
Payer: COMMERCIAL

## 2020-05-28 DIAGNOSIS — E23.6: Primary | ICD-10-CM

## 2020-06-01 ENCOUNTER — HOSPITAL ENCOUNTER (OUTPATIENT)
Age: 45
End: 2020-06-01
Payer: COMMERCIAL

## 2020-06-01 DIAGNOSIS — E23.6: Primary | ICD-10-CM

## 2020-06-01 PROCEDURE — 70553 MRI BRAIN STEM W/O & W/DYE: CPT

## 2020-06-01 PROCEDURE — A9576 INJ PROHANCE MULTIPACK: HCPCS

## 2020-08-31 ENCOUNTER — APPOINTMENT (OUTPATIENT)
Dept: PREADMISSION TESTING | Facility: HOSPITAL | Age: 45
End: 2020-08-31

## 2020-08-31 VITALS — WEIGHT: 214.29 LBS | BODY MASS INDEX: 34.44 KG/M2 | HEIGHT: 66 IN

## 2020-08-31 LAB
ANION GAP SERPL CALCULATED.3IONS-SCNC: 14 MMOL/L (ref 5–15)
BUN SERPL-MCNC: 10 MG/DL (ref 6–20)
BUN/CREAT SERPL: 10.3 (ref 7–25)
CALCIUM SPEC-SCNC: 9.7 MG/DL (ref 8.6–10.5)
CHLORIDE SERPL-SCNC: 102 MMOL/L (ref 98–107)
CO2 SERPL-SCNC: 23 MMOL/L (ref 22–29)
CREAT SERPL-MCNC: 0.97 MG/DL (ref 0.57–1)
DEPRECATED RDW RBC AUTO: 40.6 FL (ref 37–54)
ERYTHROCYTE [DISTWIDTH] IN BLOOD BY AUTOMATED COUNT: 12.5 % (ref 12.3–15.4)
GFR SERPL CREATININE-BSD FRML MDRD: 62 ML/MIN/1.73
GLUCOSE SERPL-MCNC: 82 MG/DL (ref 65–99)
HCT VFR BLD AUTO: 45 % (ref 34–46.6)
HGB BLD-MCNC: 14.3 G/DL (ref 12–15.9)
MCH RBC QN AUTO: 27.9 PG (ref 26.6–33)
MCHC RBC AUTO-ENTMCNC: 31.8 G/DL (ref 31.5–35.7)
MCV RBC AUTO: 87.7 FL (ref 79–97)
PLATELET # BLD AUTO: 326 10*3/MM3 (ref 140–450)
PMV BLD AUTO: 9.6 FL (ref 6–12)
POTASSIUM SERPL-SCNC: 4.4 MMOL/L (ref 3.5–5.2)
RBC # BLD AUTO: 5.13 10*6/MM3 (ref 3.77–5.28)
SODIUM SERPL-SCNC: 139 MMOL/L (ref 136–145)
WBC # BLD AUTO: 9.45 10*3/MM3 (ref 3.4–10.8)

## 2020-08-31 PROCEDURE — U0002 COVID-19 LAB TEST NON-CDC: HCPCS

## 2020-08-31 PROCEDURE — 36415 COLL VENOUS BLD VENIPUNCTURE: CPT

## 2020-08-31 PROCEDURE — C9803 HOPD COVID-19 SPEC COLLECT: HCPCS

## 2020-08-31 PROCEDURE — U0004 COV-19 TEST NON-CDC HGH THRU: HCPCS

## 2020-08-31 PROCEDURE — 85027 COMPLETE CBC AUTOMATED: CPT | Performed by: PLASTIC SURGERY

## 2020-08-31 PROCEDURE — 80048 BASIC METABOLIC PNL TOTAL CA: CPT | Performed by: PLASTIC SURGERY

## 2020-08-31 RX ORDER — VENLAFAXINE HYDROCHLORIDE 150 MG/1
150 CAPSULE, EXTENDED RELEASE ORAL DAILY
COMMUNITY
End: 2021-03-17 | Stop reason: SDUPTHER

## 2020-09-01 ENCOUNTER — ANESTHESIA EVENT (OUTPATIENT)
Dept: PERIOP | Facility: HOSPITAL | Age: 45
End: 2020-09-01

## 2020-09-01 LAB
REF LAB TEST METHOD: NORMAL
SARS-COV-2 RNA RESP QL NAA+PROBE: NOT DETECTED

## 2020-09-01 RX ORDER — FAMOTIDINE 10 MG/ML
20 INJECTION, SOLUTION INTRAVENOUS ONCE
Status: CANCELLED | OUTPATIENT
Start: 2020-09-01 | End: 2020-09-01

## 2020-09-02 ENCOUNTER — HOSPITAL ENCOUNTER (OUTPATIENT)
Facility: HOSPITAL | Age: 45
Setting detail: SURGERY ADMIT
Discharge: HOME | End: 2020-09-02
Attending: PLASTIC SURGERY | Admitting: PLASTIC SURGERY

## 2020-09-02 ENCOUNTER — ANESTHESIA (OUTPATIENT)
Dept: PERIOP | Facility: HOSPITAL | Age: 45
End: 2020-09-02

## 2020-09-02 VITALS
OXYGEN SATURATION: 93 % | HEIGHT: 66 IN | TEMPERATURE: 97.8 F | RESPIRATION RATE: 16 BRPM | BODY MASS INDEX: 34.39 KG/M2 | SYSTOLIC BLOOD PRESSURE: 102 MMHG | DIASTOLIC BLOOD PRESSURE: 56 MMHG | HEART RATE: 76 BPM | WEIGHT: 214 LBS

## 2020-09-02 PROCEDURE — 25010000002 PROPOFOL 10 MG/ML EMULSION: Performed by: NURSE ANESTHETIST, CERTIFIED REGISTERED

## 2020-09-02 PROCEDURE — 25010000003 CEFAZOLIN PER 500 MG: Performed by: PLASTIC SURGERY

## 2020-09-02 PROCEDURE — 25010000002 NEOSTIGMINE 10 MG/10ML SOLUTION: Performed by: NURSE ANESTHETIST, CERTIFIED REGISTERED

## 2020-09-02 PROCEDURE — 94640 AIRWAY INHALATION TREATMENT: CPT

## 2020-09-02 PROCEDURE — C1789 PROSTHESIS, BREAST, IMP: HCPCS | Performed by: PLASTIC SURGERY

## 2020-09-02 PROCEDURE — 25010000002 DEXAMETHASONE PER 1 MG: Performed by: NURSE ANESTHETIST, CERTIFIED REGISTERED

## 2020-09-02 PROCEDURE — 25010000003 CEFAZOLIN IN DEXTROSE 2-4 GM/100ML-% SOLUTION: Performed by: PLASTIC SURGERY

## 2020-09-02 PROCEDURE — 25010000002 ONDANSETRON PER 1 MG: Performed by: NURSE ANESTHETIST, CERTIFIED REGISTERED

## 2020-09-02 PROCEDURE — 81025 URINE PREGNANCY TEST: CPT | Performed by: ANESTHESIOLOGY

## 2020-09-02 PROCEDURE — 25010000002 FENTANYL CITRATE (PF) 100 MCG/2ML SOLUTION: Performed by: NURSE ANESTHETIST, CERTIFIED REGISTERED

## 2020-09-02 PROCEDURE — 25010000002 GENTAMICIN PER 80 MG: Performed by: PLASTIC SURGERY

## 2020-09-02 DEVICE — BRST GEL NATRELLE INSPIRA SFT TCH XF/P 800CC: Type: IMPLANTABLE DEVICE | Site: BREAST | Status: FUNCTIONAL

## 2020-09-02 DEVICE — DEV CONTRL TISS STRATAFIX SPIRAL MNCRYL UD 3/0 PLS 60CM: Type: IMPLANTABLE DEVICE | Site: BREAST | Status: FUNCTIONAL

## 2020-09-02 RX ORDER — LABETALOL HYDROCHLORIDE 5 MG/ML
5 INJECTION, SOLUTION INTRAVENOUS
Status: DISCONTINUED | OUTPATIENT
Start: 2020-09-02 | End: 2020-09-02 | Stop reason: HOSPADM

## 2020-09-02 RX ORDER — LIDOCAINE HYDROCHLORIDE AND EPINEPHRINE 10; 10 MG/ML; UG/ML
INJECTION, SOLUTION INFILTRATION; PERINEURAL AS NEEDED
Status: DISCONTINUED | OUTPATIENT
Start: 2020-09-02 | End: 2020-09-02 | Stop reason: HOSPADM

## 2020-09-02 RX ORDER — SODIUM CHLORIDE 0.9 % (FLUSH) 0.9 %
3 SYRINGE (ML) INJECTION EVERY 12 HOURS SCHEDULED
Status: DISCONTINUED | OUTPATIENT
Start: 2020-09-02 | End: 2020-09-02 | Stop reason: HOSPADM

## 2020-09-02 RX ORDER — SODIUM CHLORIDE 0.9 % (FLUSH) 0.9 %
10 SYRINGE (ML) INJECTION AS NEEDED
Status: DISCONTINUED | OUTPATIENT
Start: 2020-09-02 | End: 2020-09-02 | Stop reason: HOSPADM

## 2020-09-02 RX ORDER — DEXAMETHASONE SODIUM PHOSPHATE 4 MG/ML
INJECTION, SOLUTION INTRA-ARTICULAR; INTRALESIONAL; INTRAMUSCULAR; INTRAVENOUS; SOFT TISSUE AS NEEDED
Status: DISCONTINUED | OUTPATIENT
Start: 2020-09-02 | End: 2020-09-02 | Stop reason: SURG

## 2020-09-02 RX ORDER — GLYCOPYRROLATE 0.2 MG/ML
INJECTION INTRAMUSCULAR; INTRAVENOUS AS NEEDED
Status: DISCONTINUED | OUTPATIENT
Start: 2020-09-02 | End: 2020-09-02 | Stop reason: SURG

## 2020-09-02 RX ORDER — LIDOCAINE HYDROCHLORIDE 10 MG/ML
INJECTION, SOLUTION EPIDURAL; INFILTRATION; INTRACAUDAL; PERINEURAL AS NEEDED
Status: DISCONTINUED | OUTPATIENT
Start: 2020-09-02 | End: 2020-09-02 | Stop reason: SURG

## 2020-09-02 RX ORDER — IPRATROPIUM BROMIDE AND ALBUTEROL SULFATE 2.5; .5 MG/3ML; MG/3ML
3 SOLUTION RESPIRATORY (INHALATION) ONCE AS NEEDED
Status: COMPLETED | OUTPATIENT
Start: 2020-09-02 | End: 2020-09-02

## 2020-09-02 RX ORDER — FENTANYL CITRATE 50 UG/ML
50 INJECTION, SOLUTION INTRAMUSCULAR; INTRAVENOUS
Status: DISCONTINUED | OUTPATIENT
Start: 2020-09-02 | End: 2020-09-02 | Stop reason: HOSPADM

## 2020-09-02 RX ORDER — FENTANYL CITRATE 50 UG/ML
INJECTION, SOLUTION INTRAMUSCULAR; INTRAVENOUS AS NEEDED
Status: DISCONTINUED | OUTPATIENT
Start: 2020-09-02 | End: 2020-09-02 | Stop reason: SURG

## 2020-09-02 RX ORDER — OXYCODONE HYDROCHLORIDE AND ACETAMINOPHEN 5; 325 MG/1; MG/1
1 TABLET ORAL ONCE AS NEEDED
Status: COMPLETED | OUTPATIENT
Start: 2020-09-02 | End: 2020-09-02

## 2020-09-02 RX ORDER — LIDOCAINE HYDROCHLORIDE 10 MG/ML
0.5 INJECTION, SOLUTION EPIDURAL; INFILTRATION; INTRACAUDAL; PERINEURAL ONCE AS NEEDED
Status: COMPLETED | OUTPATIENT
Start: 2020-09-02 | End: 2020-09-02

## 2020-09-02 RX ORDER — MAGNESIUM HYDROXIDE 1200 MG/15ML
LIQUID ORAL AS NEEDED
Status: DISCONTINUED | OUTPATIENT
Start: 2020-09-02 | End: 2020-09-02 | Stop reason: HOSPADM

## 2020-09-02 RX ORDER — LANOLIN ALCOHOL/MO/W.PET/CERES
1000 CREAM (GRAM) TOPICAL DAILY
COMMUNITY
End: 2022-09-29

## 2020-09-02 RX ORDER — SODIUM CHLORIDE 0.9 % (FLUSH) 0.9 %
10 SYRINGE (ML) INJECTION EVERY 12 HOURS SCHEDULED
Status: DISCONTINUED | OUTPATIENT
Start: 2020-09-02 | End: 2020-09-02 | Stop reason: HOSPADM

## 2020-09-02 RX ORDER — HYDRALAZINE HYDROCHLORIDE 20 MG/ML
5 INJECTION INTRAMUSCULAR; INTRAVENOUS
Status: DISCONTINUED | OUTPATIENT
Start: 2020-09-02 | End: 2020-09-02 | Stop reason: HOSPADM

## 2020-09-02 RX ORDER — FAMOTIDINE 20 MG/1
20 TABLET, FILM COATED ORAL ONCE
Status: COMPLETED | OUTPATIENT
Start: 2020-09-02 | End: 2020-09-02

## 2020-09-02 RX ORDER — SODIUM CHLORIDE, SODIUM LACTATE, POTASSIUM CHLORIDE, CALCIUM CHLORIDE 600; 310; 30; 20 MG/100ML; MG/100ML; MG/100ML; MG/100ML
9 INJECTION, SOLUTION INTRAVENOUS CONTINUOUS
Status: DISCONTINUED | OUTPATIENT
Start: 2020-09-02 | End: 2020-09-02 | Stop reason: HOSPADM

## 2020-09-02 RX ORDER — CEFAZOLIN SODIUM 2 G/100ML
2 INJECTION, SOLUTION INTRAVENOUS ONCE
Status: COMPLETED | OUTPATIENT
Start: 2020-09-02 | End: 2020-09-02

## 2020-09-02 RX ORDER — HYDROMORPHONE HYDROCHLORIDE 1 MG/ML
0.5 INJECTION, SOLUTION INTRAMUSCULAR; INTRAVENOUS; SUBCUTANEOUS
Status: DISCONTINUED | OUTPATIENT
Start: 2020-09-02 | End: 2020-09-02 | Stop reason: HOSPADM

## 2020-09-02 RX ORDER — ROCURONIUM BROMIDE 10 MG/ML
INJECTION, SOLUTION INTRAVENOUS AS NEEDED
Status: DISCONTINUED | OUTPATIENT
Start: 2020-09-02 | End: 2020-09-02 | Stop reason: SURG

## 2020-09-02 RX ORDER — SODIUM CHLORIDE 0.9 % (FLUSH) 0.9 %
3-10 SYRINGE (ML) INJECTION AS NEEDED
Status: DISCONTINUED | OUTPATIENT
Start: 2020-09-02 | End: 2020-09-02 | Stop reason: HOSPADM

## 2020-09-02 RX ORDER — MEPERIDINE HYDROCHLORIDE 25 MG/ML
12.5 INJECTION INTRAMUSCULAR; INTRAVENOUS; SUBCUTANEOUS
Status: DISCONTINUED | OUTPATIENT
Start: 2020-09-02 | End: 2020-09-02 | Stop reason: HOSPADM

## 2020-09-02 RX ORDER — PROPOFOL 10 MG/ML
VIAL (ML) INTRAVENOUS AS NEEDED
Status: DISCONTINUED | OUTPATIENT
Start: 2020-09-02 | End: 2020-09-02 | Stop reason: SURG

## 2020-09-02 RX ORDER — ESMOLOL HYDROCHLORIDE 10 MG/ML
INJECTION INTRAVENOUS AS NEEDED
Status: DISCONTINUED | OUTPATIENT
Start: 2020-09-02 | End: 2020-09-02 | Stop reason: SURG

## 2020-09-02 RX ORDER — NALOXONE HCL 0.4 MG/ML
0.4 VIAL (ML) INJECTION AS NEEDED
Status: DISCONTINUED | OUTPATIENT
Start: 2020-09-02 | End: 2020-09-02 | Stop reason: HOSPADM

## 2020-09-02 RX ORDER — ONDANSETRON 2 MG/ML
INJECTION INTRAMUSCULAR; INTRAVENOUS AS NEEDED
Status: DISCONTINUED | OUTPATIENT
Start: 2020-09-02 | End: 2020-09-02 | Stop reason: SURG

## 2020-09-02 RX ORDER — ONDANSETRON 2 MG/ML
4 INJECTION INTRAMUSCULAR; INTRAVENOUS ONCE AS NEEDED
Status: DISCONTINUED | OUTPATIENT
Start: 2020-09-02 | End: 2020-09-02 | Stop reason: HOSPADM

## 2020-09-02 RX ORDER — NEOSTIGMINE METHYLSULFATE 1 MG/ML
INJECTION, SOLUTION INTRAVENOUS AS NEEDED
Status: DISCONTINUED | OUTPATIENT
Start: 2020-09-02 | End: 2020-09-02 | Stop reason: SURG

## 2020-09-02 RX ADMIN — FAMOTIDINE 20 MG: 20 TABLET, FILM COATED ORAL at 06:38

## 2020-09-02 RX ADMIN — ESMOLOL HYDROCHLORIDE 20 MG: 10 INJECTION, SOLUTION INTRAVENOUS at 07:45

## 2020-09-02 RX ADMIN — ONDANSETRON 4 MG: 2 INJECTION INTRAMUSCULAR; INTRAVENOUS at 09:33

## 2020-09-02 RX ADMIN — GLYCOPYRROLATE 0.4 MG: 0.2 INJECTION INTRAMUSCULAR; INTRAVENOUS at 09:33

## 2020-09-02 RX ADMIN — FENTANYL CITRATE 50 MCG: 50 INJECTION, SOLUTION INTRAMUSCULAR; INTRAVENOUS at 09:12

## 2020-09-02 RX ADMIN — LIDOCAINE HYDROCHLORIDE 50 MG: 10 INJECTION, SOLUTION EPIDURAL; INFILTRATION; INTRACAUDAL; PERINEURAL at 07:34

## 2020-09-02 RX ADMIN — DEXAMETHASONE SODIUM PHOSPHATE 8 MG: 4 INJECTION, SOLUTION INTRAMUSCULAR; INTRAVENOUS at 07:34

## 2020-09-02 RX ADMIN — ROCURONIUM BROMIDE 30 MG: 10 INJECTION INTRAVENOUS at 07:34

## 2020-09-02 RX ADMIN — SODIUM CHLORIDE, POTASSIUM CHLORIDE, SODIUM LACTATE AND CALCIUM CHLORIDE 9 ML/HR: 600; 310; 30; 20 INJECTION, SOLUTION INTRAVENOUS at 06:39

## 2020-09-02 RX ADMIN — PROPOFOL 50 MG: 10 INJECTION, EMULSION INTRAVENOUS at 09:11

## 2020-09-02 RX ADMIN — ESMOLOL HYDROCHLORIDE 30 MG: 10 INJECTION, SOLUTION INTRAVENOUS at 07:34

## 2020-09-02 RX ADMIN — IPRATROPIUM BROMIDE AND ALBUTEROL SULFATE 3 ML: 2.5; .5 SOLUTION RESPIRATORY (INHALATION) at 09:57

## 2020-09-02 RX ADMIN — OXYCODONE HYDROCHLORIDE AND ACETAMINOPHEN 1 TABLET: 5; 325 TABLET ORAL at 10:42

## 2020-09-02 RX ADMIN — EPHEDRINE SULFATE 5 MG: 50 INJECTION INTRAMUSCULAR; INTRAVENOUS; SUBCUTANEOUS at 08:30

## 2020-09-02 RX ADMIN — FENTANYL CITRATE 50 MCG: 50 INJECTION, SOLUTION INTRAMUSCULAR; INTRAVENOUS at 07:58

## 2020-09-02 RX ADMIN — LIDOCAINE HYDROCHLORIDE: 10 INJECTION, SOLUTION EPIDURAL; INFILTRATION; INTRACAUDAL; PERINEURAL at 06:38

## 2020-09-02 RX ADMIN — NEOSTIGMINE 3 MG: 1 INJECTION INTRAVENOUS at 09:33

## 2020-09-02 RX ADMIN — PROPOFOL 25 MCG/KG/MIN: 10 INJECTION, EMULSION INTRAVENOUS at 07:40

## 2020-09-02 RX ADMIN — CEFAZOLIN SODIUM 2 G: 2 INJECTION, SOLUTION INTRAVENOUS at 07:28

## 2020-09-02 RX ADMIN — PROPOFOL 200 MG: 10 INJECTION, EMULSION INTRAVENOUS at 07:34

## 2020-09-02 NOTE — BRIEF OP NOTE
BREAST IMPLANT REVISION AND/OR REMOVAL  Progress Note    Mary Ambrosio  9/2/2020    Pre-op Diagnosis:   * Acquired absence of breast and absent nipple, bilateral [Z90.13]     * Breast asymmetry [N64.89]     * Personal history of breast cancer [Z85.3]     * Disorder of breast implant [T85.9XXA]       Post-Op Diagnosis Codes:     * Acquired absence of breast and absent nipple, bilateral [Z90.13]     * Breast asymmetry [N64.89]     * Personal history of breast cancer [Z85.3]     * Disorder of breast implant [T85.9XXA]    Procedure/CPT® Codes:  MI DELAY BREAST PROS AFTER BREAST SURG [78340]  MI RECMPL WND TRUNK 2.6-7.5 CM [56183]  MI REP,SKIN,TRUNK,CMPLX,+5 CM/< [28306]      Procedure(s):  BREAST IMPLANT EXCHANGE WITH POCKET WORK AND COMPLEX CLOSURE BILATERAL    Surgeon(s):  Rogelio Dumont MD    Anesthesia: General    Staff:   Circulator: Eulalia Dennison RN  Scrub Person: Hailey Austin  Assistant: Radha Givens PA  Orientee: Rachel Kidd RN  Assistant: Radha Givens PA      Estimated Blood Loss: minimal    Urine Voided: * No values recorded between 9/2/2020  7:28 AM and 9/2/2020  9:33 AM *    Specimens:                None          Drains: * No LDAs found *    Findings: absent breast    Complications: none immediate    Assistant: Radha Givens PA  was responsible for performing the following activities: Retraction, Suction, Irrigation, Suturing, Closing and Placing Dressing and their skilled assistance was necessary for the success of this case.    Rogelio Dumont MD     Date: 9/2/2020  Time: 09:33

## 2020-09-02 NOTE — ANESTHESIA PROCEDURE NOTES
Airway  Urgency: elective    Date/Time: 9/2/2020 7:36 AM  Airway not difficult    General Information and Staff    Patient location during procedure: OR  CRNA: Radha Gutierrez CRNA    Indications and Patient Condition  Indications for airway management: airway protection    Preoxygenated: yes  MILS not maintained throughout  Mask difficulty assessment: 2 - vent by mask + OA or adjuvant +/- NMBA    Final Airway Details  Final airway type: endotracheal airway      Successful airway: ETT  Cuffed: yes   Successful intubation technique: direct laryngoscopy  Facilitating devices/methods: intubating stylet  Endotracheal tube insertion site: oral  Blade: Olvin  Blade size: 3  ETT size (mm): 7.0  Cormack-Lehane Classification: grade I - full view of glottis  Placement verified by: chest auscultation and capnometry   Measured from: lips  ETT/EBT  to lips (cm): 21  Number of attempts at approach: 1  Assessment: lips, teeth, and gum same as pre-op and atraumatic intubation    Additional Comments  Negative epigastric sounds, Breath sound equal bilaterally with symmetric chest rise and fall

## 2020-09-02 NOTE — H&P
Patient Care Team:      Chief complaint:   Breast implants have flipped    Subjective:  Patient is a 45 y.o.female presents with a history of breast cancer.  She underwent bilateral mastectomies with reconstruction on 4/2017.  Implants placed 12/2017.  All surgical wounds healed well.   She states that both implants have flipped.  She is here today for revision.    Review of Systems:  General ROS: negative  Cardiovascular ROS: no chest pain or dyspnea on exertion  Respiratory ROS: no cough, shortness of breath, or wheezing      Allergies:   Allergies   Allergen Reactions   • Taxotere [Docetaxel] Hives and Shortness Of Breath   • Tegaderm Ag Mesh [Silver] Rash     Prolonged exposure           Latex: no  Contrast Dye: no    Home Meds    Medications Prior to Admission   Medication Sig Dispense Refill Last Dose   • levothyroxine (SYNTHROID, LEVOTHROID) 25 MCG tablet Take 25 mcg by mouth Daily.   9/2/2020 at 0600   • meclizine (ANTIVERT) 25 MG tablet Take 25 mg by mouth 3 (Three) Times a Day As Needed.   9/2/2020 at 0600   • metoprolol succinate XL (TOPROL-XL) 25 MG 24 hr tablet Take 25 mg by mouth Daily.   9/2/2020 at 0600   • OTEZLA 30 MG tablet Take 30 mg by mouth 2 (Two) Times a Day.   9/2/2020 at 0600   • tamoxifen (NOLVADEX) 20 MG chemo tablet TAKE 1 TABLET BY MOUTH ONCE DAILY (Patient taking differently: Take 20 mg by mouth Daily.) 30 tablet 11 9/2/2020 at 0600   • venlafaxine XR (EFFEXOR-XR) 150 MG 24 hr capsule Take 150 mg by mouth Daily.   9/2/2020 at 0600   • vitamin B-12 (CYANOCOBALAMIN) 1000 MCG tablet Take 1,000 mcg by mouth Daily.   9/2/2020 at 0600     PMH:   Past Medical History:   Diagnosis Date   • Cancer (CMS/[facility]) 02/2017    breast- LEFT    • Dental bridge present     TOP FOR INVISALINE PLACEMENT    • Depression    • Disease of thyroid gland    • Dizzy    • Hot flashes    • Migraines    • Psoriasis     SCALP PRN SHAMPOO    • Seasonal allergies    • Sleep apnea     CPAP COMPLIANT    • Vertigo     PRN  MECLAZINE      PSH:    Past Surgical History:   Procedure Laterality Date   • ABDOMINAL SURGERY     • BREAST BIOPSY Left 01/20/2017   • BREAST IMPLANT SURGERY Bilateral 8/8/2018    Procedure: BREAST IMPLANT REVISION BILATERAL - EXCISION OF REDUNTANT SKIN;  Surgeon: Kym Burnett MD;  Location:  [facility];  Service: Plastics   • BREAST LUMPECTOMY Left 2/8/2018   • BREAST RECONSTRUCTION, BREAST TISSUE EXPANDER INSERTION Bilateral 4/12/2017    Procedure: BILATERAL IMMED STAGED BREAST RECONSTRUCTION WITH EXPANDERS AND ALLODERM;  Surgeon: Kym Burnett MD;  Location:  [facility];  Service:    • BREAST RECONSTRUCTION, BREAST TISSUE EXPANDER REMOVAL, IMPLANT INSERTION Bilateral 12/13/2017    Procedure: EXCHANGE BILATERAL TISSUE EXPANDERS  TO PERMANENT IMPLANTS;  Surgeon: Kym Burnett MD;  Location:  [facility];  Service:    • CHOLECYSTECTOMY  ~5/2018   • DILATATION AND CURETTAGE  1999   • FAT GRAFTING Bilateral 7/11/2019    Procedure: RECONSTRUCTED BREAST REVISION WITH FAT GRAFTING BILATERAL;  Surgeon: Rogelio Dumont MD;  Location:  [facility];  Service: Plastics   • HYSTEROSCOPY ENDOMETRIAL ABLATION TUBAL STERILIZATION  07/2014   • LASER ABLATION     • LIPOMA EXCISION  07/2014    Right rib cage   • MASTECTOMY WITH SENTINEL NODE BIOPSY AND AXILLARY NODE DISSECTION Bilateral 4/12/2017    Procedure: LEFT BREAST MASTECTOMY WITH LEFT SENTINEL NODE BIOPSY AND POSS AXILLARY NODE DISSECTION, RIGHT PROPHYLATIC MASTECTOMY;  Surgeon: Patrice Almonte MD;  Location:  [facility];  Service:    • TONSILLECTOMY  1992   • TUBAL ABDOMINAL LIGATION     • WOUND CLOSURE Bilateral 7/11/2019    Procedure: COMPLEX CLOSURE OF RIGHT BREAST;  Surgeon: Rogelio Dumont MD;  Location:  [facility];  Service: Plastics     Immunization History: pneumo: no  Flu: no  Tetanus: no  Social History:   Tobacco: former   Alcohol: yes      Physical Exam:/66 (BP Location: Right arm, Patient Position: Lying)   Pulse 64   Temp 97.4 °F (36.3  "°C) (Tympanic)   Resp 16   Ht 167.6 cm (66\")   Wt 97.1 kg (214 lb)   SpO2 97%   BMI 34.54 kg/m²       General Appearance:    Alert, cooperative, no distress, appears stated age   Head:    Normocephalic, without obvious abnormality, atraumatic   Lungs:     Clear to auscultation bilaterally, respirations unlabored    Heart: Regular rate and rhythm, S1 and S2 normal, no murmur, rub    or gallop    Abdomen:    Soft without tenderness   Breast Exam:    deferred   Genitalia:    deferred   Extremities:   Extremities normal, atraumatic, no cyanosis or edema   Skin:   Skin color, texture, turgor normal, no rashes or lesions   Neurologic:   Grossly intact     Results Review: CBC, Chem profile on chart.  Covid-neg    Impression: S/P bilateral mastectomies with reconstruction    Plan: For bilateral breast exchange with pocket work and complex closure today  SIVA Carlson 9/2/2020 06:44          "

## 2020-09-02 NOTE — OP NOTE
Preoperative diagnosis: 1.  Personal history of breast cancer  2.  Bilateral absent breast  3.  Breast asymmetry  4.  Breast implant disorder    Postoperative diagnosis: 1.  Personal history of breast cancer  2.  Bilateral absent breast  3.  Breast asymmetry  4.  Breast implant disorder    Surgeon: Rogelio Dumont MD    Assistant: Radha PANIAGUA was responsible for performing the following activities: Retraction, Suction, Irrigation, Suturing, Closing and Placing Dressing and their skilled assistance was necessary for the success of this case.    Anesthesia: General    Procedure: 1.  Bilateral revision of her reconstructed breast implant exchange with significant capsular work.  The silicone gel implants are Natrelle and Spira soft touch style SS X volume 800 mL.  The serial number on the left is [id number].  The serial number on the right is [id number].  2.  Complex closure by bilateral reconstructed breast measuring 56 cm.    Indication: The patient is a 45-year-old white female who underwent bilateral skin sparing mastectomy for breast cancer followed by bilateral implant-based breast reconstruction by another surgeon.  She transferred to my care and underwent bilateral revision of her reconstructed breast with fat grafting. She recently presented to my office with malrotated breast implants.  I recommended revision with exchange of her implants to a soft touch implant as well as pocket work.  Also, she is noted to have scar adhesion along the entirety of her mastectomy scar.  I recommended complex closure of her bilateral mastectomy scars.  All techniques, potential complications and typical postoperative course were discussed with the patient.  She indicated understanding and wished to proceed.    Findings: 1.  Bilateral absent breast  2.  Malrotated breast implants  3.  Lower right reconstructed breast footprint  4.  Subcutaneous location of her implant pocket with a prior muscle dissection either for breast  augmentation or a prior breast reconstruction    Description: The patient was taken from preoperative holding to the operating room after an informed consent was signed on the chart and placed under general anesthesia successfully.  Prior to induction of anesthesia, the patient received a prophylactic dose of antibiotics and had bilateral lower extremity sequential compression devices in place and operational.  She was placed supine on the operating table.  She had a pillow placed beneath her knees.  Her arms were gently abducted to 90 degrees and she had ulnar nerve padding.  Her chest wall was prepped and draped in the usual sterile fashion.  After properly identifying the patient the patient's problem, her bilateral mastectomy incisions were injected with 1% lidocaine with 1-100,000 epinephrine.  I commenced surgery on the right side.  Her right mastectomy scar was excised with a 10 blade.  The subcutaneous tissue was dissected down to the level of the implant with electrocautery.  The implant was removed.  The lateral gutter and inferior gutter underwent pop [place].  The superior flap also underwent pop [place].  A 800 mL sizer was placed in the breast pocket via a Simons funnel.  The AlloDerm and skin were then temporary closed with Vicryl suture and staples respectively.  My attention then turned to the left side.  The mastectomy scar was excised with a 10 blade.  The subcutaneous tissue was dissected down to the level of the implant with electrocautery.  The implant was removed.  The lateral gutter underwent pop [place].  The superior flap also underwent pop [place].  A 800 mL sizer was placed in the breast pocket via a Simons funnel.  The AlloDerm and skin were then temporary closed with Vicryl suture and staples respectively.  The patient was sat upright to check for symmetry.  Symmetry was confirmed.  The patient was sat back down supine on the operating table.  The temporary closure and size were taken  down and removed respectively on the right side.  The pocket was irrigated with antibiotic and then a Betadine solution.  The AlloDerm was tacked with 3-0 Vicryl pop-off sutures.  My gloves were changed.  The planned implant was then inserted in the breast pocket be a Simons funnel.  The Vicryl ties then tied down.  The skin was then temporary closed with staples.  The temporary closure and size were taken down and removed respectively on the left side.  The pocket was irrigated with antibiotic and then a Betadine solution.  The AlloDerm was tacked with 3-0 Vicryl pop-off sutures.  My gloves were changed.  The planned implant was then inserted in the breast pocket be a Simons funnel.  The Vicryl ties then tied down.  The skin was then temporary closed with staples.  Next, the patient was again sat upright.  Excess skin along the entirety of the mastectomy incision was tailor tacked.  The staples were marked with a marking pen.  The patient was sat back down supine on the operating table.  The staples were removed.  The marks were then made in continuity with one another.  The areas were infiltrated with 1% lidocaine with 1-100,000 epinephrine.  A 10 blade was used to incise the skin.  Electrocautery was then used to excise the subcutaneous tissue.  The skin was then temporary closed with staples.  My attention then turned to the left side.  The staples were removed.  The marks were then made in continuity with one another.  The areas were infiltrated with 1% lidocaine with 1-100,000 epinephrine.  A 10 blade was used to incise the skin.  Electrocautery was then used to excise the subcutaneous tissue.  The skin was then temporary closed with staples. Simultaneously, the skin was closed with 3-0 Monocryl suture in a deep dermal buried interrupted fashion and 3-0 Stratafix suture in an intracuticular fashion.  Total length of the complex closure measured 56 cm.  Pernio tissue glue was applied.  Patient's chest wall was  dressed with a surgical bra and Kerlix fluffs.  Case then turned over to anesthesia which point patient was awoken from general anesthesia successfully and taken to PACU in stable condition.  I was present for the entire procedure.  All counts were correct.    Estimated blood loss: Minimal    Drains: None    Complications: None immediate

## 2020-09-02 NOTE — ANESTHESIA PREPROCEDURE EVALUATION
Anesthesia Evaluation     Patient summary reviewed and Nursing notes reviewed   NPO Solid Status: > 8 hours  NPO Liquid Status: > 2 hours           Airway   Mallampati: II  TM distance: >3 FB  Neck ROM: full  No difficulty expected  Dental      Pulmonary    (+) sleep apnea on CPAP,   (-) COPD, asthma, shortness of breath, not a smoker  Cardiovascular     Patient on routine beta blocker    (+) PVD,   (-) hypertension, past MI, dysrhythmias (rx w B Blockers ), angina, hyperlipidemia      Neuro/Psych  (+) headaches, psychiatric history (effexor ),     (-) seizures, CVA  GI/Hepatic/Renal/Endo    (+)   thyroid problem hypothyroidism    Musculoskeletal     Abdominal    Substance History      OB/GYN          Other      history of cancer (breast )        Phys Exam Other: Goodman 2  Grade IIa 2019 [employer]ig  Temporary frontal bridge               Anesthesia Plan    ASA 2     general   (Propofol infusion as part of anti PONV tech)  intravenous induction     Anesthetic plan, all risks, benefits, and alternatives have been provided, discussed and informed consent has been obtained with: patient.    Plan discussed with CRNA.

## 2020-09-02 NOTE — ANESTHESIA POSTPROCEDURE EVALUATION
Patient: Mary Ambrosio    Procedure Summary     Date:  09/02/20 Room / Location:   [facility]    Anesthesia Start:  0728 Anesthesia Stop:  1005    Procedure:  BREAST IMPLANT EXCHANGE WITH POCKET WORK AND COMPLEX CLOSURE BILATERAL (Bilateral Breast) Diagnosis:       Acquired absence of breast and absent nipple, bilateral      Breast asymmetry      Personal history of breast cancer      Disorder of breast implant    Surgeon:  Rogelio Dumont MD Provider:  Mikel Winter MD    Anesthesia Type:  general ASA Status:  2          Anesthesia Type: general    Vitals  Vitals Value Taken Time   /55 9/2/2020 10:00 AM   Temp 98.2 °F (36.8 °C) 9/2/2020 10:00 AM   Pulse 88 9/2/2020 10:06 AM   Resp 18 9/2/2020 10:00 AM   SpO2 100 % 9/2/2020 10:06 AM   Vitals shown include unvalidated device data.        Post Anesthesia Care and Evaluation    Patient location during evaluation: PACU  Patient participation: complete - patient participated  Level of consciousness: awake and alert  Pain score: 0  Pain management: adequate  Airway patency: patent  Anesthetic complications: No anesthetic complications  PONV Status: none  Cardiovascular status: hemodynamically stable and acceptable  Respiratory status: nonlabored ventilation, acceptable, face mask and spontaneous ventilation (Duoneb administered upon arrival to PACU.  SpO2 from 93% to 100%. )  Hydration status: acceptable

## 2020-09-16 ENCOUNTER — CLINICAL SUPPORT (OUTPATIENT)
Dept: ONCOLOGY | Facility: CLINIC | Age: 45
End: 2020-09-16

## 2020-09-16 VITALS
BODY MASS INDEX: 34.72 KG/M2 | DIASTOLIC BLOOD PRESSURE: 55 MMHG | SYSTOLIC BLOOD PRESSURE: 105 MMHG | HEIGHT: 66 IN | OXYGEN SATURATION: 97 % | RESPIRATION RATE: 16 BRPM | WEIGHT: 216 LBS | HEART RATE: 66 BPM | TEMPERATURE: 98 F

## 2020-09-16 DIAGNOSIS — C50.112 MALIGNANT NEOPLASM OF CENTRAL PORTION OF LEFT FEMALE BREAST, UNSPECIFIED ESTROGEN RECEPTOR STATUS (HCC): Primary | ICD-10-CM

## 2020-09-16 PROCEDURE — 99213 OFFICE O/P EST LOW 20 MIN: CPT | Performed by: NURSE PRACTITIONER

## 2020-09-16 NOTE — PROGRESS NOTES
PROBLEM LIST:  1. [id number] invasive ductal carcinoma of the left breast, ER positive AK positive HER-2 negative  A) the patient presented with skin dimpling under the left breast. A needle localized biopsy was done on 2/8/2017. Pathology showed a 2.2 cm invasive ductal carcinoma. There were focally positive margins.   Bilateral mastectomy done on 4/12/17.  Pathology showed residual in situ DICIS.  0/1 SLN involved.  [id number] (Stage IIA).  Oncotype score 34 in the high risk group.  Adjuvant chemotherapy with taxotere and cyclophophamide started 5/9/17.  Long delay between cycle 1 and cycle 2 due to mastectomy wound infection.  Severe allergic reaction after cycle 3 with hives, tightness in throat, and swelling of face that lasted for over 2 weeks despite high dose steroids.  CMF given for cycle #4.  B) tamoxifen started October 2017. Enrolled in  of everolimus versus placebo.  Treatment stopped due to grade 4 hypertriglyceridemia.    Subjective     HISTORY OF PRESENT ILLNESS:   Mary Amrbosio returns for follow-up. She continues on tamoxifen and continues to have frequent hot flashes. Her neurologist increased the Effexor to 150 mg po daily about 3 months ago and she has not had any further migraines. She had bilateral implant exchange on 9/2/2020. She is doing well after surgery. She denies any new complaints.       Past Medical History, Past Surgical History, Social History, Family History have been reviewed and are without significant changes except as mentioned.    Review of Systems   A comprehensive 14 point review of systems was performed and was negative except as mentioned.    Medications:  The current medication list was reviewed in the EMR    ALLERGIES:    Allergies   Allergen Reactions   • Taxotere [Docetaxel] Hives and Shortness Of Breath   • Tegaderm Ag Mesh [Silver] Rash     Prolonged exposure        Objective      /55   Pulse 66   Temp 98 °F (36.7 °C)   Resp 16   Ht 167.6 cm  "(66\")   Wt 98 kg (216 lb)   SpO2 97%   BMI 34.86 kg/m²    Vitals:    09/16/20 1447   PainSc: 0-No pain             Performance Status: 0    General: well appearing female in no acute distress  Neuro: alert and oriented  HEENT: sclera anicteric, oropharynx clear  Lymphatics: no supraclavicular, or axillary adenopathy.   Chest: Status post bilateral mastectomy with implant reconstruction  cardiovascular: regular rate and rhythm, no murmurs  Lungs: clear to auscultation bilaterally  Abdomen: soft, nontender, nondistended.  No palpable organomegaly  Extremeties: no lower extremity edema.    Skin: No rashes, lesions, or bruising  Psych: Mood and affect appropriate    Lab Results   Component Value Date    WBC 9.45 08/31/2020    HGB 14.3 08/31/2020    HCT 45.0 08/31/2020    MCV 87.7 08/31/2020     08/31/2020     Lab Results   Component Value Date    GLUCOSE 82 08/31/2020    BUN 10 08/31/2020    CREATININE 0.97 08/31/2020    EGFRIFNONA 62 08/31/2020    BCR 10.3 08/31/2020    K 4.4 08/31/2020    CO2 23.0 08/31/2020    CALCIUM 9.7 08/31/2020    ALBUMIN 4.30 03/12/2020    AST 25 03/12/2020    ALT 21 03/12/2020         Assessment/Plan   Mary Ambrosio is a 45 y.o. year old female with stage II a ER positive NC positive HER-2 negative invasive ductal carcinoma of the left breast who returns for follow up on tamoxifen.  She is doing well with no evidence of disease recurrence. Effexor is helping some with hot flashes.       Follow up in 6 months with labs on return.      I spent 15 minutes with the patient. I spent > 50% percent of this time counseling and discussing prognosis, diagnostic testing, evaluation, current status and management.        Eulalia Mcdowell, SANDRA  [facility]    10/02/2018          CC:          "

## 2020-11-30 RX ORDER — TAMOXIFEN CITRATE 20 MG/1
TABLET ORAL
Qty: 30 TABLET | Refills: 11 | Status: SHIPPED | OUTPATIENT
Start: 2020-11-30 | End: 2021-09-23 | Stop reason: SDUPTHER

## 2021-03-17 ENCOUNTER — OFFICE VISIT (OUTPATIENT)
Dept: ONCOLOGY | Facility: CLINIC | Age: 46
End: 2021-03-17

## 2021-03-17 ENCOUNTER — LAB (OUTPATIENT)
Dept: LAB | Facility: HOSPITAL | Age: 46
End: 2021-03-17

## 2021-03-17 ENCOUNTER — RESEARCH ENCOUNTER (OUTPATIENT)
Dept: OTHER | Facility: OTHER | Age: 46
End: 2021-03-17

## 2021-03-17 VITALS
WEIGHT: 217 LBS | TEMPERATURE: 97.8 F | OXYGEN SATURATION: 95 % | SYSTOLIC BLOOD PRESSURE: 105 MMHG | BODY MASS INDEX: 34.87 KG/M2 | HEART RATE: 76 BPM | RESPIRATION RATE: 18 BRPM | DIASTOLIC BLOOD PRESSURE: 47 MMHG | HEIGHT: 66 IN

## 2021-03-17 DIAGNOSIS — C50.112 MALIGNANT NEOPLASM OF CENTRAL PORTION OF LEFT FEMALE BREAST, UNSPECIFIED ESTROGEN RECEPTOR STATUS (HCC): ICD-10-CM

## 2021-03-17 DIAGNOSIS — C50.112 MALIGNANT NEOPLASM OF CENTRAL PORTION OF LEFT FEMALE BREAST, UNSPECIFIED ESTROGEN RECEPTOR STATUS (HCC): Primary | ICD-10-CM

## 2021-03-17 LAB
ALBUMIN SERPL-MCNC: 4.2 G/DL (ref 3.5–5.2)
ALBUMIN/GLOB SERPL: 1.2 G/DL
ALP SERPL-CCNC: 94 U/L (ref 39–117)
ALT SERPL W P-5'-P-CCNC: 26 U/L (ref 1–33)
ANION GAP SERPL CALCULATED.3IONS-SCNC: 10 MMOL/L (ref 5–15)
AST SERPL-CCNC: 28 U/L (ref 1–32)
BILIRUB SERPL-MCNC: 0.2 MG/DL (ref 0–1.2)
BUN SERPL-MCNC: 12 MG/DL (ref 6–20)
BUN/CREAT SERPL: 12.6 (ref 7–25)
CALCIUM SPEC-SCNC: 9.4 MG/DL (ref 8.6–10.5)
CHLORIDE SERPL-SCNC: 104 MMOL/L (ref 98–107)
CO2 SERPL-SCNC: 23 MMOL/L (ref 22–29)
CREAT SERPL-MCNC: 0.95 MG/DL (ref 0.57–1)
ERYTHROCYTE [DISTWIDTH] IN BLOOD BY AUTOMATED COUNT: 13.1 % (ref 12.3–15.4)
GFR SERPL CREATININE-BSD FRML MDRD: 64 ML/MIN/1.73
GLOBULIN UR ELPH-MCNC: 3.4 GM/DL
GLUCOSE SERPL-MCNC: 129 MG/DL (ref 65–99)
HCT VFR BLD AUTO: 42.5 % (ref 34–46.6)
HGB BLD-MCNC: 13.8 G/DL (ref 12–15.9)
LYMPHOCYTES # BLD AUTO: 3.2 10*3/MM3 (ref 0.7–3.1)
LYMPHOCYTES NFR BLD AUTO: 34 % (ref 19.6–45.3)
MCH RBC QN AUTO: 27.1 PG (ref 26.6–33)
MCHC RBC AUTO-ENTMCNC: 32.4 G/DL (ref 31.5–35.7)
MCV RBC AUTO: 83.8 FL (ref 79–97)
MONOCYTES # BLD AUTO: 0.3 10*3/MM3 (ref 0.1–0.9)
MONOCYTES NFR BLD AUTO: 2.9 % (ref 5–12)
NEUTROPHILS NFR BLD AUTO: 5.9 10*3/MM3 (ref 1.7–7)
NEUTROPHILS NFR BLD AUTO: 63.1 % (ref 42.7–76)
PLATELET # BLD AUTO: 312 10*3/MM3 (ref 140–450)
PMV BLD AUTO: 7.8 FL (ref 6–12)
POTASSIUM SERPL-SCNC: 4.1 MMOL/L (ref 3.5–5.2)
PROT SERPL-MCNC: 7.6 G/DL (ref 6–8.5)
RBC # BLD AUTO: 5.07 10*6/MM3 (ref 3.77–5.28)
SODIUM SERPL-SCNC: 137 MMOL/L (ref 136–145)
WBC # BLD AUTO: 9.3 10*3/MM3 (ref 3.4–10.8)

## 2021-03-17 PROCEDURE — 85025 COMPLETE CBC W/AUTO DIFF WBC: CPT

## 2021-03-17 PROCEDURE — 36415 COLL VENOUS BLD VENIPUNCTURE: CPT

## 2021-03-17 PROCEDURE — 99214 OFFICE O/P EST MOD 30 MIN: CPT | Performed by: INTERNAL MEDICINE

## 2021-03-17 PROCEDURE — 80053 COMPREHEN METABOLIC PANEL: CPT

## 2021-03-17 RX ORDER — VENLAFAXINE HYDROCHLORIDE 150 MG/1
150 CAPSULE, EXTENDED RELEASE ORAL DAILY
Qty: 90 CAPSULE | Refills: 3 | Status: SHIPPED | OUTPATIENT
Start: 2021-03-17 | End: 2022-03-21

## 2021-03-17 RX ORDER — CLOBETASOL PROPIONATE 0.05 G/100ML
SHAMPOO TOPICAL
COMMUNITY
Start: 2021-03-09 | End: 2022-09-29

## 2021-03-17 NOTE — RESEARCH
What side effects or risks can I expect from being in the study?  If you choose to take part in this study, there is a risk that:  You may lose time at work or home and spend more time in the hospital or doctor’s  office than usual  You may be asked sensitive or private questions which you normally do not discuss  The everolimus used in this study may affect how different parts of your body work such as  your liver, kidneys, heart and blood. The study doctor will be testing your blood and will  let you know if changes occur that may affect your health.  There is also a risk that you could have side effects from the study drug(s)/study approach.  Here are important points about side effects:  The study doctors do not know wo will or will not have side effects.  Some side effects may go away soon, some may last a long time, or some may never  go away.  Some side effects may interfere with your ability to have children.  Some side effects may be serious and may even result in death.  Here are important points about how you and the study doctor can make side effects less of  a problem:  Tell the study doctor if you notice or feel anything different so they can see if you  are having a side effect.  The study doctor may be able to treat some side effects.  The study doctor may adjust the study drugs to try to reduce side effects.  The tables below show the most common and the most serious side effects that researchers know about. There might be other side effects that researchers do not yet know about.  If important new side effects are found, the study doctor will discuss these with you.     The patient v/u and had no further questions.     Corine Rose RN

## 2021-03-17 NOTE — PROGRESS NOTES
"      PROBLEM LIST:  1. [id number] invasive ductal carcinoma of the left breast, ER positive IN positive HER-2 negative  A) the patient presented with skin dimpling under the left breast. A needle localized biopsy was done on 2/8/2017. Pathology showed a 2.2 cm invasive ductal carcinoma. There were focally positive margins.   Bilateral mastectomy done on 4/12/17.  Pathology showed residual in situ DICIS.  0/1 SLN involved.  [id number] (Stage IIA).  Oncotype score 34 in the high risk group.  Adjuvant chemotherapy with taxotere and cyclophophamide started 5/9/17.  Long delay between cycle 1 and cycle 2 due to mastectomy wound infection.  Severe allergic reaction after cycle 3 with hives, tightness in throat, and swelling of face that lasted for over 2 weeks despite high dose steroids.  CMF given for cycle #4.  B) tamoxifen started October 2017. Enrolled in  of everolimus versus placebo.  Treatment stopped due to grade 4 hypertriglyceridemia.    Subjective     HISTORY OF PRESENT ILLNESS:   Mary Ambrosio returns for follow-up.  She continues to take tamoxifen.  Since her last visit she had an MRI that raise some concern for a pituitary nodule, but it was subsequently determined that this was a artifact.  She saw a neurologist at some point during this and they recommended increasing her Effexor dose to 150 mg.  She says her hot flashes do seem better on the higher dose.  Otherwise she is doing well with no new complaints.    Objective      /47   Pulse 76   Temp 97.8 °F (36.6 °C)   Resp 18   Ht 167.6 cm (66\")   Wt 98.4 kg (217 lb)   SpO2 95%   BMI 35.02 kg/m²    Vitals:    03/17/21 1101   PainSc: 0-No pain             Performance Status: 0    General: well appearing female in no acute distress  Neuro: alert and oriented  HEENT: sclera anicteric, oropharynx clear  Lymphatics: no supraclavicular, or axillary adenopathy.   Chest: Status post bilateral mastectomy with implant " reconstruction  cardiovascular: regular rate and rhythm, no murmurs  Lungs: clear to auscultation bilaterally  Abdomen: soft, nontender, nondistended.  No palpable organomegaly  Extremeties: no lower extremity edema.    Skin: No rashes, lesions, or bruising  Psych: Mood and affect appropriate    Lab Results   Component Value Date    WBC 9.30 03/17/2021    HGB 13.8 03/17/2021    HCT 42.5 03/17/2021    MCV 83.8 03/17/2021     03/17/2021     Lab Results   Component Value Date    GLUCOSE 129 (H) 03/17/2021    BUN 12 03/17/2021    CREATININE 0.95 03/17/2021    EGFRIFNONA 64 03/17/2021    BCR 12.6 03/17/2021    K 4.1 03/17/2021    CO2 23.0 03/17/2021    CALCIUM 9.4 03/17/2021    ALBUMIN 4.20 03/17/2021    AST 28 03/17/2021    ALT 26 03/17/2021         Assessment/Plan   Mary Ambrosio is a 45 y.o. year old female with stage II a ER positive MO positive HER-2 negative invasive ductal carcinoma of the left breast who returns for follow up on tamoxifen.  We will continue for a minimum of 5 years.  She is doing well with no evidence of disease recurrence.    Hot flashes: Continue Effexor 150 mg.      Follow up in 6 months with labs on return.            Nancy Caruso MD  [facility] Hematology and Oncology    10/02/2018          CC:

## 2021-03-18 ENCOUNTER — HOSPITAL ENCOUNTER (OUTPATIENT)
Age: 46
End: 2021-03-18
Payer: COMMERCIAL

## 2021-03-18 DIAGNOSIS — E03.9: Primary | ICD-10-CM

## 2021-03-18 DIAGNOSIS — E53.8: ICD-10-CM

## 2021-03-18 LAB
T4 FREE SERPL-MCNC: 0.91 NG/DL (ref 0.78–2.19)
TSH SERPL-ACNC: 1.96 UIU/ML (ref 0.47–4.68)
VITAMIN B12: > 1000 PG/ML (ref 239–931)

## 2021-03-18 PROCEDURE — 36415 COLL VENOUS BLD VENIPUNCTURE: CPT

## 2021-03-18 PROCEDURE — 84439 ASSAY OF FREE THYROXINE: CPT

## 2021-03-18 PROCEDURE — 82607 VITAMIN B-12: CPT

## 2021-03-18 PROCEDURE — 84443 ASSAY THYROID STIM HORMONE: CPT

## 2021-04-09 ENCOUNTER — HOSPITAL ENCOUNTER (OUTPATIENT)
Age: 46
End: 2021-04-09
Payer: COMMERCIAL

## 2021-04-09 DIAGNOSIS — Z20.822: Primary | ICD-10-CM

## 2021-04-09 LAB
HCT VFR BLD CALC: 42 % (ref 37–47)
HGB BLD-MCNC: 13.4 G/DL (ref 12.2–16.2)
MCHC RBC-ENTMCNC: 31.8 G/DL (ref 31.8–35.4)
MCV RBC: 85.6 FL (ref 81–99)
MEAN CORPUSCULAR HEMOGLOBIN: 27.2 PG (ref 27–31.2)
PLATELET # BLD: 382 K/MM3 (ref 142–424)
RBC # BLD AUTO: 4.91 M/MM3 (ref 4.2–5.4)
WBC # BLD AUTO: 10.3 K/MM3 (ref 4.8–10.8)

## 2021-04-09 PROCEDURE — 71045 X-RAY EXAM CHEST 1 VIEW: CPT

## 2021-04-09 PROCEDURE — 85025 COMPLETE CBC W/AUTO DIFF WBC: CPT

## 2021-04-09 PROCEDURE — 36415 COLL VENOUS BLD VENIPUNCTURE: CPT

## 2021-04-09 PROCEDURE — U0003 INFECTIOUS AGENT DETECTION BY NUCLEIC ACID (DNA OR RNA); SEVERE ACUTE RESPIRATORY SYNDROME CORONAVIRUS 2 (SARS-COV-2) (CORONAVIRUS DISEASE [COVID-19]), AMPLIFIED PROBE TECHNIQUE, MAKING USE OF HIGH THROUGHPUT TECHNOLOGIES AS DESCRIBED BY CMS-2020-01-R: HCPCS

## 2021-09-03 ENCOUNTER — HOSPITAL ENCOUNTER (OUTPATIENT)
Age: 46
End: 2021-09-03
Payer: COMMERCIAL

## 2021-09-03 DIAGNOSIS — R10.9: Primary | ICD-10-CM

## 2021-09-03 PROCEDURE — 74176 CT ABD & PELVIS W/O CONTRAST: CPT

## 2021-09-23 ENCOUNTER — LAB (OUTPATIENT)
Dept: LAB | Facility: HOSPITAL | Age: 46
End: 2021-09-23

## 2021-09-23 ENCOUNTER — OFFICE VISIT (OUTPATIENT)
Dept: ONCOLOGY | Facility: CLINIC | Age: 46
End: 2021-09-23

## 2021-09-23 VITALS
OXYGEN SATURATION: 97 % | HEART RATE: 67 BPM | DIASTOLIC BLOOD PRESSURE: 59 MMHG | SYSTOLIC BLOOD PRESSURE: 120 MMHG | BODY MASS INDEX: 34.46 KG/M2 | HEIGHT: 66 IN | TEMPERATURE: 97.1 F | WEIGHT: 214.4 LBS

## 2021-09-23 DIAGNOSIS — C50.112 MALIGNANT NEOPLASM OF CENTRAL PORTION OF LEFT FEMALE BREAST, UNSPECIFIED ESTROGEN RECEPTOR STATUS (HCC): Primary | ICD-10-CM

## 2021-09-23 DIAGNOSIS — C50.112 MALIGNANT NEOPLASM OF CENTRAL PORTION OF LEFT FEMALE BREAST, UNSPECIFIED ESTROGEN RECEPTOR STATUS (HCC): ICD-10-CM

## 2021-09-23 LAB
ALBUMIN SERPL-MCNC: 4.1 G/DL (ref 3.5–5.2)
ALBUMIN/GLOB SERPL: 1.2 G/DL
ALP SERPL-CCNC: 83 U/L (ref 39–117)
ALT SERPL W P-5'-P-CCNC: 36 U/L (ref 1–33)
ANION GAP SERPL CALCULATED.3IONS-SCNC: 17 MMOL/L (ref 5–15)
AST SERPL-CCNC: 44 U/L (ref 1–32)
BILIRUB SERPL-MCNC: 0.3 MG/DL (ref 0–1.2)
BUN SERPL-MCNC: 16 MG/DL (ref 6–20)
BUN/CREAT SERPL: 17 (ref 7–25)
CALCIUM SPEC-SCNC: 9.4 MG/DL (ref 8.6–10.5)
CHLORIDE SERPL-SCNC: 106 MMOL/L (ref 98–107)
CO2 SERPL-SCNC: 16 MMOL/L (ref 22–29)
CREAT SERPL-MCNC: 0.94 MG/DL (ref 0.57–1)
ERYTHROCYTE [DISTWIDTH] IN BLOOD BY AUTOMATED COUNT: 13.6 % (ref 12.3–15.4)
GFR SERPL CREATININE-BSD FRML MDRD: 64 ML/MIN/1.73
GLOBULIN UR ELPH-MCNC: 3.5 GM/DL
GLUCOSE SERPL-MCNC: 73 MG/DL (ref 65–99)
HCT VFR BLD AUTO: 43.3 % (ref 34–46.6)
HGB BLD-MCNC: 14 G/DL (ref 12–15.9)
LYMPHOCYTES # BLD AUTO: 3.3 10*3/MM3 (ref 0.7–3.1)
LYMPHOCYTES NFR BLD AUTO: 32.1 % (ref 19.6–45.3)
MCH RBC QN AUTO: 28.1 PG (ref 26.6–33)
MCHC RBC AUTO-ENTMCNC: 32.4 G/DL (ref 31.5–35.7)
MCV RBC AUTO: 86.8 FL (ref 79–97)
MONOCYTES # BLD AUTO: 0.4 10*3/MM3 (ref 0.1–0.9)
MONOCYTES NFR BLD AUTO: 4.1 % (ref 5–12)
NEUTROPHILS NFR BLD AUTO: 6.6 10*3/MM3 (ref 1.7–7)
NEUTROPHILS NFR BLD AUTO: 63.8 % (ref 42.7–76)
PLATELET # BLD AUTO: 281 10*3/MM3 (ref 140–450)
PMV BLD AUTO: 8.2 FL (ref 6–12)
POTASSIUM SERPL-SCNC: 5.3 MMOL/L (ref 3.5–5.2)
PROT SERPL-MCNC: 7.6 G/DL (ref 6–8.5)
RBC # BLD AUTO: 4.98 10*6/MM3 (ref 3.77–5.28)
SODIUM SERPL-SCNC: 139 MMOL/L (ref 136–145)
WBC # BLD AUTO: 10.3 10*3/MM3 (ref 3.4–10.8)

## 2021-09-23 PROCEDURE — 80053 COMPREHEN METABOLIC PANEL: CPT

## 2021-09-23 PROCEDURE — 85025 COMPLETE CBC W/AUTO DIFF WBC: CPT

## 2021-09-23 PROCEDURE — 99214 OFFICE O/P EST MOD 30 MIN: CPT | Performed by: NURSE PRACTITIONER

## 2021-09-23 PROCEDURE — 36415 COLL VENOUS BLD VENIPUNCTURE: CPT

## 2021-09-23 RX ORDER — METOPROLOL SUCCINATE 50 MG/1
50 TABLET, EXTENDED RELEASE ORAL DAILY
COMMUNITY
Start: 2021-08-27 | End: 2022-09-29

## 2021-09-23 RX ORDER — TAMOXIFEN CITRATE 20 MG/1
20 TABLET ORAL DAILY
Qty: 90 TABLET | Refills: 3 | Status: SHIPPED | OUTPATIENT
Start: 2021-09-23 | End: 2022-09-29

## 2021-09-23 NOTE — PROGRESS NOTES
"      PROBLEM LIST:  1. [id number] invasive ductal carcinoma of the left breast, ER positive LA positive HER-2 negative  A) the patient presented with skin dimpling under the left breast. A needle localized biopsy was done on 2/8/2017. Pathology showed a 2.2 cm invasive ductal carcinoma. There were focally positive margins.   Bilateral mastectomy done on 4/12/17.  Pathology showed residual in situ DICIS.  0/1 SLN involved.  [id number] (Stage IIA).  Oncotype score 34 in the high risk group.  Adjuvant chemotherapy with taxotere and cyclophophamide started 5/9/17.  Long delay between cycle 1 and cycle 2 due to mastectomy wound infection.  Severe allergic reaction after cycle 3 with hives, tightness in throat, and swelling of face that lasted for over 2 weeks despite high dose steroids.  CMF given for cycle #4.  B) tamoxifen started October 2017. Enrolled in  of everolimus versus placebo.  Treatment stopped due to grade 4 hypertriglyceridemia.    Subjective   Chief complaint: Follow-up breast cancer      HISTORY OF PRESENT ILLNESS:   Mary Ambrosio returns for follow-up.  She continues to take tamoxifen.  She has frequent hot flashes and night sweats.  She continues on Effexor at 150 mg daily.  She thinks this does help with the hot flashes.  She also has generalized arthritic pain particularly in her knees and hips.  She denies any long bone pain.  She had Covid the first week of August.  She has a lingering cough that is getting better.  She denies any dyspnea.  She has migraines that are controlled.  She denies any diplopia.        Objective      /59   Pulse 67   Temp 97.1 °F (36.2 °C)   Ht 167.6 cm (66\")   Wt 97.3 kg (214 lb 6.4 oz)   SpO2 97%   BMI 34.61 kg/m²    There were no vitals filed for this visit.          Performance Status: 0    General: well appearing female in no acute distress  Neuro: alert and oriented  HEENT: sclera anicteric, oropharynx clear  Lymphatics: no supraclavicular, or " axillary adenopathy.   Chest: Status post bilateral mastectomy with implant reconstruction.  No masses or skin changes.  cardiovascular: regular rate and rhythm, no murmurs  Lungs: clear to auscultation bilaterally  Abdomen: soft, nontender, nondistended.  No palpable organomegaly  Extremeties: no lower extremity edema.    Skin: No rashes, lesions, or bruising  Psych: Mood and affect appropriate    Lab Results   Component Value Date    WBC 10.30 09/23/2021    HGB 14.0 09/23/2021    HCT 43.3 09/23/2021    MCV 86.8 09/23/2021     09/23/2021     Lab Results   Component Value Date    GLUCOSE 129 (H) 03/17/2021    BUN 12 03/17/2021    CREATININE 0.95 03/17/2021    EGFRIFNONA 64 03/17/2021    BCR 12.6 03/17/2021    K 4.1 03/17/2021    CO2 23.0 03/17/2021    CALCIUM 9.4 03/17/2021    ALBUMIN 4.20 03/17/2021    AST 28 03/17/2021    ALT 26 03/17/2021         Assessment/Plan   Mary Ambrosio is a 46 y.o. year old female with stage II a ER positive IA positive HER-2 negative invasive ductal carcinoma of the left breast who returns for follow up on tamoxifen.  We will continue for a minimum of 5 years.  She is doing well with no evidence of disease recurrence.  Refill for tamoxifen sent to pharmacy.    Hot flashes: Continue Effexor 150 mg.      Follow up in 6 months with labs on return.        I spent 33 minutes caring for Mary on this date of service. This time includes time spent by me in the following activities: preparing for the visit, reviewing tests, obtaining and/or reviewing a separately obtained history, performing a medically appropriate examination and/or evaluation, counseling and educating the patient/family/caregiver, ordering medications, tests, or procedures and documenting information in the medical record      Eulalia Mcdowell APRN  [facility] Hematology and Oncology    09/23/2021            CC:

## 2021-12-08 ENCOUNTER — HOSPITAL ENCOUNTER (OUTPATIENT)
Age: 46
End: 2021-12-08
Payer: COMMERCIAL

## 2021-12-08 DIAGNOSIS — J40: Primary | ICD-10-CM

## 2021-12-08 LAB
HCT VFR BLD CALC: 39.6 % (ref 37–47)
HGB BLD-MCNC: 13.5 G/DL (ref 12.2–16.2)
MCHC RBC-ENTMCNC: 34 G/DL (ref 31.8–35.4)
MCV RBC: 84.4 FL (ref 81–99)
MEAN CORPUSCULAR HEMOGLOBIN: 28.7 PG (ref 27–31.2)
PLATELET # BLD: 387 K/MM3 (ref 142–424)
RBC # BLD AUTO: 4.69 M/MM3 (ref 4.2–5.4)
WBC # BLD AUTO: 12.5 K/MM3 (ref 4.8–10.8)

## 2021-12-08 PROCEDURE — 71101 X-RAY EXAM UNILAT RIBS/CHEST: CPT

## 2021-12-08 PROCEDURE — 36415 COLL VENOUS BLD VENIPUNCTURE: CPT

## 2021-12-08 PROCEDURE — 85025 COMPLETE CBC W/AUTO DIFF WBC: CPT

## 2021-12-08 PROCEDURE — 71046 X-RAY EXAM CHEST 2 VIEWS: CPT

## 2021-12-23 RX ORDER — TAMOXIFEN CITRATE 20 MG/1
TABLET ORAL
Refills: 0 | OUTPATIENT
Start: 2021-12-23

## 2021-12-27 ENCOUNTER — HOSPITAL ENCOUNTER (OUTPATIENT)
Dept: HOSPITAL 22 - RAD | Age: 46
End: 2021-12-27
Payer: COMMERCIAL

## 2021-12-27 DIAGNOSIS — J18.9: Primary | ICD-10-CM

## 2021-12-27 DIAGNOSIS — S22.31XD: ICD-10-CM

## 2021-12-27 PROCEDURE — 71046 X-RAY EXAM CHEST 2 VIEWS: CPT

## 2021-12-27 PROCEDURE — 71100 X-RAY EXAM RIBS UNI 2 VIEWS: CPT

## 2022-01-04 ENCOUNTER — HOSPITAL ENCOUNTER (OUTPATIENT)
Dept: HOSPITAL 22 - RAD | Age: 47
End: 2022-01-04
Payer: COMMERCIAL

## 2022-01-04 DIAGNOSIS — R93.89: Primary | ICD-10-CM

## 2022-01-04 PROCEDURE — 71260 CT THORAX DX C+: CPT

## 2022-03-21 DIAGNOSIS — C50.112 MALIGNANT NEOPLASM OF CENTRAL PORTION OF LEFT FEMALE BREAST, UNSPECIFIED ESTROGEN RECEPTOR STATUS: Primary | ICD-10-CM

## 2022-03-21 RX ORDER — VENLAFAXINE HYDROCHLORIDE 150 MG/1
CAPSULE, EXTENDED RELEASE ORAL
Qty: 90 CAPSULE | Refills: 3 | Status: SHIPPED | OUTPATIENT
Start: 2022-03-21 | End: 2023-03-09

## 2022-03-24 ENCOUNTER — LAB (OUTPATIENT)
Dept: LAB | Facility: HOSPITAL | Age: 47
End: 2022-03-24

## 2022-03-24 ENCOUNTER — OFFICE VISIT (OUTPATIENT)
Dept: ONCOLOGY | Facility: CLINIC | Age: 47
End: 2022-03-24

## 2022-03-24 VITALS
RESPIRATION RATE: 18 BRPM | SYSTOLIC BLOOD PRESSURE: 113 MMHG | DIASTOLIC BLOOD PRESSURE: 53 MMHG | OXYGEN SATURATION: 97 % | TEMPERATURE: 97.5 F | HEART RATE: 51 BPM | WEIGHT: 207.6 LBS | HEIGHT: 66 IN | BODY MASS INDEX: 33.37 KG/M2

## 2022-03-24 DIAGNOSIS — C50.112 MALIGNANT NEOPLASM OF CENTRAL PORTION OF LEFT FEMALE BREAST, UNSPECIFIED ESTROGEN RECEPTOR STATUS: Primary | ICD-10-CM

## 2022-03-24 DIAGNOSIS — C50.112 MALIGNANT NEOPLASM OF CENTRAL PORTION OF LEFT FEMALE BREAST, UNSPECIFIED ESTROGEN RECEPTOR STATUS: ICD-10-CM

## 2022-03-24 DIAGNOSIS — M81.0 OSTEOPOROSIS, UNSPECIFIED OSTEOPOROSIS TYPE, UNSPECIFIED PATHOLOGICAL FRACTURE PRESENCE: ICD-10-CM

## 2022-03-24 LAB
ALBUMIN SERPL-MCNC: 4.2 G/DL (ref 3.5–5.2)
ALBUMIN/GLOB SERPL: 1.2 G/DL
ALP SERPL-CCNC: 82 U/L (ref 39–117)
ALT SERPL W P-5'-P-CCNC: 25 U/L (ref 1–33)
ANION GAP SERPL CALCULATED.3IONS-SCNC: 10 MMOL/L (ref 5–15)
AST SERPL-CCNC: 28 U/L (ref 1–32)
BILIRUB SERPL-MCNC: 0.4 MG/DL (ref 0–1.2)
BUN SERPL-MCNC: 13 MG/DL (ref 6–20)
BUN/CREAT SERPL: 13.3 (ref 7–25)
CALCIUM SPEC-SCNC: 9.4 MG/DL (ref 8.6–10.5)
CHLORIDE SERPL-SCNC: 108 MMOL/L (ref 98–107)
CO2 SERPL-SCNC: 23 MMOL/L (ref 22–29)
CREAT SERPL-MCNC: 0.98 MG/DL (ref 0.57–1)
EGFRCR SERPLBLD CKD-EPI 2021: 72.7 ML/MIN/1.73
ERYTHROCYTE [DISTWIDTH] IN BLOOD BY AUTOMATED COUNT: 12.9 % (ref 12.3–15.4)
ESTRADIOL SERPL HS-MCNC: 9.1 PG/ML
FSH SERPL-ACNC: 12.2 MIU/ML
GLOBULIN UR ELPH-MCNC: 3.4 GM/DL
GLUCOSE SERPL-MCNC: 96 MG/DL (ref 65–99)
HCT VFR BLD AUTO: 43.6 % (ref 34–46.6)
HGB BLD-MCNC: 14.3 G/DL (ref 12–15.9)
LYMPHOCYTES # BLD AUTO: 2.9 10*3/MM3 (ref 0.7–3.1)
LYMPHOCYTES NFR BLD AUTO: 35.9 % (ref 19.6–45.3)
MCH RBC QN AUTO: 28.1 PG (ref 26.6–33)
MCHC RBC AUTO-ENTMCNC: 32.9 G/DL (ref 31.5–35.7)
MCV RBC AUTO: 85.5 FL (ref 79–97)
MONOCYTES # BLD AUTO: 0.4 10*3/MM3 (ref 0.1–0.9)
MONOCYTES NFR BLD AUTO: 4.5 % (ref 5–12)
NEUTROPHILS NFR BLD AUTO: 4.8 10*3/MM3 (ref 1.7–7)
NEUTROPHILS NFR BLD AUTO: 59.6 % (ref 42.7–76)
PLATELET # BLD AUTO: 306 10*3/MM3 (ref 140–450)
PMV BLD AUTO: 7.9 FL (ref 6–12)
POTASSIUM SERPL-SCNC: 4.5 MMOL/L (ref 3.5–5.2)
PROT SERPL-MCNC: 7.6 G/DL (ref 6–8.5)
RBC # BLD AUTO: 5.09 10*6/MM3 (ref 3.77–5.28)
SODIUM SERPL-SCNC: 141 MMOL/L (ref 136–145)
WBC NRBC COR # BLD: 8 10*3/MM3 (ref 3.4–10.8)

## 2022-03-24 PROCEDURE — 85025 COMPLETE CBC W/AUTO DIFF WBC: CPT

## 2022-03-24 PROCEDURE — 82670 ASSAY OF TOTAL ESTRADIOL: CPT

## 2022-03-24 PROCEDURE — 80053 COMPREHEN METABOLIC PANEL: CPT

## 2022-03-24 PROCEDURE — 36415 COLL VENOUS BLD VENIPUNCTURE: CPT

## 2022-03-24 PROCEDURE — 99214 OFFICE O/P EST MOD 30 MIN: CPT | Performed by: INTERNAL MEDICINE

## 2022-03-24 PROCEDURE — 83001 ASSAY OF GONADOTROPIN (FSH): CPT

## 2022-03-24 NOTE — PROGRESS NOTES
"      PROBLEM LIST:  1. [id number] invasive ductal carcinoma of the left breast, ER positive GA positive HER-2 negative  A) the patient presented with skin dimpling under the left breast. A needle localized biopsy was done on 2/8/2017. Pathology showed a 2.2 cm invasive ductal carcinoma. There were focally positive margins.   Bilateral mastectomy done on 4/12/17.  Pathology showed residual in situ DICIS.  0/1 SLN involved.  [id number] (Stage IIA).  Oncotype score 34 in the high risk group.  Adjuvant chemotherapy with taxotere and cyclophophamide started 5/9/17.  Long delay between cycle 1 and cycle 2 due to mastectomy wound infection.  Severe allergic reaction after cycle 3 with hives, tightness in throat, and swelling of face that lasted for over 2 weeks despite high dose steroids.  CMF given for cycle #4.  B) tamoxifen started October 2017. Enrolled in  of everolimus versus placebo.  Treatment stopped due to grade 4 hypertriglyceridemia.    Subjective   Chief complaint: Follow-up breast cancer      HISTORY OF PRESENT ILLNESS:   Mary Ambrosio returns for follow-up.  She continues to take tamoxifen.  She says she is doing okay.  She has hot flashes.  Symptoms are stable.  No new complaints or concerns.  She does mention that she cracked her rib while coughing during an upper respiratory infection, and also had another broken bone and is concerned about her bone density.        Objective      /53   Pulse 51   Temp 97.5 °F (36.4 °C) (Temporal)   Resp 18   Ht 167.6 cm (65.98\")   Wt 94.2 kg (207 lb 9.6 oz)   SpO2 97%   BMI 33.52 kg/m²    Vitals:    03/24/22 1040   PainSc: 0-No pain             Performance Status: 0    General: well appearing female in no acute distress  Neuro: alert and oriented  HEENT: sclera anicteric, oropharynx clear  Lymphatics: no supraclavicular, or axillary adenopathy.   cardiovascular: regular rate and rhythm, no murmurs  Lungs: clear to auscultation bilaterally  Abdomen: " soft, nontender, nondistended.  No palpable organomegaly  Extremeties: no lower extremity edema.    Skin: No rashes, lesions, or bruising  Psych: Mood and affect appropriate    Lab Results   Component Value Date    WBC 8.00 03/24/2022    HGB 14.3 03/24/2022    HCT 43.6 03/24/2022    MCV 85.5 03/24/2022     03/24/2022     Lab Results   Component Value Date    GLUCOSE 73 09/23/2021    BUN 16 09/23/2021    CREATININE 0.94 09/23/2021    EGFRIFNONA 64 09/23/2021    BCR 17.0 09/23/2021    K 5.3 (H) 09/23/2021    CO2 16.0 (L) 09/23/2021    CALCIUM 9.4 09/23/2021    ALBUMIN 4.10 09/23/2021    AST 44 (H) 09/23/2021    ALT 36 (H) 09/23/2021         Assessment/Plan   Mary Ambrosio is a 46 y.o. year old female with stage II a ER positive VA positive HER-2 negative invasive ductal carcinoma of the left breast who returns for follow up on tamoxifen.  We will continue for a minimum of 5 years.  Later this year she will be at the 5-year [place].  We discussed that there have been studies looking at 10 versus 5 years of tamoxifen.  I would consider her intermediate risk for recurrence.  We will send breast cancer index testing.  Estradiol and FSH levels are pending today to determine menopausal status.  She had a uterine ablation and has not had any bleeding since then.    Hot flashes: Continue Effexor 150 mg.      Follow up in 6 months with labs on return.        Nancy Caruso MD  [facility] Hematology and Oncology    09/23/2021            CC:

## 2022-04-14 ENCOUNTER — TELEPHONE (OUTPATIENT)
Dept: ONCOLOGY | Facility: CLINIC | Age: 47
End: 2022-04-14

## 2022-04-14 NOTE — TELEPHONE ENCOUNTER
Called patient re: BCI results.  This showed a 5-6% risk of late distant recurrence, and no predicted benefit of extended endocrine therapy.  Based on this, I think she can stop tamoxifen after 5  Years.  F/u as scheduled.

## 2022-06-27 ENCOUNTER — APPOINTMENT (OUTPATIENT)
Dept: BONE DENSITY | Facility: HOSPITAL | Age: 47
End: 2022-06-27

## 2022-09-29 ENCOUNTER — LAB (OUTPATIENT)
Dept: LAB | Facility: HOSPITAL | Age: 47
End: 2022-09-29

## 2022-09-29 ENCOUNTER — OFFICE VISIT (OUTPATIENT)
Dept: ONCOLOGY | Facility: CLINIC | Age: 47
End: 2022-09-29

## 2022-09-29 ENCOUNTER — APPOINTMENT (OUTPATIENT)
Dept: BONE DENSITY | Facility: HOSPITAL | Age: 47
End: 2022-09-29

## 2022-09-29 VITALS
SYSTOLIC BLOOD PRESSURE: 115 MMHG | HEART RATE: 69 BPM | TEMPERATURE: 97.8 F | DIASTOLIC BLOOD PRESSURE: 52 MMHG | BODY MASS INDEX: 32.95 KG/M2 | RESPIRATION RATE: 16 BRPM | HEIGHT: 66 IN | WEIGHT: 205 LBS | OXYGEN SATURATION: 97 %

## 2022-09-29 DIAGNOSIS — C50.112 MALIGNANT NEOPLASM OF CENTRAL PORTION OF LEFT FEMALE BREAST, UNSPECIFIED ESTROGEN RECEPTOR STATUS: ICD-10-CM

## 2022-09-29 DIAGNOSIS — C50.112 MALIGNANT NEOPLASM OF CENTRAL PORTION OF LEFT FEMALE BREAST, UNSPECIFIED ESTROGEN RECEPTOR STATUS: Primary | ICD-10-CM

## 2022-09-29 LAB
ALBUMIN SERPL-MCNC: 4.1 G/DL (ref 3.5–5.2)
ALBUMIN/GLOB SERPL: 1.2 G/DL
ALP SERPL-CCNC: 115 U/L (ref 39–117)
ALT SERPL W P-5'-P-CCNC: 29 U/L (ref 1–33)
ANION GAP SERPL CALCULATED.3IONS-SCNC: 8 MMOL/L (ref 5–15)
AST SERPL-CCNC: 28 U/L (ref 1–32)
BASOPHILS # BLD AUTO: 0.02 10*3/MM3 (ref 0–0.2)
BASOPHILS NFR BLD AUTO: 0.2 % (ref 0–1.5)
BILIRUB SERPL-MCNC: 0.4 MG/DL (ref 0–1.2)
BUN SERPL-MCNC: 10 MG/DL (ref 6–20)
BUN/CREAT SERPL: 10.3 (ref 7–25)
CALCIUM SPEC-SCNC: 9.5 MG/DL (ref 8.6–10.5)
CHLORIDE SERPL-SCNC: 102 MMOL/L (ref 98–107)
CO2 SERPL-SCNC: 26 MMOL/L (ref 22–29)
CREAT SERPL-MCNC: 0.97 MG/DL (ref 0.57–1)
DEPRECATED RDW RBC AUTO: 41.3 FL (ref 37–54)
EGFRCR SERPLBLD CKD-EPI 2021: 73.1 ML/MIN/1.73
EOSINOPHIL # BLD AUTO: 0.34 10*3/MM3 (ref 0–0.4)
EOSINOPHIL NFR BLD AUTO: 4 % (ref 0.3–6.2)
ERYTHROCYTE [DISTWIDTH] IN BLOOD BY AUTOMATED COUNT: 13.1 % (ref 12.3–15.4)
GLOBULIN UR ELPH-MCNC: 3.4 GM/DL
GLUCOSE SERPL-MCNC: 93 MG/DL (ref 65–99)
HCT VFR BLD AUTO: 45.6 % (ref 34–46.6)
HGB BLD-MCNC: 14.7 G/DL (ref 12–15.9)
IMM GRANULOCYTES # BLD AUTO: 0.02 10*3/MM3 (ref 0–0.05)
IMM GRANULOCYTES NFR BLD AUTO: 0.2 % (ref 0–0.5)
LYMPHOCYTES # BLD AUTO: 3.3 10*3/MM3 (ref 0.7–3.1)
LYMPHOCYTES NFR BLD AUTO: 39.1 % (ref 19.6–45.3)
MCH RBC QN AUTO: 27.2 PG (ref 26.6–33)
MCHC RBC AUTO-ENTMCNC: 32.2 G/DL (ref 31.5–35.7)
MCV RBC AUTO: 84.4 FL (ref 79–97)
MONOCYTES # BLD AUTO: 0.44 10*3/MM3 (ref 0.1–0.9)
MONOCYTES NFR BLD AUTO: 5.2 % (ref 5–12)
NEUTROPHILS NFR BLD AUTO: 4.31 10*3/MM3 (ref 1.7–7)
NEUTROPHILS NFR BLD AUTO: 51.3 % (ref 42.7–76)
PLATELET # BLD AUTO: 327 10*3/MM3 (ref 140–450)
PMV BLD AUTO: 9.4 FL (ref 6–12)
POTASSIUM SERPL-SCNC: 4.4 MMOL/L (ref 3.5–5.2)
PROT SERPL-MCNC: 7.5 G/DL (ref 6–8.5)
RBC # BLD AUTO: 5.4 10*6/MM3 (ref 3.77–5.28)
SODIUM SERPL-SCNC: 136 MMOL/L (ref 136–145)
WBC NRBC COR # BLD: 8.43 10*3/MM3 (ref 3.4–10.8)

## 2022-09-29 PROCEDURE — 36415 COLL VENOUS BLD VENIPUNCTURE: CPT

## 2022-09-29 PROCEDURE — 80053 COMPREHEN METABOLIC PANEL: CPT

## 2022-09-29 PROCEDURE — 85025 COMPLETE CBC W/AUTO DIFF WBC: CPT

## 2022-09-29 PROCEDURE — 99214 OFFICE O/P EST MOD 30 MIN: CPT | Performed by: INTERNAL MEDICINE

## 2022-09-29 NOTE — PROGRESS NOTES
"      PROBLEM LIST:  1. [id number] invasive ductal carcinoma of the left breast, ER positive MO positive HER-2 negative (0+)  A) the patient presented with skin dimpling under the left breast. A needle localized biopsy was done on 2/8/2017. Pathology showed a 2.2 cm invasive ductal carcinoma. There were focally positive margins.   Bilateral mastectomy done on 4/12/17.  Pathology showed residual in situ DICIS.  0/1 SLN involved.  [id number] (Stage IIA).  Oncotype score 34 in the high risk group.  Adjuvant chemotherapy with taxotere and cyclophophamide started 5/9/17.  Long delay between cycle 1 and cycle 2 due to mastectomy wound infection.  Severe allergic reaction after cycle 3 with hives, tightness in throat, and swelling of face that lasted for over 2 weeks despite high dose steroids.  CMF given for cycle #4.  B) tamoxifen started October 2017. Enrolled in  of everolimus versus placebo.  Study treatment stopped due to grade 4 hypertriglyceridemia.  Breast cancer index testing showed 5-6% risk of late recurrence, no predicted benefit of extended endocrine therapy. Patient stopped tamoxifen April 2022.    Subjective   Chief complaint: Follow-up breast cancer      HISTORY OF PRESENT ILLNESS:   Mary Ambrosio returns for follow-up.  She stopped taking tamoxifen after her last visit.  She says she really feels better.  She has better energy and just feels better overall.    She has noticed a lymph node in her neck.  It has been there for about a month and a half.  It seems to have grown slightly over that time.        Objective      /52   Pulse 69   Temp 97.8 °F (36.6 °C) (Temporal)   Resp 16   Ht 167.6 cm (65.98\")   Wt 93 kg (205 lb)   SpO2 97%   BMI 33.10 kg/m²    Vitals:    09/29/22 1315   PainSc: 0-No pain             Performance Status: 0    General: well appearing female in no acute distress  Neuro: alert and oriented  HEENT: sclera anicteric, oropharynx clear  Lymphatics: Left posterior " cervical lymph node, approximately 1-1/2 cm in size.  Slightly prominent right submandibular node.  No other palpable adenopathy  cardiovascular: regular rate and rhythm, no murmurs  Lungs: clear to auscultation bilaterally  Abdomen: soft, nontender, nondistended.  No palpable organomegaly  Extremeties: no lower extremity edema.    Skin: No rashes, lesions, or bruising  Psych: Mood and affect appropriate    Lab Results   Component Value Date    WBC 8.43 09/29/2022    HGB 14.7 09/29/2022    HCT 45.6 09/29/2022    MCV 84.4 09/29/2022     09/29/2022     Lab Results   Component Value Date    GLUCOSE 96 03/24/2022    BUN 13 03/24/2022    CREATININE 0.98 03/24/2022    EGFRIFNONA 64 09/23/2021    BCR 13.3 03/24/2022    K 4.5 03/24/2022    CO2 23.0 03/24/2022    CALCIUM 9.4 03/24/2022    ALBUMIN 4.20 03/24/2022    AST 28 03/24/2022    ALT 25 03/24/2022         Assessment & Plan   Mary Ambrosio is a 47 y.o. year old female with stage II a ER positive AR positive HER-2 negative invasive ductal carcinoma of the left breast who returns for follow up.  She stopped tamoxifen about 6 months ago.    Cervical lymph node: Present for about a month and enlarging.  I will order CT chest abdomen pelvis as well as CT neck for evaluation.  I will contact her by phone with results.    Continue Effexor.    Follow will be scheduled pending CT results.    Total time of patient care on day of service including time prior to, face to face with patient, and following visit spent in reviewing records, lab results, discussion with patient, and documentation/charting was > 35 minutes.        Nancy Caruso MD  [facility] Hematology and Oncology    09/23/2021            CC:

## 2022-10-07 ENCOUNTER — HOSPITAL ENCOUNTER (OUTPATIENT)
Dept: CT IMAGING | Facility: HOSPITAL | Age: 47
Discharge: HOME | End: 2022-10-07
Admitting: INTERNAL MEDICINE

## 2022-10-07 DIAGNOSIS — C50.112 MALIGNANT NEOPLASM OF CENTRAL PORTION OF LEFT FEMALE BREAST, UNSPECIFIED ESTROGEN RECEPTOR STATUS: ICD-10-CM

## 2022-10-07 PROCEDURE — 25010000002 IOPAMIDOL 61 % SOLUTION: Performed by: INTERNAL MEDICINE

## 2022-10-07 PROCEDURE — 74177 CT ABD & PELVIS W/CONTRAST: CPT

## 2022-10-07 PROCEDURE — 71260 CT THORAX DX C+: CPT

## 2022-10-07 PROCEDURE — 70491 CT SOFT TISSUE NECK W/DYE: CPT

## 2022-10-07 RX ADMIN — IOPAMIDOL 85 ML: 612 INJECTION, SOLUTION INTRAVENOUS at 08:50

## 2022-10-10 ENCOUNTER — TELEPHONE (OUTPATIENT)
Dept: ONCOLOGY | Facility: CLINIC | Age: 47
End: 2022-10-10

## 2022-10-10 DIAGNOSIS — C50.112 MALIGNANT NEOPLASM OF CENTRAL PORTION OF LEFT FEMALE BREAST, UNSPECIFIED ESTROGEN RECEPTOR STATUS: Primary | ICD-10-CM

## 2022-10-10 NOTE — TELEPHONE ENCOUNTER
Called patient re: ct results.  Ct normal other than enlarged cervical LN which has an inflammatory appearance.  Will plan to f/u in 1 month with ultrasound of the neck, biopsy if still remains enlarged.

## 2022-11-02 ENCOUNTER — TELEPHONE (OUTPATIENT)
Dept: ONCOLOGY | Facility: CLINIC | Age: 47
End: 2022-11-02

## 2022-11-02 NOTE — TELEPHONE ENCOUNTER
Called central scheduling and spoke with Gretta. She has sent a email to [place] about getting patient worked in for Biopsy.

## 2022-11-02 NOTE — TELEPHONE ENCOUNTER
Caller: Mary Ambrosio    Relationship: Self    Best call back number:[phone]      What was the call regarding: PATIENT CALLED WANTED TO GO AHEAD AND SCHEDULE HER BIOPSY. SHE WANTS THE LATEST TIME AVAILABLE    Do you require a callback: YES

## 2022-11-02 NOTE — TELEPHONE ENCOUNTER
Left voicemail for patient letting her know she is scheduled for her Biopsy on 11/16/22 arriving at 10:30am. Gave her all the appointment details.

## 2022-11-16 ENCOUNTER — HOSPITAL ENCOUNTER (OUTPATIENT)
Dept: ULTRASOUND IMAGING | Facility: HOSPITAL | Age: 47
End: 2022-11-16

## 2022-12-06 ENCOUNTER — HOSPITAL ENCOUNTER (OUTPATIENT)
Dept: ULTRASOUND IMAGING | Facility: HOSPITAL | Age: 47
Discharge: HOME | End: 2022-12-06
Admitting: INTERNAL MEDICINE

## 2022-12-06 DIAGNOSIS — C50.112 MALIGNANT NEOPLASM OF CENTRAL PORTION OF LEFT FEMALE BREAST, UNSPECIFIED ESTROGEN RECEPTOR STATUS: ICD-10-CM

## 2022-12-06 PROCEDURE — 88307 TISSUE EXAM BY PATHOLOGIST: CPT | Performed by: INTERNAL MEDICINE

## 2022-12-06 PROCEDURE — 88342 IMHCHEM/IMCYTCHM 1ST ANTB: CPT | Performed by: INTERNAL MEDICINE

## 2022-12-06 PROCEDURE — 76942 ECHO GUIDE FOR BIOPSY: CPT

## 2022-12-06 RX ORDER — LIDOCAINE HYDROCHLORIDE 10 MG/ML
5 INJECTION, SOLUTION EPIDURAL; INFILTRATION; INTRACAUDAL; PERINEURAL ONCE
Status: COMPLETED | OUTPATIENT
Start: 2022-12-06 | End: 2022-12-06

## 2022-12-06 RX ADMIN — LIDOCAINE HYDROCHLORIDE 2 ML: 10 INJECTION, SOLUTION EPIDURAL; INFILTRATION; INTRACAUDAL; PERINEURAL at 12:17

## 2022-12-06 NOTE — PRE-PROCEDURE NOTE
[facility]   Vascular Interventional Radiology  History & Physicial    Pertinent information including components of history, physical examination, review of systems, lab and imaging data, and impression and plan from this source was also reviewed for the creation of the following pre-procedural note: Progress Notes by Nancy Caruso MD (2022 13:00)         Patient Name:Mary Ambrosio    : 1975    MRN: [id number]    Primary Care Physician: Souleymane Guevara MD    Referring Physician: Nancy Caruso MD     Date of admission: 2022    Subjective     Reason for Consult: L cervical LN bx    History of Present Illness     Mary Ambrosio is a 47 y.o. female referred to IR as noted above.      Active Hospital Problems:  There are no active hospital problems to display for this patient.      Personal History     Past Medical History:   Diagnosis Date   • Cancer (HCC) 2017    breast- LEFT    • Dental bridge present     TOP FOR INVISALINE PLACEMENT    • Depression    • Disease of thyroid gland    • Dizzy    • Hot flashes    • Migraines    • Psoriasis     SCALP PRN SHAMPOO    • Seasonal allergies    • Sleep apnea     CPAP COMPLIANT    • Vertigo     PRN MECLAZINE        Past Surgical History:   Procedure Laterality Date   • ABDOMINAL SURGERY     • BREAST BIOPSY Left 2017   • BREAST IMPLANT SURGERY Bilateral 2018    Procedure: BREAST IMPLANT REVISION BILATERAL - EXCISION OF REDUNTANT SKIN;  Surgeon: Kym Burnett MD;  Location: [facility];  Service: Plastics   • BREAST IMPLANT SURGERY Bilateral 2020    Procedure: BREAST IMPLANT EXCHANGE WITH POCKET WORK AND COMPLEX CLOSURE BILATERAL;  Surgeon: Rogelio Dumont MD;  Location: [facility] OR;  Service: Plastics;  Laterality: Bilateral;   • BREAST LUMPECTOMY Left 2018   • BREAST RECONSTRUCTION, BREAST TISSUE EXPANDER INSERTION Bilateral 2017    Procedure: BILATERAL IMMED STAGED BREAST RECONSTRUCTION WITH EXPANDERS AND  ALLODERM;  Surgeon: Kym Burnett MD;  Location:  [place] OR;  Service:    • BREAST RECONSTRUCTION, BREAST TISSUE EXPANDER REMOVAL, IMPLANT INSERTION Bilateral 12/13/2017    Procedure: EXCHANGE BILATERAL TISSUE EXPANDERS  TO PERMANENT IMPLANTS;  Surgeon: Kmy Burnett MD;  Location:  [facility] OR;  Service:    • CHOLECYSTECTOMY  ~5/2018   • DILATATION AND CURETTAGE  1999   • FAT GRAFTING Bilateral 7/11/2019    Procedure: RECONSTRUCTED BREAST REVISION WITH FAT GRAFTING BILATERAL;  Surgeon: Rogelio Dumont MD;  Location:  [facility] OR;  Service: Plastics   • HYSTEROSCOPY ENDOMETRIAL ABLATION TUBAL STERILIZATION  07/2014   • LASER ABLATION     • LIPOMA EXCISION  07/2014    Right rib cage   • MASTECTOMY WITH SENTINEL NODE BIOPSY AND AXILLARY NODE DISSECTION Bilateral 4/12/2017    Procedure: LEFT BREAST MASTECTOMY WITH LEFT SENTINEL NODE BIOPSY AND POSS AXILLARY NODE DISSECTION, RIGHT PROPHYLATIC MASTECTOMY;  Surgeon: Patrice Almonte MD;  Location:  [facility] OR;  Service:    • TONSILLECTOMY  1992   • TUBAL ABDOMINAL LIGATION     • WOUND CLOSURE Bilateral 7/11/2019    Procedure: COMPLEX CLOSURE OF RIGHT BREAST;  Surgeon: Rogelio Dumont MD;  Location:  [facility] OR;  Service: Plastics       Family History: Her family history includes Throat cancer in her maternal uncle.     Social History: She  reports that she quit smoking about 18 years ago. Her smoking use included cigarettes. She has a 10.00 pack-year smoking history. She has never used smokeless tobacco. She reports current alcohol use. She reports that she does not use drugs.    Home Medications:  venlafaxine XR    Current Medications:  No current facility-administered medications for this encounter.     Allergies:  She is allergic to taxotere [docetaxel] and tegaderm ag mesh [silver].    Review of Systems    IR Procedure pertinent significant findings are mentioned in the PMH and PSH above.    Objective     There were no vitals taken for this visit.     Physical  Exam    A&Ox3.   Able to communicate  No Apparent Distress  Average physique   CVS: Regular rate and rhythm  Respiratory: Non labored breathing. No signs of respiratory compromise.    Result Review      I have personally reviewed the results from the time of this admission to 12/6/2022 12:21 EST and agree with these findings.  [x]  Laboratory  []  Microbiology  [x]  Radiology  []  EKG/Telemetry   []  Cardiology/Vascular   []  Pathology  []  Old records  []  Other:    Most notable findings include: As noted:          CrCl cannot be calculated (Patient's most recent lab result is older than the maximum 30 days allowed.). No results found for: CREATININE    SARS-CoV-2, JULIO   Date Value Ref Range Status   11/14/2021 Not Detected Not Detected Final     Comment:     This nucleic acid amplification test was developed and its performance  characteristics determined by [facility]. Nucleic acid  amplification tests include RT-PCR and TMA. This test has not been  FDA cleared or approved. This test has been authorized by FDA under  an Emergency Use Authorization (EUA). This test is only authorized  for the duration of time the declaration that circumstances exist  justifying the authorization of the emergency use of in vitro  diagnostic tests for detection of SARS-CoV-2 virus and/or diagnosis  of COVID-19 infection under section 564(b)(1) of the Act, 21 U.S.C.  360bbb-3(b) (1), unless the authorization is terminated or revoked  sooner.  When diagnostic testing is negative, the possibility of a false  negative result should be considered in the context of a patient's  recent exposures and the presence of clinical signs and symptoms  consistent with COVID-19. An individual without symptoms of COVID-19  and who is not shedding SARS-CoV-2 virus would expect to have a  negative (not detected) result in this assay.        Lab Results   Component Value Date    HCGQUANT <5.00 12/27/2017        ASA SCALE ASSESSMENT (applicable  ONLY if sedation planned):        MALLAMPATI CLASSIFICATION (applicable ONLY if sedation planned):       Assessment / Plan     Mary Ambrosio is a 47 y.o. female referred to the IR service with above problem.    Plan:   As above.    Notice: The note was created before the performance of the procedure. It might have been left in the pending status and signed off after the procedure. The time stamp on the note may be misleading.    Cruz Henley MD   Vascular Interventional Radiology  12/06/22   12:21 PM EST

## 2022-12-08 LAB
CYTO UR: NORMAL
LAB AP CASE REPORT: NORMAL
LAB AP CLINICAL INFORMATION: NORMAL
LAB AP DIAGNOSIS COMMENT: NORMAL
PATH REPORT.FINAL DX SPEC: NORMAL
PATH REPORT.GROSS SPEC: NORMAL

## 2022-12-09 ENCOUNTER — TELEPHONE (OUTPATIENT)
Dept: ONCOLOGY | Facility: CLINIC | Age: 47
End: 2022-12-09

## 2022-12-09 NOTE — TELEPHONE ENCOUNTER
Per Dr. Caruso, I called patient to let her know that the lymph node biopsy was negative for cancer and she can followup with us in a year.  Her last appt was Sept 2022 so we will schedule her for Sept. 2023.  Patient verbalized understanding.

## 2023-03-09 DIAGNOSIS — C50.112 MALIGNANT NEOPLASM OF CENTRAL PORTION OF LEFT FEMALE BREAST, UNSPECIFIED ESTROGEN RECEPTOR STATUS: ICD-10-CM

## 2023-03-09 RX ORDER — VENLAFAXINE HYDROCHLORIDE 150 MG/1
CAPSULE, EXTENDED RELEASE ORAL
Qty: 90 CAPSULE | Refills: 3 | Status: SHIPPED | OUTPATIENT
Start: 2023-03-09

## 2024-03-03 DIAGNOSIS — C50.112 MALIGNANT NEOPLASM OF CENTRAL PORTION OF LEFT FEMALE BREAST, UNSPECIFIED ESTROGEN RECEPTOR STATUS: ICD-10-CM

## 2024-03-04 RX ORDER — VENLAFAXINE HYDROCHLORIDE 150 MG/1
150 CAPSULE, EXTENDED RELEASE ORAL DAILY
Qty: 30 CAPSULE | Refills: 0 | Status: SHIPPED | OUTPATIENT
Start: 2024-03-04

## 2024-07-05 ENCOUNTER — HOSPITAL ENCOUNTER (EMERGENCY)
Age: 49
Discharge: HOME | End: 2024-07-05
Payer: COMMERCIAL

## 2024-07-05 VITALS
DIASTOLIC BLOOD PRESSURE: 67 MMHG | SYSTOLIC BLOOD PRESSURE: 110 MMHG | OXYGEN SATURATION: 96 % | TEMPERATURE: 97.7 F | RESPIRATION RATE: 19 BRPM | HEART RATE: 59 BPM

## 2024-07-05 VITALS
HEART RATE: 83 BPM | TEMPERATURE: 98.42 F | DIASTOLIC BLOOD PRESSURE: 72 MMHG | SYSTOLIC BLOOD PRESSURE: 169 MMHG | RESPIRATION RATE: 17 BRPM | OXYGEN SATURATION: 98 %

## 2024-07-05 VITALS — BODY MASS INDEX: 31.1 KG/M2
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
## 2024-07-05 DIAGNOSIS — L23.7: Primary | ICD-10-CM

## 2024-07-05 PROCEDURE — 99204 OFFICE O/P NEW MOD 45 MIN: CPT

## 2024-07-05 PROCEDURE — 96372 THER/PROPH/DIAG INJ SC/IM: CPT

## 2024-07-05 PROCEDURE — G0463 HOSPITAL OUTPT CLINIC VISIT: HCPCS

## 2024-07-05 PROCEDURE — 99212 OFFICE O/P EST SF 10 MIN: CPT

## 2024-09-26 ENCOUNTER — OFFICE VISIT (OUTPATIENT)
Dept: ORTHOPEDIC SURGERY | Facility: CLINIC | Age: 49
End: 2024-09-26
Payer: OTHER MISCELLANEOUS

## 2024-09-26 VITALS
BODY MASS INDEX: 29.57 KG/M2 | SYSTOLIC BLOOD PRESSURE: 128 MMHG | WEIGHT: 184 LBS | DIASTOLIC BLOOD PRESSURE: 80 MMHG | HEIGHT: 66 IN

## 2024-09-26 DIAGNOSIS — M75.52 BURSITIS OF LEFT SHOULDER: ICD-10-CM

## 2024-09-26 DIAGNOSIS — M25.512 LEFT SHOULDER PAIN, UNSPECIFIED CHRONICITY: ICD-10-CM

## 2024-09-26 DIAGNOSIS — S46.002A INJURY OF LEFT ROTATOR CUFF, INITIAL ENCOUNTER: Primary | ICD-10-CM

## 2024-09-26 DIAGNOSIS — M75.42 IMPINGEMENT SYNDROME OF LEFT SHOULDER: ICD-10-CM

## 2024-09-26 DIAGNOSIS — S46.819A STRAIN OF DELTOID MUSCLE, INITIAL ENCOUNTER: ICD-10-CM

## 2024-09-26 DIAGNOSIS — M75.22 BICEPS TENDINITIS OF LEFT UPPER EXTREMITY: ICD-10-CM
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
## 2024-09-26 RX ORDER — TIRZEPATIDE 15 MG/.5ML
INJECTION, SOLUTION SUBCUTANEOUS
COMMUNITY
Start: 2024-09-25

## 2024-09-26 RX ORDER — ROSUVASTATIN CALCIUM 5 MG/1
TABLET, COATED ORAL
COMMUNITY
Start: 2024-09-24

## 2024-09-26 RX ORDER — DICLOFENAC SODIUM 75 MG/1
TABLET, DELAYED RELEASE ORAL
COMMUNITY

## 2024-09-26 RX ORDER — METOPROLOL SUCCINATE 25 MG/1
TABLET, EXTENDED RELEASE ORAL
COMMUNITY
Start: 2024-09-24

## 2024-10-18 ENCOUNTER — TELEPHONE (OUTPATIENT)
Dept: ORTHOPEDIC SURGERY | Facility: CLINIC | Age: 49
End: 2024-10-18
Payer: COMMERCIAL

## 2024-10-18 NOTE — TELEPHONE ENCOUNTER
*HUB OK TO RELAY AND SCHEDULE F/U*  LVM FOR PATIENT TO INFORM OF MRI/CT APPT ON 10/19, ARRIVE AT 10AM AT [address] IN [place]. F/U NEEDS SCHEDULED FOR AT LEAST 3 DAYS AFTER.     *IF THIS APPT DOES NOT WORK FOR THE PATIENT, PLEASE GIVE THEM THE NUMBER TO CENTRAL SCHEDULING SO THEY CAN SCHEDULE IT DIRECTLY, [phone]

## 2024-11-01 ENCOUNTER — HOSPITAL ENCOUNTER (OUTPATIENT)
Dept: MRI IMAGING | Facility: HOSPITAL | Age: 49
Discharge: HOME | End: 2024-11-01
Admitting: ORTHOPAEDIC SURGERY
Payer: OTHER MISCELLANEOUS

## 2024-11-01 DIAGNOSIS — M75.42 IMPINGEMENT SYNDROME OF LEFT SHOULDER: ICD-10-CM

## 2024-11-01 DIAGNOSIS — M75.22 BICEPS TENDINITIS OF LEFT UPPER EXTREMITY: ICD-10-CM

## 2024-11-01 DIAGNOSIS — S46.819A STRAIN OF DELTOID MUSCLE, INITIAL ENCOUNTER: ICD-10-CM

## 2024-11-01 DIAGNOSIS — S46.002A INJURY OF LEFT ROTATOR CUFF, INITIAL ENCOUNTER: ICD-10-CM

## 2024-11-01 DIAGNOSIS — M75.52 BURSITIS OF LEFT SHOULDER: ICD-10-CM

## 2024-11-01 DIAGNOSIS — M25.512 LEFT SHOULDER PAIN, UNSPECIFIED CHRONICITY: ICD-10-CM

## 2024-11-01 PROCEDURE — 73221 MRI JOINT UPR EXTREM W/O DYE: CPT

## 2024-11-07 ENCOUNTER — OFFICE VISIT (OUTPATIENT)
Dept: ORTHOPEDIC SURGERY | Facility: CLINIC | Age: 49
End: 2024-11-07
Payer: OTHER MISCELLANEOUS

## 2024-11-07 VITALS — BODY MASS INDEX: 29.75 KG/M2 | HEIGHT: 66 IN

## 2024-11-07 DIAGNOSIS — S46.002A INJURY OF LEFT ROTATOR CUFF, INITIAL ENCOUNTER: ICD-10-CM

## 2024-11-07 DIAGNOSIS — M75.52 BURSITIS OF LEFT SHOULDER: ICD-10-CM
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
## 2024-11-07 DIAGNOSIS — S46.012A TRAUMATIC COMPLETE TEAR OF LEFT ROTATOR CUFF, INITIAL ENCOUNTER: ICD-10-CM

## 2024-11-07 DIAGNOSIS — S46.819A STRAIN OF DELTOID MUSCLE, INITIAL ENCOUNTER: ICD-10-CM

## 2024-11-07 DIAGNOSIS — M75.22 BICEPS TENDINITIS OF LEFT UPPER EXTREMITY: ICD-10-CM

## 2024-11-07 DIAGNOSIS — M75.42 IMPINGEMENT SYNDROME OF LEFT SHOULDER: ICD-10-CM

## 2024-11-07 NOTE — PROGRESS NOTES
[facility] [place] Orthopaedic Surgery Office Follow Up - Lorenzo Serrano MD  [facility]    Office Follow Up Visit       Patient Name: Mary Ambrosio    Chief Complaint:   Chief Complaint   Patient presents with    Follow-up     2 month (MRI) follow-up: Injury of left rotator cuff, subsequent encounter;   Workers' Compensation Injury (DOI: 8/23/24)       Referring Physician: No ref. provider found    History of Present Illness:   It has been 2  month(s) since Mary Ambrosio's last visit. Mary Ambrosio returns to clinic today for F/U: follow-up of leftBody Part: shoulderReason: pain. The issue has been ongoing for 3 month(s). Mary Ambrosio rates HIS/HER: herpain at 4/10 on the pain scale and 6-7/10 when reaching behind. Previous/current treatments: physical therapy and NSAIDs. Current symptoms:Symptoms: pain. The pain is worse with working, lying on affected side, any movement of the joint, and lifting ; ice and NSAIDs improves the pain. Overall, he/she: sheis doing the same.  I have reviewed the patient's history of present illness as noted/entered above.    I have reviewed the patient's past medical history, surgical history, social history, family history, medications, and allergies as noted in the electronic medical record and as noted/entered.  I have reviewed the patient's review of systems as noted/enter and updated as noted in the patient's HPI.    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 with the parts and fits into an even bigger dez (parent dez); has to unlock the child dez use a large lever -- the large lever was stuck and about 1.5 hour, delivers every 10 minutes; lever that pulls and goes across with it  Date of Injury at Work:  "8/23/2024  Mechanism of Injury at Work: as noted above  Current Work Status: LEFT SHOULDER -- not doing 2 of the jobs that involve lifting; lifting restriction  TREATMENTS: GUIDED PT for 4 weeks, helping some, treated with oral medications/NSAIDs  Diagnostic Tests: xrays 9/26/2024 left shoulder     Biceps pain, \"no strength\"   Deltoid pain     Enjoy: making cakes in the past, but now working full time and less time        LEFT breast cancer -- doing well on that front  History of left-sided breast cancer last note from Dr. Caruso reviewed from 2022:  \"PROBLEM LIST:  1. [id number] invasive ductal carcinoma of the left breast, ER positive DE positive HER-2 negative (0+)  A) the patient presented with skin dimpling under the left breast. A needle localized biopsy was done on 2/8/2017. Pathology showed a 2.2 cm invasive ductal carcinoma. There were focally positive margins.   Bilateral mastectomy done on 4/12/17.  Pathology showed residual in situ DICIS.  0/1 SLN involved.  [id number] (Stage IIA).  Oncotype score 34 in the high risk group.  Adjuvant chemotherapy with taxotere and cyclophophamide started 5/9/17.  Long delay between cycle 1 and cycle 2 due to mastectomy wound infection.  Severe allergic reaction after cycle 3 with hives, tightness in throat, and swelling of face that lasted for over 2 weeks despite high dose steroids.  CMF given for cycle #4.  B) tamoxifen started October 2017. Enrolled in  of everolimus versus placebo.  Study treatment stopped due to grade 4 hypertriglyceridemia.  Breast cancer index testing showed 5-6% risk of late recurrence, no predicted benefit of extended endocrine therapy. Patient stopped tamoxifen April 2022.\"     2 kids - college graduate, daughter Nursing at [employer] and one in high school   has family [place], they may relocate there in the future        49 y.o. female  Body mass index is 29.71 kg/m².    11/7/2024:  Left shoulder  Left shoulder MRI completed  Completed " physical therapy 2 times per week at [facility].    MRI SHOULDER LEFT WO CONTRAST     Date of Exam: 11/1/2024 1:51 PM EDT     Indication: LEFT SHOULDER rotator cuff injury.     Comparison: 9/26/2024 radiographs     Technique:  Routine multiplanar/multisequence images of the left shoulder were obtained without contrast administration.          Findings:  There is a full-thickness partial width tear of the far anterior distal supraspinatus at the footprint measuring 9 mm as seen on series 8 image 7 and series 5 image 7. There is background moderate supraspinatus tendinopathy. There is no evidence of   additional rotator cuff tear. No significant rotator cuff fatty muscle atrophy.     Long head biceps tendon is intact. No evidence of displaced glenoid labral tear or paralabral cyst formation. There is mild thinning of the inferior humeral head articular cartilage. No significant glenohumeral joint effusion.     Mild AC joint arthritis. No evidence of subacromial spur formation or os acromiale.     No acute fracture or suspicious bone lesion. Remaining visualized soft tissues are within normal limits.     IMPRESSION:  Impression:  Full-thickness partial width tear of the far anterior distal supraspinatus tendon at the footprint with minimal retraction. Background moderate supraspinatus tendinopathy. No evidence of significant fatty muscle atrophy.     Minimal glenohumeral and mild AC joint arthritis as above. Glenoid labrum appears intact.           Electronically Signed: Ronnie Ko MD    11/5/2024 9:05 AM EST    Workstation ID: [id number]    I personally reviewed and interpreted MRI above.  Full-thickness rotator cuff tearing.  Some baseline degenerative changes as well in the glenohumeral joint.      Subjective   Subjective      Review of Systems   Constitutional: Negative.  Negative for chills, fatigue and fever.   HENT: Negative.  Negative for congestion and dental problem.    Eyes: Negative.  Negative for blurred  vision.   Respiratory: Negative.  Negative for shortness of breath.    Cardiovascular: Negative.  Negative for leg swelling.   Gastrointestinal: Negative.  Negative for abdominal pain.   Endocrine: Negative.  Negative for polyuria.   Genitourinary: Negative.  Negative for difficulty urinating.   Musculoskeletal:  Positive for arthralgias.   Skin: Negative.    Allergic/Immunologic: Negative.    Neurological: Negative.    Hematological: Negative.  Negative for adenopathy.   Psychiatric/Behavioral: Negative.  Negative for behavioral problems.         Past Medical History:   Past Medical History:   Diagnosis Date    Cancer 02/2017    breast- LEFT     Dental bridge present     TOP FOR INVISALINE PLACEMENT     Depression     Disease of thyroid gland     Dizzy     Hot flashes     Migraines     Psoriasis     SCALP PRN SHAMPOO     Seasonal allergies     Sleep apnea     CPAP COMPLIANT     Vertigo     PRN MECLAZINE        Past Surgical History:   Past Surgical History:   Procedure Laterality Date    ABDOMINAL SURGERY      BREAST BIOPSY Left 01/20/2017    BREAST IMPLANT SURGERY Bilateral 8/8/2018    Procedure: BREAST IMPLANT REVISION BILATERAL - EXCISION OF REDUNTANT SKIN;  Surgeon: Kym Burnett MD;  Location:  [facility];  Service: Plastics    BREAST IMPLANT SURGERY Bilateral 9/2/2020    Procedure: BREAST IMPLANT EXCHANGE WITH POCKET WORK AND COMPLEX CLOSURE BILATERAL;  Surgeon: Rogelio Dumont MD;  Location:  [facility];  Service: Plastics;  Laterality: Bilateral;    BREAST LUMPECTOMY Left 2/8/2018    BREAST RECONSTRUCTION, BREAST TISSUE EXPANDER INSERTION Bilateral 4/12/2017    Procedure: BILATERAL IMMED STAGED BREAST RECONSTRUCTION WITH EXPANDERS AND ALLODERM;  Surgeon: Kym Burnett MD;  Location:  [facility];  Service:     BREAST RECONSTRUCTION, BREAST TISSUE EXPANDER REMOVAL, IMPLANT INSERTION Bilateral 12/13/2017    Procedure: EXCHANGE BILATERAL TISSUE EXPANDERS  TO PERMANENT IMPLANTS;  Surgeon: Kym Esteban  MD Lex;  Location:  [facility];  Service:     CHOLECYSTECTOMY  ~2018    DILATATION AND CURETTAGE  1999    FAT GRAFTING Bilateral 2019    Procedure: RECONSTRUCTED BREAST REVISION WITH FAT GRAFTING BILATERAL;  Surgeon: Rogelio Dumont MD;  Location:  [facility];  Service: Plastics    HYSTEROSCOPY ENDOMETRIAL ABLATION TUBAL STERILIZATION  2014    LASER ABLATION      LIPOMA EXCISION  2014    Right rib cage    MASTECTOMY WITH SENTINEL NODE BIOPSY AND AXILLARY NODE DISSECTION Bilateral 2017    Procedure: LEFT BREAST MASTECTOMY WITH LEFT SENTINEL NODE BIOPSY AND POSS AXILLARY NODE DISSECTION, RIGHT PROPHYLATIC MASTECTOMY;  Surgeon: Patrice Almonte MD;  Location:  [facility];  Service:     TONSILLECTOMY  1992    TUBAL ABDOMINAL LIGATION      US GUIDED LYMPH NODE BIOPSY  2022    WOUND CLOSURE Bilateral 2019    Procedure: COMPLEX CLOSURE OF RIGHT BREAST;  Surgeon: Rogelio Dumont MD;  Location:  [facility];  Service: Plastics       Family History:   Family History   Problem Relation Age of Onset    Throat cancer Maternal Uncle        Social History:   Social History     Socioeconomic History    Marital status:    Tobacco Use    Smoking status: Former     Current packs/day: 0.00     Average packs/day: 1 pack/day for 10.0 years (10.0 ttl pk-yrs)     Types: Cigarettes     Start date:      Quit date:      Years since quittin.8     Passive exposure: Past    Smokeless tobacco: Never   Vaping Use    Vaping status: Never Used   Substance and Sexual Activity    Alcohol use: Yes     Comment: VERY RARELY SOCIAL     Drug use: No    Sexual activity: Defer       Medications:   Current Outpatient Medications:     diclofenac (VOLTAREN) 75 MG EC tablet, TAKE 1 TABLET BY MOUTH TWICE DAILY (IN THE MORNING AND EVENING) TAKE WITH FOOD. DO NOT TAKE OTHER NSAIDS, DO NOT CRUSH, CHEW, OR SPLIT, Disp: , Rfl:     metoprolol succinate XL (TOPROL-XL) 25 MG 24 hr tablet, , Disp: , Rfl:     rosuvastatin  "(CRESTOR) 5 MG tablet, , Disp: , Rfl:     venlafaxine XR (EFFEXOR-XR) 150 MG 24 hr capsule, Take 1 capsule by mouth Daily. PATIENT MUST CALL TO SCHEDULE A FOLLOW UP APPOINTMENT FOR FUTURE REFILLS [phone], Disp: 30 capsule, Rfl: 0    Zepbound 15 MG/0.5ML solution auto-injector, , Disp: , Rfl:     Allergies:   Allergies   Allergen Reactions    Taxotere [Docetaxel] Hives and Shortness Of Breath    Tegaderm Ag Mesh [Silver] Rash     Prolonged exposure        The following portions of the patient's history were reviewed and updated as appropriate: allergies, current medications, past family history, past medical history, past social history, past surgical history and problem list.        Objective    Objective      Vital Signs:   Vitals:    11/07/24 0933   Height: 167.5 cm (65.95\")       Ortho Exam:  LEFT SHOULDER  Left shoulder pain persists.  She has pain and weakness with Jobes testing.  Pain limited range of motion 120/120/50/80/70 painful arc more than 90 degrees positive Neer positive Hassan does have painful biceps testing with uppercut less tenderness to palpation today.  Counseled a possibility of intraoperative findings pointing toward biceps tenodesis pending intraoperative assessment.  Deltoid insertional pain and rotator cuff pain and weakness with Jobes testing.    Results Review:  Imaging Results (Last 24 Hours)       ** No results found for the last 24 hours. **            MRI Shoulder Left Without Contrast    Result Date: 11/5/2024  Impression: Full-thickness partial width tear of the far anterior distal supraspinatus tendon at the footprint with minimal retraction. Background moderate supraspinatus tendinopathy. No evidence of significant fatty muscle atrophy. Minimal glenohumeral and mild AC joint arthritis as above. Glenoid labrum appears intact. Electronically Signed: Ronnie Ko MD  11/5/2024 9:05 AM EST  Workstation ID: [id number]    XR Shoulder, minimum of 2 views, complete - left CPT " 87062    Result Date: 8/26/2024  Examination Xray, multiple views of the Shoulder left Comparison None provided.   Findings The soft tissues are unremarkable. No fracture or other acute abnormality. The joint spaces are well maintained. IMPRESSION: No fracture or other acute abnormality. Electronically signed on Aug 26, 2024 5:13:07 PM EDT (ET) by: Lyric Bailey M.D. Note: This report describes relevant significant findings, based on the clinical setting and information provided. Further details, consultation or follow-up imaging may be obtained as clinically indicated.    MRI SHOULDER LEFT WO CONTRAST     Date of Exam: 11/1/2024 1:51 PM EDT     Indication: LEFT SHOULDER rotator cuff injury.     Comparison: 9/26/2024 radiographs     Technique:  Routine multiplanar/multisequence images of the left shoulder were obtained without contrast administration.          Findings:  There is a full-thickness partial width tear of the far anterior distal supraspinatus at the footprint measuring 9 mm as seen on series 8 image 7 and series 5 image 7. There is background moderate supraspinatus tendinopathy. There is no evidence of   additional rotator cuff tear. No significant rotator cuff fatty muscle atrophy.     Long head biceps tendon is intact. No evidence of displaced glenoid labral tear or paralabral cyst formation. There is mild thinning of the inferior humeral head articular cartilage. No significant glenohumeral joint effusion.     Mild AC joint arthritis. No evidence of subacromial spur formation or os acromiale.     No acute fracture or suspicious bone lesion. Remaining visualized soft tissues are within normal limits.     IMPRESSION:  Impression:  Full-thickness partial width tear of the far anterior distal supraspinatus tendon at the footprint with minimal retraction. Background moderate supraspinatus tendinopathy. No evidence of significant fatty muscle atrophy.     Minimal glenohumeral and mild AC joint arthritis  "as above. Glenoid labrum appears intact.           Electronically Signed: Ronnie Ko MD    11/5/2024 9:05 AM EST    Workstation ID: [id number]    I personally reviewed and interpreted the images above.  Left shoulder MRI-full-thickness tear rotator cuff.  Far anterior supraspinatus tear        Procedures            Assessment / Plan      Assessment/Plan:   Problem List Items Addressed This Visit          Musculoskeletal and Injuries    Biceps tendinitis of left upper extremity    Impingement syndrome of left shoulder    Bursitis of left shoulder    Injury of left rotator cuff    Work related injury - Primary       Other    Strain of deltoid muscle, initial encounter     Other Visit Diagnoses       Traumatic complete tear of left rotator cuff, initial encounter                LEFT SHOULDER  Risks and benefits of continued nonoperative management versus surgical management were discussed. The patient desires to proceed with operative intervention.    Rotator cuff repair with possible biceps tenodesis and subacromial decompression with acromioplasty as indicated. Sometimes the rotator cuff tear is not repairable and may require debridement or partial repair. The procedure is routinely done arthroscopically, but sometimes a larger incision needs to be made to complete the repair in an \"open\" fashion for rare cases.    Specific risks include pain, bleeding, infection, injury to surrounding nerve and blood vessels, fracture, failure or lack of healing of rotator cuff repair, incomplete pain relief, hardware failure, potential need for additional procedures, stiffness after surgery, possible biceps deformity, and potential inability to restore range of motion and strength. Medical, anesthetic, and block complications are possible.    General anesthesia is required, sling compliance, and compliance with physical therapy and/or a surgeon guided exercise program will be very important to the recovery process.    Opioid " medications are utilized for a short course after surgery for pain control.     LEFT SHOULDER  Rotator cuff tear - Arthroscopic rotator cuff repair CPT code 39232  Biceps tendonitis - Arthroscopic biceps tenodesis CPT code 14647 - possible pending intraoperative findings  Shoulder impingement syndrome    Bilateral mastectomy and lymph node resection on the left.  Understands possibility of lymphedema postoperatively.      Ultrasling III - a neutral rotation sling is recommended for this patient for perioperative care.  The patient was fitted for the sling and the sling was provided today.  The sling will be a critical part of the perioperative care for these diagnoses.      The patient desires to proceed with LEFT shoulder surgery.    WORK INJURY:  Work status:   LEFT ARM  10 lbs lifting restriction with the affected arm  No overhead lifting with the affected arm  No repetitive lift/push/pull with the affected arm    LEFT SIDE - LN resection; possible lymphedema counseled        Physical therapy prescription, okay to continue PT at this time.     MMI: not reached     Projection for MMI: Unable to determine at this time     Long-term Impairments: Unable to determine if there will be future IMPAIRMENTS at this time     Long-term or Permanent Restrictions: Unable to determine if there will be long-term RESTRICTIONS at this time        Follow Up: LEFT SHOULDER      Lorenzo Serrano MD, FAAOS  Orthopedic Surgeon, Shoulder Surgery  [facility]  Orthopedics and Sports Medicine  [address], [address]  [place] [address]    & New Location:  [facility] Location  [address], [address]  [place] [address]    11/07/24  10:09 EST

## 2024-11-25 ENCOUNTER — TELEPHONE (OUTPATIENT)
Dept: ORTHOPEDIC SURGERY | Facility: CLINIC | Age: 49
End: 2024-11-25
Payer: COMMERCIAL

## 2024-11-25 NOTE — TELEPHONE ENCOUNTER
"Caller: Mary Ambrosio \"Unique\"    Relationship to patient: Self    Best call back number: [phone]    Chief complaint: LEFT SHOULDER     Type of visit: SURGERY    Requested date: ASAP     Additional notes:REQUESTING STATUS        "

## 2024-12-06 ENCOUNTER — DOCUMENTATION (OUTPATIENT)
Dept: ORTHOPEDIC SURGERY | Facility: CLINIC | Age: 49
End: 2024-12-06

## 2024-12-06 ENCOUNTER — OUTSIDE FACILITY SERVICE (OUTPATIENT)
Dept: ORTHOPEDIC SURGERY | Facility: CLINIC | Age: 49
End: 2024-12-06
Payer: COMMERCIAL

## 2024-12-06 DIAGNOSIS — S46.012A TRAUMATIC COMPLETE TEAR OF LEFT ROTATOR CUFF, INITIAL ENCOUNTER: ICD-10-CM

## 2024-12-06 DIAGNOSIS — Z98.890 STATUS POST LEFT ROTATOR CUFF REPAIR: Primary | ICD-10-CM

## 2024-12-06 DIAGNOSIS — M75.22 BICEPS TENDINITIS OF LEFT UPPER EXTREMITY: ICD-10-CM

## 2024-12-06 RX ORDER — METHOCARBAMOL 500 MG/1
500 TABLET, FILM COATED ORAL 3 TIMES DAILY PRN
Qty: 42 TABLET | Refills: 0 | Status: SHIPPED | OUTPATIENT
Start: 2024-12-06 | End: 2024-12-20

## 2024-12-06 RX ORDER — ONDANSETRON 4 MG/1
4 TABLET, FILM COATED ORAL EVERY 6 HOURS PRN
Qty: 32 TABLET | Refills: 0 | Status: SHIPPED | OUTPATIENT
Start: 2024-12-06 | End: 2024-12-14

## 2024-12-06 RX ORDER — HYDROCODONE BITARTRATE AND ACETAMINOPHEN 10; 325 MG/1; MG/1
1 TABLET ORAL EVERY 6 HOURS PRN
Qty: 28 TABLET | Refills: 0 | Status: SHIPPED | OUTPATIENT
Start: 2024-12-06 | End: 2024-12-13

## 2024-12-06 NOTE — PROGRESS NOTES
Operative Report  LOCATION:   [facility]  [address] [address]    [facility] OPERATIVE REPORT  Surgeon: Lorenzo Serrano MD      Date of Surgery: 12/6/2024  Shoulder: LEFT shoulder   Preoperative Diagnosis:  1.     Rotator cuff tear, full thickness extension - treated with arthroscopic rotator cuff repair - CPT 53082  2.     Shoulder impingement syndrome, partial tearing CA ligament - treated with arthroscopic subacromial decompression with acromioplasty - CPT 47021     Postoperative Diagnosis:  1.     SAME as preoperative diagnoses, LHB intact     Procedure:  1.     Arthroscopic rotator cuff repair (1x1) - CPT 73734  2.     Arthroscopic subacromial decompression with acromioplasty - CPT 59408        Admission status: elective outpatient surgery  Complications: None  EBL: 10mL  Specimen: NONE  Anesthesia: general anesthesia  Block: regional interscalene block with single shot per anesthesia  Antibiotics: weight based IV antibiotics infused prior to incision  Time out: Time out was called and the patient, side, site, and intended procedures were confirmed.  Counts: Needle and sponge counts were correct prior to closure.  Marked: The patient was marked with an indelible marker in the preoperative holding area confirming the correct side and site.     History & Physical:   A history and physical examination was completed and updated in the preoperative holding area.     Consent: The patient signed the consent form for surgery with an understanding of the risks and benefits as outlined in the clinic and in the preoperative holding area.     Risks and Benefits: Specific risks and benefits discussed included - pain, bleeding, infection, injury to nerves or blood vessels, fracture, stiffness, failure to heal, revision surgery, deformity of biceps or asymmetry, complications of the block, anesthetic complications, and medical complications  associated with surgery.       Indications for surgery:  The patient has history, physical examination, and radiographic findings confirming the diagnoses.  The patient has persistent pain and functional loss unresponsive to non-operative treatment consisting of selective rest/activity modification, medications, and exercises.  MRI documented the status of the rotator cuff - rotator cuff tearing was noted.     Impingement syndrome - the patient had history and clinical exam findings consistent with shoulder impingement syndrome.  Additionally, the patient had subacromial bursitis which was encountered during the arthroscopic shoulder procedure.  Subacromial decompression with minimal acromioplasty was indicated as part of the treatment of impingement syndrome, and additionally to enhance arthroscopic visualization to enable minimally invasive, arthroscopic rotator cuff repair.  Partial tearing of the CA ligament was noted and debrided as part of the subacromial decompression.        Operative Findings:  Rotator Cuff Tissue Quality: Full-thickness rotator cuff tear     Status of the Rotator Cuff:  Supraspinatus -full-thickness     Size of Rotator Cuff Tear:  Supraspinatus -10 mm     Rotator Cuff Repair Construct:  1x1 Transosseous Equivalent Double Row Arthroscopic Rotator Cuff Repair      Rotator Cuff Implants:  Medial Row: 1 Medial [place] - double loaded [employer] 4.5mm Healicoil PEEK   Lateral Row: 1 Lateral [place] - [employer] 5.5mm Footprint PEEK      Bone Quality: Solid anchor fixation on the medial and lateral row  Labrum: Perhaps mild degenerative changes  Humeral Head: Negative  Glenoid: Negative  Contracture: Negative  Pathological laxity: Negative      Work-related injury rotator cuff tearing was noted on MRI with partial tearing and full-thickness extension.  The full-thickness distention was noted intraoperatively.  Fortunately the biceps tendon and superior portion labrum were well-appearing  intraoperatively.     Procedure in detail:  Regional interscalene block was completed in the preoperative area by the anesthesia team.  The patient was supine on the operative table and the anesthesia team initiated general anesthesia.  The patient was placed in a modified beach chair position with the neck secured in neutral alignment and all bony prominences were well padded.     The shoulder was assessed with an examination under anesthesia.     The operative extremity was cleaned with alcohol and then the extremity was prepped and draped in the standard fashion.  The surgical arm was placed into a well-padded arm connell. A standard posterior portal was created with an incision made 1 cm inferior 1 cm medial to the posterolateral acromial corner.  A blunt metal trocar and cannula were inserted into the glenohumeral joint.  The arthroscopic pump was connected and the above findings and diagnoses were noted as part of the diagnostic shoulder arthroscopy.       A spinal needle was used to localize the anterior portal position in the rotator interval. A 5.5mm plastic cannula and trocar were inserted followed by a 4.5mm shaver/cautery device.  The shaver was used for debridement in the glenohumeral joint.  The biceps tendon was closely inspected and biceps tenodesis was not warranted.  The rotator cuff tearing from the articular side was marked with a spinal needle and was identified in the subacromial space and was significantly more advanced on the bursal side with full-thickness extension noted and fairly sizable extension of the tear.     The arthroscope and metal cannula were then removed in order to transition to the subacromial space.  The blunt metal trocar and cannula were inserted into the subacromial space and the lateral portal site identified with a spinal needle.  An 8 mm plastic cannula and trocar were inserted.  I turned my attention to the arthroscopic subacromial decompression with acromioplasty.  Bursitis was noted in the subacromial space and impacted visualization of the rotator cuff.  The 4.5mm shaver/cautery device was used to clear the subacromial space until the acromion could be identified and bursal resection was completed to allow rotator cuff visualization.  The shaver was used to remove fibrous tissue from the acromial undersurface until the anterolateral and anterior medial corners of the acromion were clearly identified.  The coracoacromial ligament was not released but partial tearing the CA ligament was noted and debrided as part of the subacromial decompression.  The shaver was used to perform a minimal acromioplasty.  The shaver/cautery device was used to perform the subacromial decompression with minimal acromioplasty.      I turned my attention to the rotator cuff tear and the arthroscopic rotator cuff repair.  The torn rotator cuff tendon was grasped with a handheld grasper and assessed for mobility and integrity.  The soft tissue at the greater tuberosity insertion site was removed and a power shaver was used to create a healthy bleeding bed to enhance rotator cuff tendon healing.     Arthroscopic rotator cuff suture anchors were then inserted for the rotator cuff repair.  The single medial row arthroscopic rotator cuff repair anchor was inserted and the sutures from the anchors were withdrawn through the anterior cannula. An arthroscopic suture passer was used to place the high strength sutures through the rotator cuff tendon in an inverted mattress fashion. The sutures were then inserted laterally into 1 lateral knotless anchor with appropriate tensioning.  The completed arthroscopic rotator cuff repair was inspected dynamically and the arthroscopic instruments removed along with the arthroscopic fluid. The incisions were closed with nylon suture and steri-strips were placed over the incisions.  A sterile dressing was applied followed by a neutral rotation sling.  The patient tolerated  the procedure well and was transported to the recovery room in satisfactory condition.          Rotator Cuff Repair - POSTOPERATIVE PLAN:  Follow-up in the office in 2 weeks with 1 view of the operative shoulder at that time  Sutures: Nylon sutures to come out in 2 weeks  DVT prophylaxis: No additional chemical DVT prophylaxis indicated  Weightbearing: Nonweightbearing on operative extremity, no biceps loading, pendulums/elbow/wrist/hand motion encouraged  Neutral rotation sling for 3 to 4 weeks following the surgery  Physical therapy: Formal outpatient physical therapy will be provided at the 2-week appointment in the office.  Rotator cuff repair protocol    Electronically signed by Lorenzo Serrano MD, 12/06/24, 10:33 AM EST.

## 2024-12-17 ENCOUNTER — OFFICE VISIT (OUTPATIENT)
Age: 49
End: 2024-12-17
Payer: OTHER MISCELLANEOUS

## 2024-12-17 ENCOUNTER — TELEPHONE (OUTPATIENT)
Dept: ORTHOPEDIC SURGERY | Facility: CLINIC | Age: 49
End: 2024-12-17
Payer: COMMERCIAL

## 2024-12-17 VITALS — TEMPERATURE: 98.1 F
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
## 2024-12-17 DIAGNOSIS — Z98.890 STATUS POST LEFT ROTATOR CUFF REPAIR: Primary | ICD-10-CM

## 2024-12-17 NOTE — PROGRESS NOTES
[facility] Follow Up       Office Follow Up Visit       Patient Name: Mary Ambrosio    Chief Complaint:   Chief Complaint   Patient presents with    Post-op     2 week s/p left Arthroscopic rotator cuff repair 12/6/24       Referring Physician: Romaan Del Cid PA    History of Present Illness:   Mary Ambrosio returns to clinic today for 2-week postop visit s/p left arthroscopic rotator cuff repair, subacromial decompression with Dr. Serrano.  Here today supportive .  She reports doing well.  Pain has been well-controlled.  She is taking pain medication and muscle relaxer at nighttime only.  She has been wearing her sling as instructed.    Work-related injury.  She is currently off work.    Date of Surgery: 12/6/2024  Shoulder: LEFT shoulder   Preoperative Diagnosis:  1.     Rotator cuff tear, full thickness extension - treated with arthroscopic rotator cuff repair - CPT 81521  2.     Shoulder impingement syndrome, partial tearing CA ligament - treated with arthroscopic subacromial decompression with acromioplasty - CPT 93556     Postoperative Diagnosis:  1.     SAME as preoperative diagnoses, LHB intact     Procedure:  1.     Arthroscopic rotator cuff repair (1x1) - CPT 23710  2.     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 with the parts and fits into an even bigger dez (parent dez); has to unlock the child dez use a large lever -- the large lever was stuck and about 1.5 hour, delivers every 10 minutes; lever that pulls and goes across with it  Date of Injury at Work: 8/23/2024  Mechanism of Injury at Work: as noted above  Current Work Status: LEFT SHOULDER -- not doing 2 of the jobs that involve lifting; lifting  restriction  TREATMENTS: GUIDED PT for 4 weeks, helping some, treated with oral medications/NSAIDs  Diagnostic Tests: xrays 9/26/2024 left shoulder    Subjective     Review of Systems   Constitutional:  Negative for chills, fever, unexpected weight gain and unexpected weight loss.   HENT:  Negative for congestion, postnasal drip and rhinorrhea.    Eyes:  Negative for blurred vision.   Respiratory:  Negative for shortness of breath.    Cardiovascular:  Negative for leg swelling.   Gastrointestinal:  Negative for abdominal pain, nausea and vomiting.   Genitourinary:  Negative for difficulty urinating.   Musculoskeletal:  Positive for arthralgias. Negative for gait problem, joint swelling and myalgias.   Skin:  Negative for skin lesions and wound.   Neurological:  Negative for dizziness, weakness, light-headedness and numbness.   Hematological:  Does not bruise/bleed easily.   Psychiatric/Behavioral:  Negative for depressed mood.         I have reviewed and updated the following portions of the patient's history and review of systems: allergies, current medications, past family history, past medical history, past social history, past surgical history and problem list.    Medications:   Current Outpatient Medications:     diclofenac (VOLTAREN) 75 MG EC tablet, TAKE 1 TABLET BY MOUTH TWICE DAILY (IN THE MORNING AND EVENING) TAKE WITH FOOD. DO NOT TAKE OTHER NSAIDS, DO NOT CRUSH, CHEW, OR SPLIT, Disp: , Rfl:     methocarbamol (ROBAXIN) 500 MG tablet, Take 1 tablet by mouth 3 (Three) Times a Day As Needed for Muscle Spasms for up to 14 days., Disp: 42 tablet, Rfl: 0    metoprolol succinate XL (TOPROL-XL) 25 MG 24 hr tablet, , Disp: , Rfl:     rosuvastatin (CRESTOR) 5 MG tablet, , Disp: , Rfl:     venlafaxine XR (EFFEXOR-XR) 150 MG 24 hr capsule, Take 1 capsule by mouth Daily. PATIENT MUST CALL TO SCHEDULE A FOLLOW UP APPOINTMENT FOR FUTURE REFILLS [phone], Disp: 30 capsule, Rfl: 0    Zepbound 15 MG/0.5ML solution  auto-injector, , Disp: , Rfl:     Allergies:   Allergies   Allergen Reactions    Taxotere [Docetaxel] Hives and Shortness Of Breath    Tegaderm Ag Mesh [Silver] Rash     Prolonged exposure          Objective      Vital Signs:   Vitals:    12/17/24 1153   Temp: 98.1 °F (36.7 °C)       Ortho Exam:  General: comfortable  Vascular: 2+ radial pulse  Neurologic: sensation to light touch is intact distally, elbow flexion/elbow extension/wrist flexion/wrist extension/hand intrinsics intact, able to fire deltoid; no residual defects noted from the block  Dermatologic: surgical incisions are well appearing, with no drainage or surrounding erythema; no signs or symptoms of infection or DVT      Results Review:  XR Shoulder 1 View Left  Left Shoulder X-Ray    Indication: Pain    Study:  Grashey AP, axillary lateral, and scapular Y views    Comparison: 9/26/24    Findings:  No acute fractures. No bony lesions. Normal soft tissues. AC joint   demonstrates arthritic change unchanged from prior imaging.  The   glenohumeral joint is well-maintained.      Impression:    No acute bony abnormalities noted.          Assessment / Plan      Assessment:   Diagnoses and all orders for this visit:    1. Status post left rotator cuff repair (Primary)  -     XR Shoulder 1 View Left  -     Ambulatory Referral to Physical Therapy for Evaluation & Treatment    2. Work related injury        Quality Metrics:   BMI:   BMI is >= 25 and <30. (Overweight) The following options were offered after discussion;: weight loss educational material (shared in after visit summary)       Tobacco:   Mary Ambrosio  reports that she quit smoking about 20 years ago. Her smoking use included cigarettes. She started smoking about 30 years ago. She has a 10 pack-year smoking history. She has been exposed to tobacco smoke. She has never used smokeless tobacco.           Plan:  2 weeks s/p left rotator cuff repair and subacromial decompression.  She is doing well.   X-ray images reviewed today.  No acute bony findings.  Recommend transitioning to over-the-counter anti-inflammatories as needed for pain.  She will continue sling use for the next 1 to 2 weeks.  May take breaks as needed.  Referral to physical therapy provided today.  She will go to [facility] in [place]. Protocol provided today.  Sutures removed today.  She will remain off work until cleared.    Follow Up:   6-8 weeks    Doreen Granados PA-C  [facility]    Dictated using Dragon Speech Recognition.

## 2024-12-17 NOTE — TELEPHONE ENCOUNTER
Caller: JOSETTE LOZANO/[facility]    Relationship: WORK COMP    Best call back number: [phone]    What form or medical record are you requesting: ALL PROG NOTES AND WORK STATUS    Who is requesting this form or medical record from you: WC    How would you like to receive the form or medical records (pick-up, mail, fax): FAX  If fax, what is the fax number: [phone]  ATTN:  JOSETTE

## 2025-01-30 ENCOUNTER — HOSPITAL ENCOUNTER (OUTPATIENT)
Dept: HOSPITAL 22 - OT | Age: 50
Discharge: HOME | End: 2025-01-30
Payer: COMMERCIAL

## 2025-01-30 DIAGNOSIS — M25.512: ICD-10-CM

## 2025-01-30 DIAGNOSIS — Z98.890: Primary | ICD-10-CM

## 2025-01-30 PROCEDURE — G0283 ELEC STIM OTHER THAN WOUND: HCPCS

## 2025-01-30 PROCEDURE — 97166 OT EVAL MOD COMPLEX 45 MIN: CPT

## 2025-01-30 PROCEDURE — 97014 ELECTRIC STIMULATION THERAPY: CPT

## 2025-01-30 PROCEDURE — 97110 THERAPEUTIC EXERCISES: CPT

## 2025-01-30 PROCEDURE — 97140 MANUAL THERAPY 1/> REGIONS: CPT

## 2025-02-18 ENCOUNTER — OFFICE VISIT (OUTPATIENT)
Age: 50
End: 2025-02-18
Payer: OTHER MISCELLANEOUS

## 2025-02-18 DIAGNOSIS — Z98.890 STATUS POST LEFT ROTATOR CUFF REPAIR: Primary | ICD-10-CM
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
## 2025-02-18 RX ORDER — DICLOFENAC SODIUM 75 MG/1
75 TABLET, DELAYED RELEASE ORAL DAILY
Qty: 30 TABLET | Refills: 2 | Status: SHIPPED | OUTPATIENT
Start: 2025-02-18

## 2025-02-18 NOTE — PROGRESS NOTES
[facility] Follow Up       Office Follow Up Visit       Patient Name: Mary Ambrosio    Chief Complaint:   Chief Complaint   Patient presents with    Post-op     8 week follow up; 10 week s/p left Arthroscopic rotator cuff repair 12/6/24       Referring Physician: Souleymane Guevara MD    History of Present Illness:   Mary Ambrosio returns to clinic today for postop visit 10.5 weeks s/p left arthroscopic rotator cuff repair, subacromial decompression with Dr. Serrano.  Last visit on 12/17/2024.  Referred to physical therapy at that time.  She has been going to [facility] outpatient PT.  Seeing improvements in left shoulder pain and range of motion.  She has been working hard both at PT and at home.  She has pulleys which have helped with her home exercises.    Work-related injury.  She has been off work.    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 with the parts and fits into an even bigger dez (parent dez); has to unlock the child dez use a large lever -- the large lever was stuck and about 1.5 hour, delivers every 10 minutes; lever that pulls and goes across with it  Date of Injury at Work: 8/23/2024  Mechanism of Injury at Work: as noted above  Current Work Status: LEFT SHOULDER -- not doing 2 of the jobs that involve lifting; lifting restriction  TREATMENTS: GUIDED PT for 4 weeks, helping some, treated with oral medications/NSAIDs  Diagnostic Tests: xrays 9/26/2024 left shoulder    Date of Surgery: 12/6/2024  Shoulder: LEFT shoulder   Preoperative Diagnosis:  1.     Rotator cuff tear, full thickness extension - treated with arthroscopic rotator cuff repair - CPT 72872  2.     Shoulder impingement syndrome, partial tearing CA ligament - treated with arthroscopic subacromial decompression with  acromioplasty - CPT 80621     Postoperative Diagnosis:  1.     SAME as preoperative diagnoses, LHB intact     Procedure:  1.     Arthroscopic rotator cuff repair (1x1) - CPT 15118  2.     Arthroscopic subacromial decompression with acromioplasty - CPT 99387    Subjective     Review of Systems   Musculoskeletal:  Positive for arthralgias.   All other systems reviewed and are negative.       I have reviewed and updated the following portions of the patient's history and review of systems: allergies, current medications, past family history, past medical history, past social history, past surgical history and problem list.    Medications:   Current Outpatient Medications:     diclofenac (VOLTAREN) 75 MG EC tablet, Take 1 tablet by mouth Daily., Disp: 30 tablet, Rfl: 2    metoprolol succinate XL (TOPROL-XL) 25 MG 24 hr tablet, , Disp: , Rfl:     rosuvastatin (CRESTOR) 5 MG tablet, , Disp: , Rfl:     venlafaxine XR (EFFEXOR-XR) 150 MG 24 hr capsule, Take 1 capsule by mouth Daily. PATIENT MUST CALL TO SCHEDULE A FOLLOW UP APPOINTMENT FOR FUTURE REFILLS [phone], Disp: 30 capsule, Rfl: 0    Zepbound 15 MG/0.5ML solution auto-injector, , Disp: , Rfl:     Allergies:   Allergies   Allergen Reactions    Taxotere [Docetaxel] Hives and Shortness Of Breath    Tegaderm Ag Mesh [Silver] Rash     Prolonged exposure          Objective      Vital Signs: There were no vitals filed for this visit.    Ortho Exam:  General: no acute distress, comfortable  Vitals reviewed in chart    Musculoskeletal Exam:    SIDE: Left shoulder  Incisions well healed    Range of motion measurements (degrees): Active range of motion improving to approximately 110/80 with full range passively  Negative lag sign  No evidence of septic joint      Results Review:  XR Shoulder 1 View Left  Left Shoulder X-Ray    Indication: Pain    Study:  Grashey AP, axillary lateral, and scapular Y views    Comparison: 9/26/24    Findings:  No acute fractures. No bony  lesions. Normal soft tissues. AC joint   demonstrates arthritic change unchanged from prior imaging.  The   glenohumeral joint is well-maintained.      Impression:    No acute bony abnormalities noted.        MRI Shoulder Left Without Contrast    Result Date: 11/5/2024  Impression: Full-thickness partial width tear of the far anterior distal supraspinatus tendon at the footprint with minimal retraction. Background moderate supraspinatus tendinopathy. No evidence of significant fatty muscle atrophy. Minimal glenohumeral and mild AC joint arthritis as above. Glenoid labrum appears intact. Electronically Signed: Ronnie Ko MD  11/5/2024 9:05 AM EST  Workstation ID: [id number]        Assessment / Plan      Assessment:   Diagnoses and all orders for this visit:    1. Status post left rotator cuff repair (Primary)  -     diclofenac (VOLTAREN) 75 MG EC tablet; Take 1 tablet by mouth Daily.  Dispense: 30 tablet; Refill: 2  -     Ambulatory Referral to Physical Therapy for Evaluation & Treatment    2. Work related injury  -     diclofenac (VOLTAREN) 75 MG EC tablet; Take 1 tablet by mouth Daily.  Dispense: 30 tablet; Refill: 2  -     Ambulatory Referral to Physical Therapy for Evaluation & Treatment        Quality Metrics:   BMI:   BMI is >= 25 and <30. (Overweight) The following options were offered after discussion;: weight loss educational material (shared in after visit summary)       Tobacco:   Mary Ambrosio  reports that she quit smoking about 21 years ago. Her smoking use included cigarettes. She started smoking about 31 years ago. She has a 10 pack-year smoking history. She has been exposed to tobacco smoke. She has never used smokeless tobacco.          Plan:  10.5 weeks s/p left arthroscopic rotator cuff repair and subacromial decompression.  She is making steady progress with physical therapy in terms of range of motion.  Recommend additional physical therapy for continued mobilization and gradual  strengthening starting at 12 weeks from surgery. Counseled regarding precautions with lifting.  Prescription sent for diclofenac to take daily for pain and inflammation. Avoid with other NSAIDs.  Remain off work until cleared. Anticipate light duty restrictions next visit.      Follow Up:   6-8 weeks    Doreen Granados PA-C  [facility]    Dictated using Dragon Speech Recognition.

## 2025-02-27 ENCOUNTER — HOSPITAL ENCOUNTER (OUTPATIENT)
Dept: HOSPITAL 22 - OT | Age: 50
Discharge: HOME | End: 2025-02-27
Payer: COMMERCIAL

## 2025-02-27 DIAGNOSIS — Z98.890: Primary | ICD-10-CM

## 2025-02-27 PROCEDURE — 97014 ELECTRIC STIMULATION THERAPY: CPT

## 2025-02-27 PROCEDURE — 97530 THERAPEUTIC ACTIVITIES: CPT

## 2025-02-27 PROCEDURE — 97110 THERAPEUTIC EXERCISES: CPT

## 2025-02-27 PROCEDURE — G0283 ELEC STIM OTHER THAN WOUND: HCPCS

## 2025-02-27 PROCEDURE — 97140 MANUAL THERAPY 1/> REGIONS: CPT

## 2025-02-27 PROCEDURE — 97168 OT RE-EVAL EST PLAN CARE: CPT

## 2025-03-27 ENCOUNTER — HOSPITAL ENCOUNTER (OUTPATIENT)
Dept: HOSPITAL 22 - OT | Age: 50
Discharge: HOME | End: 2025-03-27
Payer: COMMERCIAL

## 2025-03-27 DIAGNOSIS — Z98.890: Primary | ICD-10-CM

## 2025-03-27 PROCEDURE — 97168 OT RE-EVAL EST PLAN CARE: CPT

## 2025-03-27 PROCEDURE — 97014 ELECTRIC STIMULATION THERAPY: CPT

## 2025-03-27 PROCEDURE — 97140 MANUAL THERAPY 1/> REGIONS: CPT

## 2025-03-27 PROCEDURE — G0283 ELEC STIM OTHER THAN WOUND: HCPCS

## 2025-03-27 PROCEDURE — 97110 THERAPEUTIC EXERCISES: CPT

## 2025-04-01 ENCOUNTER — OFFICE VISIT (OUTPATIENT)
Age: 50
End: 2025-04-01
Payer: OTHER MISCELLANEOUS

## 2025-04-01 VITALS
DIASTOLIC BLOOD PRESSURE: 72 MMHG | BODY MASS INDEX: 27.29 KG/M2 | WEIGHT: 169.8 LBS | SYSTOLIC BLOOD PRESSURE: 110 MMHG | HEIGHT: 66 IN
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
## 2025-04-01 DIAGNOSIS — Z98.890 STATUS POST LEFT ROTATOR CUFF REPAIR: Primary | ICD-10-CM

## 2025-04-01 NOTE — PROGRESS NOTES
[facility] [place] [facility] Follow Up       Office Follow Up Visit       Patient Name: Mary Ambrosio    Chief Complaint:   Chief Complaint   Patient presents with    Post-op     6 week follow up;  4 month s/p left Arthroscopic rotator cuff repair 12/6/24       Referring Physician: Souleymane Guevara MD    History of Present Illness:   Mary Ambrosio returns to clinic today for 4-month follow-up visit s/p left arthroscopic rotator cuff repair with Dr. Serrano.  She reports improvements compared to last visit.  She has continued with physical therapy at [facility].  Left shoulder range of motion is much better.  Pain improving as well.  She is currently off work.     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 with the parts and fits into an even bigger dez (parent dez); has to unlock the child dez use a large lever -- the large lever was stuck and about 1.5 hour, delivers every 10 minutes; lever that pulls and goes across with it  Date of Injury at Work: 8/23/2024  Mechanism of Injury at Work: as noted above  Current Work Status: LEFT SHOULDER -- not doing 2 of the jobs that involve lifting; lifting restriction  TREATMENTS: GUIDED PT for 4 weeks, helping some, treated with oral medications/NSAIDs  Diagnostic Tests: xrays 9/26/2024 left shoulder     Date of Surgery: 12/6/2024  Shoulder: LEFT shoulder   Preoperative Diagnosis:  1.     Rotator cuff tear, full thickness extension - treated with arthroscopic rotator cuff repair - CPT 91375  2.     Shoulder impingement syndrome, partial tearing CA ligament - treated with arthroscopic subacromial decompression with acromioplasty - CPT 11274     Postoperative Diagnosis:  1.     SAME as preoperative diagnoses, LHB intact     Procedure:  1.     Arthroscopic rotator cuff repair (1x1) -  "CPT 81018  2.     Arthroscopic subacromial decompression with acromioplasty - CPT 75883    Subjective     Review of Systems     I have reviewed and updated the following portions of the patient's history and review of systems: allergies, current medications, past family history, past medical history, past social history, past surgical history and problem list.    Medications:   Current Outpatient Medications:     diclofenac (VOLTAREN) 75 MG EC tablet, Take 1 tablet by mouth Daily., Disp: 30 tablet, Rfl: 2    metoprolol succinate XL (TOPROL-XL) 25 MG 24 hr tablet, , Disp: , Rfl:     rosuvastatin (CRESTOR) 5 MG tablet, , Disp: , Rfl:     venlafaxine XR (EFFEXOR-XR) 150 MG 24 hr capsule, Take 1 capsule by mouth Daily. PATIENT MUST CALL TO SCHEDULE A FOLLOW UP APPOINTMENT FOR FUTURE REFILLS [phone], Disp: 30 capsule, Rfl: 0    Zepbound 15 MG/0.5ML solution auto-injector, , Disp: , Rfl:     Allergies:   Allergies   Allergen Reactions    Taxotere [Docetaxel] Hives and Shortness Of Breath    Tegaderm Ag Mesh [Silver] Rash     Prolonged exposure          Objective      Vital Signs:   Vitals:    04/01/25 1205   BP: 110/72   Weight: 77 kg (169 lb 12.8 oz)   Height: 167.5 cm (65.95\")       Ortho Exam:  General: no acute distress, comfortable  Vitals reviewed in chart    Musculoskeletal Exam:    SIDE: Left shoulder  Incisions well healed    Tenderness: No focal tenderness    Range of motion measurements (degrees): 140/120/60/L5  Painful arc of motion: No  Improved rotator cuff strength  No evidence of septic joint      Results Review:  XR Shoulder 1 View Left  Left Shoulder X-Ray    Indication: Pain    Study:  Grashey AP, axillary lateral, and scapular Y views    Comparison: 9/26/24    Findings:  No acute fractures. No bony lesions. Normal soft tissues. AC joint   demonstrates arthritic change unchanged from prior imaging.  The   glenohumeral joint is well-maintained.      Impression:    No acute bony abnormalities noted. "        I have noted results reviewed from previous encounter.        Assessment / Plan      Assessment:   Diagnoses and all orders for this visit:    1. Status post left rotator cuff repair (Primary)  -     Ambulatory Referral to Physical Therapy for Evaluation & Treatment    2. Work related injury  -     Ambulatory Referral to Physical Therapy for Evaluation & Treatment        Quality Metrics:   BMI:   BMI is >= 25 and <30. (Overweight) The following options were offered after discussion;: weight loss educational material (shared in after visit summary)       Tobacco:   Mary Ambrosio  reports that she quit smoking about 21 years ago. Her smoking use included cigarettes. She started smoking about 31 years ago. She has a 10 pack-year smoking history. She has been exposed to tobacco smoke. She has never used smokeless tobacco.           Plan:  4 months s/p left arthroscopic rotator cuff repair with Dr. Serrano.  Range of motion has greatly improved on exam today.  Recommend additional physical therapy for gradual strengthening.  New order placed today.  Light duty restrictions with work. No lifting greater than 10 pounds with left arm.  No pushing or pulling.  May take over-the-counter anti-inflammatories as needed for pain.      Follow Up:   6 weeks    Doreen Granados PA-C  [facility] [place] [place] [facility]    Dictated using Dragon Speech Recognition.

## 2025-04-29 ENCOUNTER — HOSPITAL ENCOUNTER (OUTPATIENT)
Dept: HOSPITAL 22 - OT | Age: 50
Discharge: HOME | End: 2025-04-29
Payer: COMMERCIAL

## 2025-04-29 DIAGNOSIS — Z98.890: Primary | ICD-10-CM

## 2025-04-29 PROCEDURE — 97110 THERAPEUTIC EXERCISES: CPT

## 2025-04-29 PROCEDURE — 97140 MANUAL THERAPY 1/> REGIONS: CPT

## 2025-04-29 PROCEDURE — 97168 OT RE-EVAL EST PLAN CARE: CPT

## 2025-04-29 PROCEDURE — G0283 ELEC STIM OTHER THAN WOUND: HCPCS

## 2025-04-29 PROCEDURE — 97010 HOT OR COLD PACKS THERAPY: CPT

## 2025-04-29 PROCEDURE — 97014 ELECTRIC STIMULATION THERAPY: CPT

## 2025-05-13 ENCOUNTER — OFFICE VISIT (OUTPATIENT)
Age: 50
End: 2025-05-13
Payer: OTHER MISCELLANEOUS

## 2025-05-13 VITALS
SYSTOLIC BLOOD PRESSURE: 90 MMHG | WEIGHT: 166.7 LBS | BODY MASS INDEX: 26.79 KG/M2 | HEIGHT: 66 IN | DIASTOLIC BLOOD PRESSURE: 64 MMHG
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
## 2025-05-13 DIAGNOSIS — Z98.890 STATUS POST LEFT ROTATOR CUFF REPAIR: ICD-10-CM

## 2025-05-13 RX ORDER — DICLOFENAC SODIUM 75 MG/1
75 TABLET, DELAYED RELEASE ORAL DAILY
Qty: 30 TABLET | Refills: 2 | Status: SHIPPED | OUTPATIENT
Start: 2025-05-13

## 2025-05-13 NOTE — PROGRESS NOTES
[facility] [place] [facility] Office Follow Up       Office Follow Up Visit       Patient Name: Mary Ambrosio    Chief Complaint:   Chief Complaint   Patient presents with    Follow-up     6 week follow up -- 5 month s/p left Arthroscopic rotator cuff repair 12/6/24       Referring Physician: No ref. provider found    History of Present Illness:   Mary Ambrosio returns to clinic today for follow-up visit 5 months s/p left arthroscopic rotator cuff repair and subacromial decompression with Dr. Serrano. She says left shoulder range of motion continues to get better with physical therapy. She has continued with PT at [facility] until last week when her visits ran out. Left shoulder range of motion is close to full motion. Still feels weakness. Working on strengthening. Some soreness with this. Takes diclofenac for pain.    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 with the parts and fits into an even bigger dez (parent dez); has to unlock the child dez use a large lever -- the large lever was stuck and about 1.5 hour, delivers every 10 minutes; lever that pulls and goes across with it  Date of Injury at Work: 8/23/2024  Mechanism of Injury at Work: as noted above  Current Work Status: LEFT SHOULDER -- not doing 2 of the jobs that involve lifting; lifting restriction  TREATMENTS: GUIDED PT for 4 weeks, helping some, treated with oral medications/NSAIDs  Diagnostic Tests: xrays 9/26/2024 left shoulder     Date of Surgery: 12/6/2024  Shoulder: LEFT shoulder   Preoperative Diagnosis:  1.     Rotator cuff tear, full thickness extension - treated with arthroscopic rotator cuff repair - CPT 11854  2.     Shoulder impingement syndrome, partial tearing CA ligament - treated with arthroscopic subacromial decompression with acromioplasty - CPT  76985     Postoperative Diagnosis:  1.     SAME as preoperative diagnoses, LHB intact     Procedure:  1.     Arthroscopic rotator cuff repair (1x1) - CPT 91093  2.     Arthroscopic subacromial decompression with acromioplasty - CPT 13867  Subjective     Review of Systems   Constitutional: Negative.  Negative for chills, fatigue and fever.   HENT: Negative.  Negative for congestion and dental problem.    Eyes: Negative.  Negative for blurred vision.   Respiratory: Negative.  Negative for shortness of breath.    Cardiovascular: Negative.  Negative for leg swelling.   Gastrointestinal: Negative.  Negative for abdominal pain.   Endocrine: Negative.  Negative for polyuria.   Genitourinary: Negative.  Negative for difficulty urinating.   Musculoskeletal:  Positive for arthralgias.   Skin: Negative.    Allergic/Immunologic: Negative.    Neurological: Negative.    Hematological: Negative.  Negative for adenopathy.   Psychiatric/Behavioral: Negative.  Negative for behavioral problems.         I have reviewed and updated the following portions of the patient's history and review of systems: allergies, current medications, past family history, past medical history, past social history, past surgical history and problem list.    Medications:   Current Outpatient Medications:     diclofenac (VOLTAREN) 75 MG EC tablet, Take 1 tablet by mouth Daily., Disp: 30 tablet, Rfl: 2    metoprolol succinate XL (TOPROL-XL) 25 MG 24 hr tablet, , Disp: , Rfl:     rosuvastatin (CRESTOR) 5 MG tablet, , Disp: , Rfl:     venlafaxine XR (EFFEXOR-XR) 150 MG 24 hr capsule, Take 1 capsule by mouth Daily. PATIENT MUST CALL TO SCHEDULE A FOLLOW UP APPOINTMENT FOR FUTURE REFILLS [phone], Disp: 30 capsule, Rfl: 0    Zepbound 15 MG/0.5ML solution auto-injector, , Disp: , Rfl:     Allergies:   Allergies   Allergen Reactions    Taxotere [Docetaxel] Hives and Shortness Of Breath    Tegaderm Ag Mesh [Silver] Rash     Prolonged exposure          Objective   "    Vital Signs:   Vitals:    05/13/25 1056   BP: 90/64   Weight: 75.6 kg (166 lb 11.2 oz)   Height: 167.5 cm (65.95\")       Ortho Exam:  General: no acute distress, comfortable  Vitals reviewed in chart    Musculoskeletal Exam:    SIDE: Left shoulder    Range of motion measurements (degrees): 140/140/60/L5  Rotator cuff strength: supraspinatus 4/5  Painful arc of motion: no  No evidence of septic joint      Results Review:  XR Shoulder 1 View Left  Left Shoulder X-Ray    Indication: Pain    Study:  Grashey AP, axillary lateral, and scapular Y views    Comparison: 9/26/24    Findings:  No acute fractures. No bony lesions. Normal soft tissues. AC joint   demonstrates arthritic change unchanged from prior imaging.  The   glenohumeral joint is well-maintained.      Impression:    No acute bony abnormalities noted.            Assessment / Plan      Assessment:   Diagnoses and all orders for this visit:    1. Status post left rotator cuff repair  -     diclofenac (VOLTAREN) 75 MG EC tablet; Take 1 tablet by mouth Daily.  Dispense: 30 tablet; Refill: 2  -     Ambulatory Referral to Physical Therapy for Evaluation & Treatment    2. Work related injury  -     diclofenac (VOLTAREN) 75 MG EC tablet; Take 1 tablet by mouth Daily.  Dispense: 30 tablet; Refill: 2  -     Ambulatory Referral to Physical Therapy for Evaluation & Treatment        Quality Metrics:   BMI:   BMI is >= 25 and <30. (Overweight) The following options were offered after discussion;: weight loss educational material (shared in after visit summary)       Tobacco:   Mary Ambrosio  reports that she quit smoking about 21 years ago. Her smoking use included cigarettes. She started smoking about 31 years ago. She has a 10 pack-year smoking history. She has been exposed to tobacco smoke. She has never used smokeless tobacco.     Plan:  Left shoulder range of motion and strength improving compared to last visit with physical therapy.  Recommend additional " physical therapy for strengthening.  She fatigues easily with strengthening before getting sore.  Recommend diclofenac once daily of for pain.  Work restrictions: no lifting more than 10 pounds with left upper extremity.      Follow Up:   6 weeks        Doreen Granados PA-C  [facility] [place] [facility]    Dictated using Dragon Speech Recognition.

## 2025-06-24 ENCOUNTER — OFFICE VISIT (OUTPATIENT)
Age: 50
End: 2025-06-24
Payer: OTHER MISCELLANEOUS

## 2025-06-24 VITALS
HEIGHT: 66 IN | DIASTOLIC BLOOD PRESSURE: 70 MMHG | SYSTOLIC BLOOD PRESSURE: 122 MMHG | BODY MASS INDEX: 27 KG/M2 | WEIGHT: 168 LBS
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
## 2025-06-24 DIAGNOSIS — Z98.890 STATUS POST LEFT ROTATOR CUFF REPAIR: Primary | ICD-10-CM

## 2025-06-24 PROCEDURE — 99213 OFFICE O/P EST LOW 20 MIN: CPT

## 2025-06-24 NOTE — PROGRESS NOTES
[facility] [place] [facility] Office Follow Up       Office Follow Up Visit       Patient Name: Mary Ambrosio    Chief Complaint:   Chief Complaint   Patient presents with    Follow-up     6 week follow up Left Arthroscopic rotator cuff repair 12/6/24       Referring Physician: Souleymane Guevara MD    History of Present Illness:   Mary Ambrosio returns to clinic today for follow-up visit almost 7 months s/p left shoulder arthroscopic rotator cuff repair and subacromial decompression with Dr. Serrano.  She reports continued improvements in left shoulder pain and function.  She has remained in physical therapy at [facility].  Working on left shoulder strengthening with but continues to feel weak with overhead activity. She is not yet back to working. Not able to accommodate light duty. She has been on 10 pound restriction.    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 with the parts and fits into an even bigger dez (parent dez); has to unlock the child dez use a large lever -- the large lever was stuck and about 1.5 hour, delivers every 10 minutes; lever that pulls and goes across with it  Date of Injury at Work: 8/23/2024  Mechanism of Injury at Work: as noted above  Current Work Status: LEFT SHOULDER -- not doing 2 of the jobs that involve lifting; lifting restriction  TREATMENTS: GUIDED PT for 4 weeks, helping some, treated with oral medications/NSAIDs  Diagnostic Tests: xrays 9/26/2024 left shoulder     Date of Surgery: 12/6/2024  Shoulder: LEFT shoulder   Preoperative Diagnosis:  1.     Rotator cuff tear, full thickness extension - treated with arthroscopic rotator cuff repair - CPT 44485  2.     Shoulder impingement syndrome, partial tearing CA ligament - treated with arthroscopic subacromial decompression with acromioplasty -  "CPT 08815     Postoperative Diagnosis:  1.     SAME as preoperative diagnoses, LHB intact     Procedure:  1.     Arthroscopic rotator cuff repair (1x1) - CPT 12928  2.     Arthroscopic subacromial decompression with acromioplasty - CPT 74176    Subjective     Review of Systems   Musculoskeletal:  Positive for arthralgias.   All other systems reviewed and are negative.       I have reviewed and updated the following portions of the patient's history and review of systems: allergies, current medications, past family history, past medical history, past social history, past surgical history and problem list.    Medications:   Current Outpatient Medications:     diclofenac (VOLTAREN) 75 MG EC tablet, Take 1 tablet by mouth Daily., Disp: 30 tablet, Rfl: 2    metoprolol succinate XL (TOPROL-XL) 25 MG 24 hr tablet, , Disp: , Rfl:     rosuvastatin (CRESTOR) 5 MG tablet, , Disp: , Rfl:     venlafaxine XR (EFFEXOR-XR) 150 MG 24 hr capsule, Take 1 capsule by mouth Daily. PATIENT MUST CALL TO SCHEDULE A FOLLOW UP APPOINTMENT FOR FUTURE REFILLS [phone], Disp: 30 capsule, Rfl: 0    Zepbound 15 MG/0.5ML solution auto-injector, , Disp: , Rfl:     Allergies:   Allergies   Allergen Reactions    Taxotere [Docetaxel] Hives and Shortness Of Breath    Tegaderm Ag Mesh [Silver] Rash     Prolonged exposure          Objective      Vital Signs:   Vitals:    06/24/25 1135   BP: 122/70   Weight: 76.2 kg (168 lb)   Height: 167.5 cm (65.95\")       Ortho Exam:  General: no acute distress, comfortable  Vitals reviewed in chart    Musculoskeletal Exam:    SIDE: Left shoulder    Tenderness: None    Range of motion measurements (degrees): 150/150/60/L5  Rotator cuff strength improved  Great deltoid strength  Painful arc of motion: no  No evidence of septic joint      Results Review:  XR Shoulder 1 View Left  Left Shoulder X-Ray    Indication: Pain    Study:  Grashey AP, axillary lateral, and scapular Y views    Comparison: " 9/26/24    Findings:  No acute fractures. No bony lesions. Normal soft tissues. AC joint   demonstrates arthritic change unchanged from prior imaging.  The   glenohumeral joint is well-maintained.      Impression:    No acute bony abnormalities noted.          Assessment / Plan      Assessment:   Diagnoses and all orders for this visit:    1. Status post left rotator cuff repair (Primary)  -     Ambulatory Referral to Physical Therapy for Evaluation & Treatment    2. Work related injury        Quality Metrics:   BMI:   BMI is >= 25 and <30. (Overweight) The following options were offered after discussion;: weight loss educational material (shared in after visit summary)       Tobacco:   Mary Ambrosio  reports that she quit smoking about 21 years ago. Her smoking use included cigarettes. She started smoking about 31 years ago. She has a 10 pack-year smoking history. She has been exposed to tobacco smoke. She has never used smokeless tobacco.        Plan:  Left shoulder pain and range of motion greatly improved.  Excellent range of motion on exam.  She will need additional strengthening prior to return to work.  Recommend 6 weeks of work hardening. Order placed today. Plan to follow-up at that time.  Targeting full duty return next visit.  Work: No lifting more than 15 pounds left upper extremity and no repetitive push/pull.      Follow Up:   6 weeks      Doreen Granados PA-C  [facility] [place] [facility]    Dictated using Dragon Speech Recognition.

## 2025-07-08 ENCOUNTER — TELEPHONE (OUTPATIENT)
Dept: ORTHOPEDIC SURGERY | Facility: CLINIC | Age: 50
End: 2025-07-08
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
## 2025-07-28 DIAGNOSIS — Z98.890 STATUS POST LEFT ROTATOR CUFF REPAIR: ICD-10-CM

## 2025-07-28 RX ORDER — DICLOFENAC SODIUM 75 MG/1
75 TABLET, DELAYED RELEASE ORAL DAILY
Qty: 30 TABLET | Refills: 0 | Status: SHIPPED | OUTPATIENT
Start: 2025-07-28

## 2025-08-05 ENCOUNTER — OFFICE VISIT (OUTPATIENT)
Age: 50
End: 2025-08-05
Payer: OTHER MISCELLANEOUS

## 2025-08-05 VITALS
SYSTOLIC BLOOD PRESSURE: 126 MMHG | WEIGHT: 168.4 LBS | DIASTOLIC BLOOD PRESSURE: 78 MMHG | HEIGHT: 66 IN | BODY MASS INDEX: 27.06 KG/M2
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
## 2025-08-05 DIAGNOSIS — Z98.890 STATUS POST LEFT ROTATOR CUFF REPAIR: Primary | ICD-10-CM

## (undated) DEVICE — ST NERV BLCK CONT CONTIPLEX ECHO CLSD 18G 4IN

## (undated) DEVICE — DRSNG WND BORDR/ADHS NONADHR/GZ LF 2X2IN STRL

## (undated) DEVICE — SKIN AFFIX SURG ADHESIVE 72/CS 0.55ML: Brand: MEDLINE

## (undated) DEVICE — APPL CHLORAPREP W/TINT 26ML BLU

## (undated) DEVICE — SUT MNCRYL PLS ANTIB UD 4/0 PS2 18IN

## (undated) DEVICE — DISPOSABLE BIPOLAR FORCEPS 7 3/4" (19.7CM) SCOVILLE BAYONET, INSULATED, 1.5MM TIP AND 12 FT. (3.6M) CABLE: Brand: KIRWAN

## (undated) DEVICE — ELECTRD BLD EDGE/INSUL1P 2.4X5.1MM STRL

## (undated) DEVICE — LEX GENERAL BREAST: Brand: MEDLINE INDUSTRIES, INC.

## (undated) DEVICE — STERILE PVP: Brand: MEDLINE INDUSTRIES, INC.

## (undated) DEVICE — SUT MNCRYL 5/0 P3 18IN UD MCP493G

## (undated) DEVICE — Device

## (undated) DEVICE — BNDR ABD PREMIUM/UNIV 10IN 27TO48IN

## (undated) DEVICE — 1010 S-DRAPE TOWEL DRAPE 10/BX: Brand: STERI-DRAPE™

## (undated) DEVICE — SYS FAT GRAFTING REVOLVE SGL

## (undated) DEVICE — SUT PDS 3/0 SH 27IN CLR PDP416H

## (undated) DEVICE — AIRWY 90MM NO9

## (undated) DEVICE — 3M™ STERI-STRIP™ REINFORCED ADHESIVE SKIN CLOSURES, R1547, 1/2 IN X 4 IN (12 MM X 100 MM), 6 STRIPS/ENVELOPE: Brand: 3M™ STERI-STRIP™

## (undated) DEVICE — MEDI-VAC NON-CONDUCTIVE SUCTION TUBING: Brand: CARDINAL HEALTH

## (undated) DEVICE — 3M™ BAIR HUGGER® UNDERBODY BLANKET, ADULT, 10 PER CASE 54500: Brand: BAIR HUGGER™

## (undated) DEVICE — COVER,LIGHT HANDLE,FLX,1/PK: Brand: MEDLINE INDUSTRIES, INC.

## (undated) DEVICE — ANTIBACTERIAL UNDYED BRAIDED (POLYGLACTIN 910), SYNTHETIC ABSORBABLE SUTURE: Brand: COATED VICRYL

## (undated) DEVICE — CANNULA,OXY,ADULT,SUPERSOFT,W/7'TUB,UC: Brand: MEDLINE

## (undated) DEVICE — ELECTRD BLD EXT EDGE/INSUL 1P 4IN

## (undated) DEVICE — BANDAGE,GAUZE,BULKEE II,2.25"X3YD,STRL: Brand: MEDLINE

## (undated) DEVICE — CVR HNDL LIGHT RIGID

## (undated) DEVICE — SYS SKIN CLS DERMABOND PRINEO W/22CM MESH TP

## (undated) DEVICE — PK CHST BRST 10

## (undated) DEVICE — BRA COMPR SURG STL958 XL

## (undated) DEVICE — SUT MONOCRYL PLS ANTIB UND 3/0  PS1 27IN

## (undated) DEVICE — SUT GUT PLN FAST ABS 6/0 PC1 18IN 1916G

## (undated) DEVICE — UNDERGLV SURG BIOGEL INDICATOR LF PF 7.5

## (undated) DEVICE — DISH PETRI 3.5IN MD STRL LF

## (undated) DEVICE — INTENDED USE FOR SURGICAL MARKING ON INTACT SKIN, ALSO PROVIDES A PERMANENT METHOD OF IDENTIFYING OBJECTS IN THE OPERATING ROOM: Brand: WRITESITE® REGULAR TIP SKIN MARKER

## (undated) DEVICE — SPNG GZ WOVN 4X4IN 12PLY 10/BX STRL

## (undated) DEVICE — GOWN,NON-REINFORCED,SIRUS,SET IN SLV,XL: Brand: MEDLINE

## (undated) DEVICE — PROXIMATE RH ROTATING HEAD SKIN STAPLERS (35 WIDE) CONTAINS 35 STAINLESS STEEL STAPLES: Brand: PROXIMATE

## (undated) DEVICE — GLV SURG SENSICARE W/ALOE PF LF 7 STRL

## (undated) DEVICE — IRRIGATOR BULB ASEPTO 60CC STRL

## (undated) DEVICE — MEDI-VAC YANKAUER SUCTION HANDLE W/BULBOUS TIP: Brand: CARDINAL HEALTH

## (undated) DEVICE — GLV SURG TRIUMPH ORTHO W/ALOE PF LTX 7.5 STRL

## (undated) DEVICE — SUT ETHLN 5/0 P3 18IN 698G

## (undated) DEVICE — GLV SURG SENSICARE MICRO PF LF 6.5 STRL

## (undated) DEVICE — TBG SXN LIPECTOMY 108IN

## (undated) DEVICE — SUT GUT PLN FAST ABS 5/0 PC1 18IN 1915G

## (undated) DEVICE — SPNG LAP PREWASH SFTPK 18X18IN 5PK STRL

## (undated) DEVICE — ADAPT ST INFUS ADMIN SYR 70IN

## (undated) DEVICE — BANDAGE,GAUZE,BULKEE II,4.5"X4.1YD,STRL: Brand: MEDLINE

## (undated) DEVICE — BOWL UTIL STRL 32OZ

## (undated) DEVICE — DRAIN JACKSON PRATT ROUND 15FR: Brand: CARDINAL HEALTH

## (undated) DEVICE — ST PRIM GRVTY NDLESS 3 INJ PORT 105IN

## (undated) DEVICE — FLTR HME STR UNIV W/SMPL PORT

## (undated) DEVICE — DECANT BG O JET

## (undated) DEVICE — DEV DEL BRST/IMP GEL KELLER2 FUNNEL

## (undated) DEVICE — GLV SURG SENSICARE W/ALOE PF LF SZ6 STRL

## (undated) DEVICE — SUT SILK 2/0 PS 18IN 1588H

## (undated) DEVICE — SUT MNCRYL PLS ANTIB UD 3/0 PS2 27IN

## (undated) DEVICE — COVADERM: Brand: DEROYAL

## (undated) DEVICE — LINER CANSTR SXN HERCULES

## (undated) DEVICE — CANNULA,ADULT,SOFT-TOUCH,7TUBE,SC: Brand: MEDLINE

## (undated) DEVICE — BRA COMPR SURG STL958 QUEEN

## (undated) DEVICE — SYR LL 3CC

## (undated) DEVICE — GLV SURG SENSICARE MICRO PF LF 7 STRL

## (undated) DEVICE — GLV SURG BIOGEL LTX PF 6

## (undated) DEVICE — SUT SILK 2/0 TIES 18IN A185H

## (undated) DEVICE — JACKSON-PRATT 100CC BULB RESERVOIR: Brand: CARDINAL HEALTH

## (undated) DEVICE — PENCL E/S HNDSWCH ROCKRBTN HOLSTR 10FT

## (undated) DEVICE — DRSNG WND BORDR/ADHS NONADHR/GZ LF 4X14IN STRL

## (undated) DEVICE — NDL HYPO ECLPS SFTY 30G 1/2IN

## (undated) DEVICE — CAMERA/LASER ARM DRAPE: Brand: DEROYAL

## (undated) DEVICE — ST INF 2NDARY 20DRP VNT/NOVNT 30IN

## (undated) DEVICE — SUT SILK 3/0 TIES 18IN A184H

## (undated) DEVICE — DRAPE,TOP,102X53,STERILE: Brand: MEDLINE

## (undated) DEVICE — HDRST POSTIN FM CRDL TRACH SLOT 9X8X4IN

## (undated) DEVICE — CAUTERY TIP POLISHER: Brand: DEVON

## (undated) DEVICE — SYR LUERLOK 20CC BX/50

## (undated) DEVICE — SUT ETHLN 3/0 PC5 18IN 1893G